# Patient Record
Sex: FEMALE | Race: WHITE | NOT HISPANIC OR LATINO | Employment: OTHER | URBAN - METROPOLITAN AREA
[De-identification: names, ages, dates, MRNs, and addresses within clinical notes are randomized per-mention and may not be internally consistent; named-entity substitution may affect disease eponyms.]

---

## 2017-01-23 ENCOUNTER — ALLSCRIPTS OFFICE VISIT (OUTPATIENT)
Dept: OTHER | Facility: OTHER | Age: 57
End: 2017-01-23

## 2017-01-23 DIAGNOSIS — N63.0 BREAST LUMP: ICD-10-CM

## 2017-01-26 ENCOUNTER — HOSPITAL ENCOUNTER (OUTPATIENT)
Dept: ULTRASOUND IMAGING | Facility: CLINIC | Age: 57
Discharge: HOME | End: 2017-01-26
Payer: COMMERCIAL

## 2017-01-26 ENCOUNTER — HOSPITAL ENCOUNTER (OUTPATIENT)
Dept: MAMMOGRAPHY | Facility: CLINIC | Age: 57
Discharge: HOME | End: 2017-01-26
Payer: COMMERCIAL

## 2017-01-26 DIAGNOSIS — N63.0 BREAST LUMP: ICD-10-CM

## 2017-01-26 PROCEDURE — G0206 DX MAMMO INCL CAD UNI: HCPCS

## 2017-01-26 PROCEDURE — 76642 ULTRASOUND BREAST LIMITED: CPT

## 2017-02-08 ENCOUNTER — ALLSCRIPTS OFFICE VISIT (OUTPATIENT)
Dept: OTHER | Facility: OTHER | Age: 57
End: 2017-02-08

## 2017-03-09 ENCOUNTER — TRANSCRIBE ORDERS (OUTPATIENT)
Dept: ADMINISTRATIVE | Facility: HOSPITAL | Age: 57
End: 2017-03-09

## 2017-03-09 DIAGNOSIS — Z12.31 OTHER SCREENING MAMMOGRAM: Primary | ICD-10-CM

## 2017-04-17 ENCOUNTER — GENERIC CONVERSION - ENCOUNTER (OUTPATIENT)
Dept: OTHER | Facility: OTHER | Age: 57
End: 2017-04-17

## 2017-05-11 ENCOUNTER — ALLSCRIPTS OFFICE VISIT (OUTPATIENT)
Dept: OTHER | Facility: OTHER | Age: 57
End: 2017-05-11

## 2017-05-11 DIAGNOSIS — Z79.899 OTHER LONG TERM (CURRENT) DRUG THERAPY: ICD-10-CM

## 2017-05-15 DIAGNOSIS — Z12.31 ENCOUNTER FOR SCREENING MAMMOGRAM FOR MALIGNANT NEOPLASM OF BREAST: ICD-10-CM

## 2017-05-30 ENCOUNTER — ALLSCRIPTS OFFICE VISIT (OUTPATIENT)
Dept: OTHER | Facility: OTHER | Age: 57
End: 2017-05-30

## 2017-06-09 ENCOUNTER — APPOINTMENT (OUTPATIENT)
Dept: LAB | Facility: CLINIC | Age: 57
End: 2017-06-09
Payer: COMMERCIAL

## 2017-06-09 DIAGNOSIS — Z79.899 OTHER LONG TERM (CURRENT) DRUG THERAPY: ICD-10-CM

## 2017-06-09 LAB
ALBUMIN SERPL BCP-MCNC: 3.7 G/DL (ref 3.5–5)
ALP SERPL-CCNC: 55 U/L (ref 46–116)
ALT SERPL W P-5'-P-CCNC: 31 U/L (ref 12–78)
ANION GAP SERPL CALCULATED.3IONS-SCNC: 6 MMOL/L (ref 4–13)
AST SERPL W P-5'-P-CCNC: 27 U/L (ref 5–45)
BASOPHILS # BLD AUTO: 0.06 THOUSANDS/ΜL (ref 0–0.1)
BASOPHILS NFR BLD AUTO: 1 % (ref 0–1)
BILIRUB SERPL-MCNC: 0.5 MG/DL (ref 0.2–1)
BILIRUB UR QL STRIP: NEGATIVE
BUN SERPL-MCNC: 18 MG/DL (ref 5–25)
CALCIUM SERPL-MCNC: 8.9 MG/DL (ref 8.3–10.1)
CHLORIDE SERPL-SCNC: 104 MMOL/L (ref 100–108)
CLARITY UR: CLEAR
CO2 SERPL-SCNC: 30 MMOL/L (ref 21–32)
COLOR UR: YELLOW
CREAT SERPL-MCNC: 0.92 MG/DL (ref 0.6–1.3)
EOSINOPHIL # BLD AUTO: 0.28 THOUSAND/ΜL (ref 0–0.61)
EOSINOPHIL NFR BLD AUTO: 4 % (ref 0–6)
ERYTHROCYTE [DISTWIDTH] IN BLOOD BY AUTOMATED COUNT: 14.3 % (ref 11.6–15.1)
GFR SERPL CREATININE-BSD FRML MDRD: >60 ML/MIN/1.73SQ M
GLUCOSE P FAST SERPL-MCNC: 110 MG/DL (ref 65–99)
GLUCOSE UR STRIP-MCNC: NEGATIVE MG/DL
HCT VFR BLD AUTO: 40.4 % (ref 34.8–46.1)
HGB BLD-MCNC: 13.7 G/DL (ref 11.5–15.4)
HGB UR QL STRIP.AUTO: NEGATIVE
KETONES UR STRIP-MCNC: NEGATIVE MG/DL
LEUKOCYTE ESTERASE UR QL STRIP: NEGATIVE
LYMPHOCYTES # BLD AUTO: 3.12 THOUSANDS/ΜL (ref 0.6–4.47)
LYMPHOCYTES NFR BLD AUTO: 43 % (ref 14–44)
MCH RBC QN AUTO: 30.8 PG (ref 26.8–34.3)
MCHC RBC AUTO-ENTMCNC: 33.9 G/DL (ref 31.4–37.4)
MCV RBC AUTO: 91 FL (ref 82–98)
MONOCYTES # BLD AUTO: 0.63 THOUSAND/ΜL (ref 0.17–1.22)
MONOCYTES NFR BLD AUTO: 9 % (ref 4–12)
NEUTROPHILS # BLD AUTO: 3.19 THOUSANDS/ΜL (ref 1.85–7.62)
NEUTS SEG NFR BLD AUTO: 43 % (ref 43–75)
NITRITE UR QL STRIP: NEGATIVE
NRBC BLD AUTO-RTO: 0 /100 WBCS
PH UR STRIP.AUTO: 6.5 [PH] (ref 4.5–8)
PLATELET # BLD AUTO: 299 THOUSANDS/UL (ref 149–390)
PMV BLD AUTO: 10.9 FL (ref 8.9–12.7)
POTASSIUM SERPL-SCNC: 3.9 MMOL/L (ref 3.5–5.3)
PROT SERPL-MCNC: 7.3 G/DL (ref 6.4–8.2)
PROT UR STRIP-MCNC: NEGATIVE MG/DL
RBC # BLD AUTO: 4.45 MILLION/UL (ref 3.81–5.12)
SODIUM SERPL-SCNC: 140 MMOL/L (ref 136–145)
SP GR UR STRIP.AUTO: 1.02 (ref 1–1.03)
TSH SERPL DL<=0.05 MIU/L-ACNC: 1.31 UIU/ML (ref 0.36–3.74)
UROBILINOGEN UR QL STRIP.AUTO: 0.2 E.U./DL
WBC # BLD AUTO: 7.29 THOUSAND/UL (ref 4.31–10.16)

## 2017-06-09 PROCEDURE — 84443 ASSAY THYROID STIM HORMONE: CPT

## 2017-06-09 PROCEDURE — 80053 COMPREHEN METABOLIC PANEL: CPT

## 2017-06-09 PROCEDURE — 81003 URINALYSIS AUTO W/O SCOPE: CPT

## 2017-06-09 PROCEDURE — 85025 COMPLETE CBC W/AUTO DIFF WBC: CPT

## 2017-06-09 PROCEDURE — 36415 COLL VENOUS BLD VENIPUNCTURE: CPT

## 2017-06-12 ENCOUNTER — GENERIC CONVERSION - ENCOUNTER (OUTPATIENT)
Dept: OTHER | Facility: OTHER | Age: 57
End: 2017-06-12

## 2017-07-06 ENCOUNTER — HOSPITAL ENCOUNTER (OUTPATIENT)
Dept: BONE DENSITY | Facility: IMAGING CENTER | Age: 57
Discharge: HOME | End: 2017-07-06
Payer: COMMERCIAL

## 2017-07-06 DIAGNOSIS — Z12.31 ENCOUNTER FOR SCREENING MAMMOGRAM FOR MALIGNANT NEOPLASM OF BREAST: ICD-10-CM

## 2017-07-06 PROCEDURE — G0202 SCR MAMMO BI INCL CAD: HCPCS

## 2017-09-08 ENCOUNTER — LAB CONVERSION - ENCOUNTER (OUTPATIENT)
Dept: OTHER | Facility: OTHER | Age: 57
End: 2017-09-08

## 2017-09-08 LAB
CHOLEST SERPL-MCNC: 260 MG/DL
CHOLEST/HDLC SERPL: 3 (CALC)
HBA1C MFR BLD HPLC: 5 % OF TOTAL HGB
HDLC SERPL-MCNC: 88 MG/DL
LDL CHOLESTEROL (HISTORICAL): 154 MG/DL (CALC)
NON-HDL-CHOL (CHOL-HDL) (HISTORICAL): 172 MG/DL (CALC)
TRIGL SERPL-MCNC: 78 MG/DL

## 2017-09-14 ENCOUNTER — ALLSCRIPTS OFFICE VISIT (OUTPATIENT)
Dept: OTHER | Facility: OTHER | Age: 57
End: 2017-09-14

## 2017-10-26 NOTE — PROGRESS NOTES
Assessment  1  Abnormal fasting glucose (790 29) (R73 01)   2  Hyperlipidemia (272 4) (E78 5)    Plan  PMH: History of influenza vaccination    · Fluzone Quadrivalent Intramuscular Suspension    #1 patient's cholesterol numbers are about the same or a little higher  I'm not to worry about them since her [place] risk factor for heart disease is so low  She will eat well and primarily will start exercising at least 20-30 minutes a day  Our goal is to lose about 9 pounds before she comes back in 6 months    #2 has elevated fasting sugar but her A1c does not show any signs of diabetes at all    Recheck for weight check in 6 months or sooner if needed    Next colonoscopy is July 2020  Next mammogram May 13, 2017 or later; she has an appointment for Maggie  Next GYN test is due December 2018 and follows with GYN      Chief Complaint  Pt presents here to review blood work;  due 07/2020done 01/2013due 07/06 2018due 12/2018      History of Present Illness  Patient is here to go over her cholesterol and A1c for an elevated fasting blood sugar  Patient started out doing well watching her diet until her father-in-law went to hospice and she is now sharing this duty of watching him and not leaving him alone with her brother and sister-in-law  she had been feeling well never really had a problem  is walking more especially since she started school again      Review of Systems  Constitutional  No fever chills no fatigue no weight loss no weight gain    Mental status  No anxiety or depression and no sleep disturbances no changes in personality or emotional problems no suicidal or homicidal ideations    Eyes  No eye pain no red eyes no visual disturbances no discharge no eye itch    ENT  No earache no hearing loss nasal discharge no sore throat no hoarseness no postnasal drip     Cardio  No chest pain no palpitations no leg edema no claudication no dyspnea on exertion no nocturnal dyspnea    Respiratory  No shortness of breath or wheeze no cough no orthopnea no dyspnea on exertion no hemoptysis no sputum production    GI  No abdominal pain no nausea no vomiting no diarrhea or constipation no bloody stools no change in bowel habits no change in weight      no dysuria hematuria no pyuria no incontinence no pelvic pain    Musculoskeletal  No myalgias arthralgias no joint swelling or stiffness no limb pain or swelling    Skin  No rashes or lesions no itchiness and no wounds    Neuro  No headache dizziness lightheadedness syncope numbness paresthesias or confusion    Heme  No swollen glands no easy bruising        Active Problems  1  Abnormal fasting glucose (790 29) (R73 01)   2  Allergic rhinitis (477 9) (J30 9)   3  Hyperlipidemia (272 4) (E78 5)   4  Long term use of drug (V58 69) (Z79 899)   5  Postmenopause atrophic vaginitis (627 3) (N95 2)    Past Medical History  1  History of Anemia (285 9) (D64 9)   2  History of Chest tightness or pressure (786 59) (R07 89)   3  History of DVT (deep vein thrombosis) in pregnancy (671 30) (O22 30,I82 409)   4  History of  2 (V22 2) (Z33 1)   5  History of actinic keratosis (V13 3) (Z87 2)   6  History of breast lump (V13 89) (Z87 898)   7  History of vaginitis (V13 29) (Z87 42)   8  History of Shoulder pain, left (719 41) (M25 512)    Surgical History  1  History of Biopsy Breast Percutaneous Needle Core   2  History of  Section   3  History of Complete Colonoscopy   4  History of Tonsillectomy    Family History  Mother    1  Family history of Anxiety   2  Family history of Breast cancer   3  Family history of    4  Family history of pancreatic cancer (V16 0) (Z80 0)  Father    5  Family history of    6  Family history of Hypertension   7  Family history of Prostate cancer  Daughter    6  Family history of Depression  Maternal Grandmother    9  Family history of Breast cancer  Paternal Grandmother    8  Family history of Breast cancer   11   Family history of Stroke  Maternal Grandfather    12  Family history of CAD (coronary artery disease)  Maternal Aunt    13  Family history of malignant neoplasm of breast (V16 3) (Z80 3)  Maternal Cousin    15  Family history of malignant neoplasm of breast (V16 3) (Z80 3)    Social History   · Activities: Treadmill   · Always uses seat belt   · Caffeine use (V49 89) (F15 90)   · Currently sexually active   · Drinks wine (V49 89) (Z78 9)   · Exercise: Running   · Exercise: Walking   · Exercises moderately less than 3 times a week   · Has smoke detectors   · Never a smoker   · Denied: History of No drug use   · Weight training    Current Meds   1  Aspirin 81 MG Oral Tablet Chewable; Take 1 tablet daily; Therapy: (Recorded:[id number]) to Recorded   2  Pedro/Mag Citrate TABS; Therapy: (Recorded:16Jun2015) to Recorded   3  Glucosamine Chondroitin TABS; take 2 tablet daily; Therapy: (Recorded:16Jun2015) to Recorded   4  Iron TABS; TAKE 1 TABLET DAILY AS DIRECTED; Therapy: (Recorded:02Dec2014) to Recorded   5  Fleetwood-3 Fish Oil CAPS; take 1 capsule daily; Therapy: (Recorded:02Dec2014) to Recorded   6  Womens One Daily TABS; TAKE 1 TABLET DAILY; Therapy: (Recorded:23Jan2017) to Recorded    Allergies  1  No Known Drug Allergies  2  Dust   3  Mold   4  Trees   5  No Known Food Allergies    Vitals  Vital Signs    Recorded: 14Sep2017 03:16PM   Heart Rate 60   Respiration 14   Systolic 958, RUE, Sitting   Diastolic 60, RUE, Sitting   Height 5 ft 7 in   Weight 178 lb 12 8 oz   BMI Calculated 28   BSA Calculated 1 93     Physical Exam  Mental status-  Awake alert oriented conversant cooperative relevant     Gen  No acute distress appears age        Results/Data  PHQ-2 Adult Depression Screening 14Sep2017 03:19PM User, [employer]     Test Name Result Flag Reference   PHQ-2 Adult Depression Score 0     Over the last two weeks, how often have you been bothered by any of the following problems?   Little interest or pleasure in doing things: Not at all - 0  Feeling down, depressed, or hopeless: Not at all - 0   PHQ-2 Adult Depression Screening Negative       [place] Risk of Heart Attack [id number] 03:39PM Thomas Stevens     Test Name Result Flag Reference   [place] - Comparative 12 %     Sex: Female  Age: 61  Total Cholesterol: 260 mg/dL  HDL Cholesterol: 88 mg/dL  Systolic Blood Pressure: 823 mmHg  Diastolic Blood Pressure: 60 mmHg  Diabetes: No  Smoking Status: No   [place] - Ten Year 3 %     Sex: Female  Age: 61  Total Cholesterol: 260 mg/dL  HDL Cholesterol: 88 mg/dL  Systolic Blood Pressure: 342 mmHg  Diastolic Blood Pressure: 60 mmHg  Diabetes: No  Smoking Status: No     (1) LIPID PANEL, FASTING [id number] 07:01AM Thomas Stevens     Test Name Result Flag Reference   CHOLESTEROL, TOTAL 260 mg/dL H <200   HDL CHOLESTEROL 88 mg/dL  >32   TRIGLICERIDES 78 mg/dL  <256   LDL-CHOLESTEROL 154 mg/dL (calc) H    Reference range: <100     Desirable range <100 mg/dL for patients with CHD or  diabetes and <70 mg/dL for diabetic patients with  known heart disease  The Festus-Gonzalez calculation is a validated novel   method that provides better accuracy than the   Friedewald equation in the estimation of LDL-C,   particularly when TG levels are 150-400 mg/dL and   LDL-C levels are lower than 70 mg/dL  Reference:  Natividad Alanis et al  Comparison of a Novel   Method vs the Friedewald Equation for Estimating   Low-Density Lipoprotein Cholesterol Levels From the   Standard Lipid Profile  ALEX  3298;823(32): 5220-4163  For additional information, please refer to  http://In[employer] API/faq/TQW488  (This link is being provided for informational/  educational purposes only )   CHOL/HDLC RATIO 3 0 (calc)  <5 0   NON HDL CHOLESTEROL 172 mg/dL (calc) H <130   For patients with diabetes plus 1 major ASCVD risk   factor, treating to a non-HDL-C goal of <100 mg/dL   (LDL-C of <70 mg/dL) is considered a therapeutic   option       (Q) HEMOGLOBIN A1c [id number] 07:01AM Phoebe Salazar   REPORT COMMENT:  FASTING:YES     Test Name Result Flag Reference   HEMOGLOBIN A1c 5 0 % of total Hgb  <5 7   For the purpose of screening for the presence of  diabetes:     <5 7%       Consistent with the absence of diabetes  5 7-6 4%    Consistent with increased risk for diabetes              (prediabetes)  > or =6 5%  Consistent with diabetes     This assay result is consistent with a decreased risk  of diabetes  Currently, no consensus exists regarding use of  hemoglobin A1c for diagnosis of diabetes in children  According to American Diabetes Association (ADA)  guidelines, hemoglobin A1c <7 0% represents optimal  control in non-pregnant diabetic patients  Different  metrics may apply to specific patient populations  Standards of Medical Care in Diabetes(ADA)  (1) LIPID PANEL, FASTING [id number] 08:39AM Phoebe Salazar     Test Name Result Flag Reference   CHOLESTEROL, TOTAL 234 mg/dL H 125-200   HDL CHOLESTEROL 74 mg/dL  > OR = 46   TRIGLICERIDES 73 mg/dL  <294   LDL-CHOLESTEROL 145 mg/dL (calc) H <130   Desirable range <100 mg/dL for patients with CHD or  diabetes and <70 mg/dL for diabetic patients with  known heart disease  CHOL/HDLC RATIO 3 2 (calc)  < OR = 5 0   NON HDL CHOLESTEROL 160 mg/dL (calc) H    Target for non-HDL cholesterol is 30 mg/dL higher than   LDL cholesterol target       Future Appointments    Date/Time Provider Specialty Site   03/19/2018 02:15 PM Phoebe Salazar DO Family Medicine [facility]   02/15/2018 04:00 PM Ila Sandoval, [facility] Obstetrics/Gynecology [facility]     Signatures   Electronically signed by : Steve Cason DO; Sep 14 2017  4:26PM EST                       (Author)

## 2018-01-13 VITALS
WEIGHT: 182 LBS | BODY MASS INDEX: 28.56 KG/M2 | DIASTOLIC BLOOD PRESSURE: 60 MMHG | HEIGHT: 67 IN | SYSTOLIC BLOOD PRESSURE: 102 MMHG

## 2018-01-13 VITALS
BODY MASS INDEX: 29 KG/M2 | SYSTOLIC BLOOD PRESSURE: 120 MMHG | RESPIRATION RATE: 14 BRPM | WEIGHT: 184.8 LBS | HEART RATE: 60 BPM | HEIGHT: 67 IN | DIASTOLIC BLOOD PRESSURE: 70 MMHG

## 2018-01-14 VITALS
HEIGHT: 67 IN | HEART RATE: 60 BPM | SYSTOLIC BLOOD PRESSURE: 114 MMHG | DIASTOLIC BLOOD PRESSURE: 60 MMHG | BODY MASS INDEX: 28.16 KG/M2 | WEIGHT: 179.4 LBS | RESPIRATION RATE: 16 BRPM

## 2018-01-14 VITALS
SYSTOLIC BLOOD PRESSURE: 110 MMHG | HEART RATE: 60 BPM | WEIGHT: 178.8 LBS | BODY MASS INDEX: 28.06 KG/M2 | HEIGHT: 67 IN | DIASTOLIC BLOOD PRESSURE: 60 MMHG | RESPIRATION RATE: 14 BRPM

## 2018-01-15 NOTE — MISCELLANEOUS
Message   Recorded as Task   Date: 04/14/2017 01:31 PM, Created By: Camille Leija   Task Name: Call Back   Assigned To: Heide Burgess   Regarding Patient: Avelino Luke, Status: Active   CommentErchloe Peres - 14 Apr 2017 1:31 PM     TASK CREATED  Caller: Self; [phone] (Home)  pt scheduled her yearly recheck for may  does she need labs and could she also have an order to be tested for a predisposition for pancreatic cancer since she does have a strong family history of pancreatic cancer deaths  pt # [phone]   [facility],DQAVW - 16 Apr 2017 8:20 PM     TASK REPLIED TO: Previously Assigned To Kiana Miguel  #1 there is no lab testing for pancreatic cancer other than possibly seeing a   Digna Rivas to what they may have to offer    #2 she is not on any medications so I really have no blood work I need to draw since everything she had done last year was totally normal     She has symptoms that I need to investigate I have no justification for blood work at this time  Camille Leija - 17 Apr 2017 10:38 AM     TASK EDITED  Bennett Taylor - 17 Apr 2017 10:44 AM     TASK EDITED  pt informed   Camille Leija - 17 Apr 2017 10:44 AM     TASK REASSIGNED: Previously Assigned To [employer]   Electronically signed by : Wes Brock DO;  Apr 17 2017  5:25PM EST                       (Author)

## 2018-01-16 NOTE — MISCELLANEOUS
Message   Recorded as Task   Date: 06/12/2017 08:28 AM, Created By: Savage Krause   Task Name: Call Back   Assigned To: Savage Krause   Regarding Patient: Jeffery Mcpherson, Status: Active   CommentTarri Ian - 12 Jun 2017 8:28 AM     TASK CREATED  Call patient please    Received her blood work and her sugar was elevated as well as her cholesterol  Regarding sugar please cut down on sweets bread and pasta  Regarding her cholesterol cut down on cheese ice cream and fatty cuts of meat  Please make an appointment for 3 months and have fasting blood work done with an A1c and lipids  Orders are on the [address]r Drive - 12 Jun 2017 11:09 AM     TASK REPLIED TO: Previously Assigned To Katlyn Figueroa  SPOKE TO PATIENT, SHE BOOKED FOR SEPT        Signatures   Electronically signed by : Dany Nissen, DO; Jun 12 2017  1:10PM EST                       (Author)

## 2018-02-15 ENCOUNTER — OFFICE VISIT (OUTPATIENT)
Dept: OBGYN CLINIC | Facility: CLINIC | Age: 58
End: 2018-02-15
Payer: COMMERCIAL

## 2018-02-15 VITALS
BODY MASS INDEX: 27.47 KG/M2 | SYSTOLIC BLOOD PRESSURE: 108 MMHG | HEIGHT: 67 IN | WEIGHT: 175 LBS | DIASTOLIC BLOOD PRESSURE: 64 MMHG

## 2018-02-15 DIAGNOSIS — Z11.51 SCREENING FOR HPV (HUMAN PAPILLOMAVIRUS): ICD-10-CM

## 2018-02-15 DIAGNOSIS — Z01.419 ENCOUNTER FOR GYNECOLOGICAL EXAMINATION (GENERAL) (ROUTINE) WITHOUT ABNORMAL FINDINGS: Primary | ICD-10-CM

## 2018-02-15 DIAGNOSIS — Z12.31 VISIT FOR SCREENING MAMMOGRAM: ICD-10-CM

## 2018-02-15 PROBLEM — E78.5 HYPERLIPIDEMIA: Status: ACTIVE | Noted: 2017-06-12

## 2018-02-15 PROCEDURE — 87624 HPV HI-RISK TYP POOLED RSLT: CPT | Performed by: PHYSICIAN ASSISTANT

## 2018-02-15 PROCEDURE — S0612 ANNUAL GYNECOLOGICAL EXAMINA: HCPCS | Performed by: PHYSICIAN ASSISTANT

## 2018-02-15 PROCEDURE — G0145 SCR C/V CYTO,THINLAYER,RESCR: HCPCS | Performed by: PHYSICIAN ASSISTANT

## 2018-02-15 RX ORDER — MULTIVIT WITH CALCIUM,IRON,MIN 27MG-0.4MG
1 TABLET ORAL DAILY
COMMUNITY

## 2018-02-15 RX ORDER — ASPIRIN 81 MG/1
1 TABLET, CHEWABLE ORAL DAILY
COMMUNITY

## 2018-02-15 RX ORDER — IRON,CARBONYL/ASCORBIC ACID 100-250 MG
1 TABLET ORAL DAILY
COMMUNITY

## 2018-02-15 RX ORDER — CHLORAL HYDRATE 500 MG
1 CAPSULE ORAL DAILY
COMMUNITY

## 2018-02-15 NOTE — ASSESSMENT & PLAN NOTE
Annual exam and pap performed, will call if abnormal or send letter if normal   Mammo rx given to patient  RTO 1 year

## 2018-02-15 NOTE — PROGRESS NOTES
Assessment/Plan  Problem List Items Addressed This Visit     Encounter for gynecological examination (general) (routine) without abnormal findings - Primary     Annual exam and pap performed, will call if abnormal or send letter if normal   Mammo rx given to patient  RTO 1 year  Relevant Orders    Liquid-based pap, screening      Other Visit Diagnoses     Screening for HPV (human papillomavirus)        Relevant Orders    Liquid-based pap, screening    Visit for screening mammogram        Relevant Orders    Mammo screening bilateral w cad        Subjective   Mike Fields is a 61 y o  female who presents for annual GYN exam  She reports changes in medical hx- dx with hyperlipidemia attempting diet modification before medication  She denies any changes in surgical or family histories  Patient had BRCA testing - negative  No menses, and she denies postmenopausal bleeding, spotting, or discharge  She reports that she is sexually active with 1 partner  She reports some pain or dryness with intercourse using coconut oil with good relief    Last Pap smear: 2014  Regular self breast exam: yes  Last mammogram: 2017  Last DEXA scan: 2013  Last Colonoscopy:  Family history of uterine or ovarian cancer: no  Family history of breast cancer: yes - mother, maternal aunt, maternal distant cousin  Family history of colon cancer: no    Menstrual History:  OB History      Para Term  AB Living    2              SAB TAB Ectopic Multiple Live Births                     Obstetric Comments    DVT in  Pregnancy          No LMP recorded (lmp unknown)   Patient is postmenopausal   Dysmenorrhea: None    The following portions of the patient's history were reviewed and updated as appropriate: allergies, current medications, past family history, past medical history, past social history, past surgical history and problem list     Review of Systems  Review of Systems   Constitutional: Negative for chills and fever  Respiratory: Negative for shortness of breath  Cardiovascular: Negative for chest pain  Gastrointestinal: Negative for abdominal pain  Genitourinary: Negative for dysuria, pelvic pain, vaginal discharge and vaginal pain  Negative for breast pain or lumps  (+) for mild stress urinary incontinence  Neurological: Negative for headaches  Objective   /64 (BP Location: Right arm, Patient Position: Sitting, Cuff Size: Standard)   Ht 5' 6 5" (1 689 m)   Wt 79 4 kg (175 lb)   LMP  (LMP Unknown)   Breastfeeding? No   BMI 27 82 kg/m²     Physical Exam   Constitutional: She is oriented to person, place, and time  She appears well-developed and well-nourished  Neck: No thyromegaly present  Cardiovascular: Normal rate and regular rhythm  Pulmonary/Chest: Effort normal and breath sounds normal  Right breast exhibits no mass, no nipple discharge, no skin change and no tenderness  Left breast exhibits no mass, no nipple discharge, no skin change and no tenderness  Abdominal: Soft  There is no tenderness  There is no rebound and no guarding  Genitourinary: There is no rash or lesion on the right labia  There is no rash or lesion on the left labia  Uterus is not enlarged and not tender  Cervix exhibits no motion tenderness and no discharge  Right adnexum displays no mass and no tenderness  Left adnexum displays no mass and no tenderness  No bleeding in the vagina  No vaginal discharge found  Genitourinary Comments: Pap performed   Neurological: She is alert and oriented to person, place, and time  Psychiatric: She has a normal mood and affect  Nursing note and vitals reviewed

## 2018-02-19 LAB
HPV RRNA GENITAL QL NAA+PROBE: NORMAL
LAB AP GYN PRIMARY INTERPRETATION: NORMAL
Lab: NORMAL

## 2018-03-12 ENCOUNTER — TELEPHONE (OUTPATIENT)
Dept: FAMILY MEDICINE CLINIC | Facility: CLINIC | Age: 58
End: 2018-03-12

## 2018-03-12 DIAGNOSIS — E78.2 MIXED HYPERLIPIDEMIA: Primary | ICD-10-CM

## 2018-03-12 NOTE — TELEPHONE ENCOUNTER
Alisson Baker stopped by to  a script for her 6 month recheck  She thought you'd want her cholesterol levels checked  She uses [facility] as her lab      Best number for Alisson Baker:  [phone]

## 2018-03-12 NOTE — TELEPHONE ENCOUNTER
Sorry, I misunderstood  I thought she had her yearly blood work ordered and I forgot to order the cholesterol  I read her old note and see that she is not due for any blood work she is only due for a weight check  Because her risk factor  For heart disease is so low, I was not going to recheck the cholesterol, however if she would like to do it she certainly can and she can use the order on the printer        This is only need at this point nothing else

## 2018-03-18 NOTE — PROGRESS NOTES
ASSESSMENT/PLAN:  Over weight  Patient now is perfect  She is going to continue to watch her weight and not gain it back  She is going to continue her exercising as she has done awesome job  Her goal was 9 lb and she lost 8! No one ever seems to get to their goal but she did  History of elevated fasting blood sugar  No history of diabetes A1c is 5 0    In 6 months we will check patient back again  She will have all of her fasting blood work done prior by about 1 week  If she needs anything prior to that she will see us as needed     Next colonoscopy is 2020  Next mammogram 2018 we will give her an order  Next Pap test 2020 follows with gyn     Problem List as of 3/19/2018 Reviewed: 2/15/2018  4:48 PM by Alton Montesinos PA-C    Encounter for gynecological examination (general) (routine) without abnormal findings    Last Assessment & Plan 2/15/2018 Office Visit Written 2/15/2018  4:48 PM by Alton Montesinos PA-C     Annual exam and pap performed, will call if abnormal or send letter if normal   Mammo rx given to patient  RTO 1 year  Hyperlipidemia    Postmenopause atrophic vaginitis            Subjective:   Chief Complaint   Patient presents with    Follow-up     Patient is here for recheck on her blood pressure and also of her weight  She has done a marble its job and has lost 8 lb over the last 6 months! She feels good with no concerns and no problems  patient ID: Lorenza Alonso is a 61 y o  female      Past Medical History:   Diagnosis Date    Actinic keratosis     last assessed - [id number]    Anemia     Breast lump     last assessed - [id number]    Chest tightness or pressure     Resolved - [id number]    DVT (deep vein thrombosis) in pregnancy [facility])     Shoulder pain, left     last assessed - [id number]     Past Surgical History:   Procedure Laterality Date    BREAST BIOPSY       SECTION      x 2    COLONOSCOPY      Complete Colonoscopy    TONSILLECTOMY Family History   Problem Relation Age of Onset    Anxiety disorder Mother     Breast cancer Mother     Pancreatic cancer Mother    [facility] Parkinsonism Father     Hypertension Father     Prostate cancer Father     Breast cancer Maternal Grandmother     Coronary artery disease Maternal Grandfather     Breast cancer Paternal Grandmother     Stroke Paternal Grandmother    [facility] Depression Daughter     Breast cancer Maternal Aunt     Breast cancer Cousin      Social History   Substance Use Topics    Smoking status: Never Smoker    Smokeless tobacco: Never Used    Alcohol use Yes      Comment: Drinks wine     History   Smoking Status    Never Smoker   Smokeless Tobacco    Never Used        MED LIST WAS REVIEWED AND UPDATED       ROS    Constitutional  No fever chills no fatigue no weight loss no weight gain    Mental status  No anxiety or depression and no sleep disturbances no changes in personality or emotional problems no suicidal or homicidal ideations    Eyes  No eye pain no red eyes no visual disturbances no discharge no eye itch    ENT  No earache no hearing loss nasal discharge no sore throat no hoarseness no postnasal drip     Cardio  No chest pain no palpitations no leg edema no claudication no dyspnea on exertion no nocturnal dyspnea    Respiratory  No shortness of breath or wheeze no cough no orthopnea no dyspnea on exertion no hemoptysis no sputum production    GI  No abdominal pain no nausea no vomiting no diarrhea or constipation no bloody stools no change in bowel habits no change in weight      no dysuria hematuria no pyuria no incontinence no pelvic pain    Musculoskeletal  No myalgias arthralgias no joint swelling or stiffness no limb pain or swelling    Skin  No rashes or lesions no itchiness and no wounds    Neuro  No headache dizziness lightheadedness syncope numbness paresthesias or confusion    Heme  No swollen glands no easy bruising          Objective:    Radiology (if applicable):  No orders to display        VITALS:  Wt Readings from Last 3 Encounters:   03/19/18 77 1 kg (170 lb)   02/15/18 79 4 kg (175 lb)   09/14/17 81 1 kg (178 lb 12 8 oz)     BP Readings from Last 3 Encounters:   03/19/18 110/68   02/15/18 108/64   09/14/17 110/60     Pulse Readings from Last 3 Encounters:   03/19/18 56   09/14/17 60   05/11/17 60     Body mass index is 26 63 kg/m²      Laboratory Results:   Lab Results   Component Value Date    WBC 7 29 06/09/2017    HGB 13 7 06/09/2017    HCT 40 4 06/09/2017    MCV 91 06/09/2017     06/09/2017     Lab Results   Component Value Date     06/09/2017    K 3 9 06/09/2017     06/09/2017    CO2 30 06/09/2017    BUN 18 06/09/2017     Lab Results   Component Value Date    ALT 31 06/09/2017    AST 27 06/09/2017    ALKPHOS 55 06/09/2017    EGFR >60 0 06/09/2017    CALCIUM 8 9 06/09/2017     No results found for: TSH    Lipid Profile:   Lab Results   Component Value Date    CHOL 260 (H) 09/07/2017     Lab Results   Component Value Date    HDL 88 09/07/2017     No results found for: [facility]  Lab Results   Component Value Date    TRIG 78 09/07/2017       Diabetic labs (if applicable)  Lab Results   Component Value Date    HGBA1C 5 0 09/07/2017     Lab Results   Component Value Date    GLUF 110 (H) 06/09/2017    CREATININE 0 92 06/09/2017        Physical Exam    Constitutional  Appears healthy, Looks well, Appearance consistent with age    Mental Status  Alert, Oriented, Cooperative, Memory function normal , clean, and reasonable    Neck  No neck mass, No thyromegaly, Good carotid upstrokes bilaterally, trachea midline positive click    Respiratory  Breath sounds normal, No rales, No rhonchi, No wheezing, normal palpation    Cardiac   Regular rhythm without ectopy or murmur no S3-S4, no heave lift or thrill to palpation    Vascular  No leg edema, No pedal edema    Muscular skeletal  No clubbing cyanosis , muscle tone normal    Skin  No appreciable rashes or abnormal appearing lesions

## 2018-03-19 ENCOUNTER — OFFICE VISIT (OUTPATIENT)
Dept: FAMILY MEDICINE CLINIC | Facility: CLINIC | Age: 58
End: 2018-03-19
Payer: COMMERCIAL

## 2018-03-19 VITALS
WEIGHT: 170 LBS | SYSTOLIC BLOOD PRESSURE: 110 MMHG | BODY MASS INDEX: 26.68 KG/M2 | HEART RATE: 56 BPM | DIASTOLIC BLOOD PRESSURE: 68 MMHG | RESPIRATION RATE: 14 BRPM | HEIGHT: 67 IN

## 2018-03-19 DIAGNOSIS — Z79.899 LONG-TERM USE OF HIGH-RISK MEDICATION: ICD-10-CM

## 2018-03-19 DIAGNOSIS — E78.2 MIXED HYPERLIPIDEMIA: Primary | ICD-10-CM

## 2018-03-19 DIAGNOSIS — E55.9 VITAMIN D DEFICIENCY: ICD-10-CM

## 2018-03-19 PROCEDURE — 99213 OFFICE O/P EST LOW 20 MIN: CPT | Performed by: FAMILY MEDICINE

## 2018-03-19 NOTE — PATIENT INSTRUCTIONS
Over weight  Patient now is perfect  She is going to continue to watch her weight and not gain it back  She is going to continue her exercising as she has done awesome job  Her goal was 9 lb and she lost 8! No one ever seems to get to their goal but she did      History of elevated fasting blood sugar  No history of diabetes A1c is 5 0    In 6 months we will check patient back again  She will have all of her fasting blood work done prior by about 1 week  If she needs anything prior to that she will see us as needed     Next colonoscopy is July 2020  Next mammogram 7/6/2018 we will give her an order  Next Pap test February 2020 follows with gyn

## 2018-08-14 ENCOUNTER — HOSPITAL ENCOUNTER (OUTPATIENT)
Dept: BONE DENSITY | Facility: IMAGING CENTER | Age: 58
Discharge: HOME | End: 2018-08-14
Payer: COMMERCIAL

## 2018-08-14 DIAGNOSIS — Z12.31 VISIT FOR SCREENING MAMMOGRAM: ICD-10-CM

## 2018-08-14 PROCEDURE — 77067 SCR MAMMO BI INCL CAD: CPT

## 2018-09-20 LAB
25(OH)D3 SERPL-MCNC: 62 NG/ML (ref 30–100)
ALBUMIN SERPL-MCNC: 4.2 G/DL (ref 3.6–5.1)
ALBUMIN/GLOB SERPL: 1.4 (CALC) (ref 1–2.5)
ALP SERPL-CCNC: 48 U/L (ref 33–130)
ALT SERPL-CCNC: 20 U/L (ref 6–29)
AST SERPL-CCNC: 25 U/L (ref 10–35)
BASOPHILS # BLD AUTO: 70 CELLS/UL (ref 0–200)
BASOPHILS NFR BLD AUTO: 1.2 %
BILIRUB SERPL-MCNC: 0.6 MG/DL (ref 0.2–1.2)
BUN SERPL-MCNC: 13 MG/DL (ref 7–25)
BUN/CREAT SERPL: NORMAL (CALC) (ref 6–22)
CALCIUM SERPL-MCNC: 9.3 MG/DL (ref 8.6–10.4)
CHLORIDE SERPL-SCNC: 99 MMOL/L (ref 98–110)
CHOLEST SERPL-MCNC: 228 MG/DL
CHOLEST/HDLC SERPL: 2.8 (CALC)
CO2 SERPL-SCNC: 27 MMOL/L (ref 20–32)
CREAT SERPL-MCNC: 0.95 MG/DL (ref 0.5–1.05)
EOSINOPHIL # BLD AUTO: 162 CELLS/UL (ref 15–500)
EOSINOPHIL NFR BLD AUTO: 2.8 %
ERYTHROCYTE [DISTWIDTH] IN BLOOD BY AUTOMATED COUNT: 13.4 % (ref 11–15)
GLOBULIN SER CALC-MCNC: 3 G/DL (CALC) (ref 1.9–3.7)
GLUCOSE SERPL-MCNC: 87 MG/DL (ref 65–99)
HCT VFR BLD AUTO: 42.1 % (ref 35–45)
HDLC SERPL-MCNC: 81 MG/DL
HGB BLD-MCNC: 14.1 G/DL (ref 11.7–15.5)
LDLC SERPL CALC-MCNC: 130 MG/DL (CALC)
LYMPHOCYTES # BLD AUTO: 2981 CELLS/UL (ref 850–3900)
LYMPHOCYTES NFR BLD AUTO: 51.4 %
MCH RBC QN AUTO: 30.3 PG (ref 27–33)
MCHC RBC AUTO-ENTMCNC: 33.5 G/DL (ref 32–36)
MCV RBC AUTO: 90.5 FL (ref 80–100)
MONOCYTES # BLD AUTO: 481 CELLS/UL (ref 200–950)
MONOCYTES NFR BLD AUTO: 8.3 %
NEUTROPHILS # BLD AUTO: 2105 CELLS/UL (ref 1500–7800)
NEUTROPHILS NFR BLD AUTO: 36.3 %
NONHDLC SERPL-MCNC: 147 MG/DL (CALC)
PLATELET # BLD AUTO: 290 THOUSAND/UL (ref 140–400)
PMV BLD REES-ECKER: 10.5 FL (ref 7.5–12.5)
POTASSIUM SERPL-SCNC: 4.3 MMOL/L (ref 3.5–5.3)
PROT SERPL-MCNC: 7.2 G/DL (ref 6.1–8.1)
RBC # BLD AUTO: 4.65 MILLION/UL (ref 3.8–5.1)
SL AMB EGFR AFRICAN AMERICAN: 76 ML/MIN/1.73M2
SL AMB EGFR NON AFRICAN AMERICAN: 66 ML/MIN/1.73M2
SODIUM SERPL-SCNC: 137 MMOL/L (ref 135–146)
TRIGL SERPL-MCNC: 76 MG/DL
TSH SERPL-ACNC: 2.18 MIU/L (ref 0.4–4.5)
WBC # BLD AUTO: 5.8 THOUSAND/UL (ref 3.8–10.8)

## 2018-09-25 ENCOUNTER — OFFICE VISIT (OUTPATIENT)
Dept: FAMILY MEDICINE CLINIC | Facility: CLINIC | Age: 58
End: 2018-09-25
Payer: COMMERCIAL

## 2018-09-25 VITALS
SYSTOLIC BLOOD PRESSURE: 106 MMHG | DIASTOLIC BLOOD PRESSURE: 68 MMHG | BODY MASS INDEX: 26.53 KG/M2 | HEIGHT: 67 IN | HEART RATE: 64 BPM | WEIGHT: 169 LBS | RESPIRATION RATE: 12 BRPM

## 2018-09-25 DIAGNOSIS — E78.2 MIXED HYPERLIPIDEMIA: Primary | ICD-10-CM

## 2018-09-25 DIAGNOSIS — L57.0 ACTINIC KERATOSIS: ICD-10-CM

## 2018-09-25 DIAGNOSIS — Z00.00 WELL ADULT EXAM: ICD-10-CM

## 2018-09-25 PROBLEM — Z01.419 ENCOUNTER FOR GYNECOLOGICAL EXAMINATION (GENERAL) (ROUTINE) WITHOUT ABNORMAL FINDINGS: Status: RESOLVED | Noted: 2018-02-15 | Resolved: 2018-09-25

## 2018-09-25 PROCEDURE — 17000 DESTRUCT PREMALG LESION: CPT | Performed by: FAMILY MEDICINE

## 2018-09-25 PROCEDURE — 99396 PREV VISIT EST AGE 40-64: CPT | Performed by: FAMILY MEDICINE

## 2018-09-25 NOTE — PATIENT INSTRUCTIONS
Well adult exam  Patient's tetanus is up-to-date  Next mammogram will be August 2019  Her next Pap test will be 2020  Next colonoscopy is 2020    She will continue to eat well and exercise  She [facility] this [facility]    PROCEDURE:  Patient had her nose lesion cryo'd until there was a 1 mm free zone only times once  She knows that if a blister form she will leave it alone  It should flaky away and hopefully be totally smooth    If she can feel any rough spots rather than see them she can come back so I could touch them up and be done with it all at once    Lipids  Continue diet and exercise as above    Recheck 1 year sooner if needed  At that time we will order fasting blood work  Continue follow with gyn for Pap smear most

## 2018-09-25 NOTE — PROGRESS NOTES
ASSESSMENT/PLAN:    Well adult exam  Patient's tetanus is up-to-date  Next mammogram will be August 2019  Her next Pap test will be 2020  Next colonoscopy is 2020    She will continue to eat well and exercise  She [facility] her flu [facility] this month    PROCEDURE:  Patient had her nose lesion cryo'd until there was a 1 mm free zone only times once  She knows that if a blister form she will leave it alone  It should flaky away and hopefully be totally smooth  If she can feel any rough spots rather than see them she can come back so I could touch them up and be done with it all at once    Lipids  Continue diet and exercise as above    Recheck 1 year sooner if needed  At that time we will order fasting blood work  Continue follow with gyn for Pap smear most             Health Maintenance   Topic Date Due    Pneumococcal PPSV23 Highest Risk Adult (1 of 3 - PCV13) 05/05/1978    INFLUENZA VACCINE  09/01/2018    Depression Screening PHQ  09/14/2018    MAMMOGRAM  08/14/2019    PAP SMEAR  02/15/2020    CRC Screening: Colonoscopy  07/19/2020    DTaP,Tdap,and Td Vaccines (4 - Td) 10/01/2022         Problem List as of 9/25/2018 Reviewed: 9/25/2018  3:59 PM by Minoo Mejia, DO    Encounter for gynecological examination (general) (routine) without abnormal findings    Last Assessment & Plan 2/15/2018 Office Visit Written 2/15/2018  4:48 PM by Daniel Kerr PA-C     Annual exam and pap performed, will call if abnormal or send letter if normal   Mammo rx given to patient  RTO 1 year  Hyperlipidemia    Postmenopause atrophic vaginitis            Subjective:   Chief Complaint   Patient presents with    Hyperlipidemia     Patient is here for the recheck of her blood work  She is also here for physical  She got all her blood work done as well  She has been doing excellently maintaining her weight and exercising and eating well    She feels well without any problems    As a result she would like to see how she is doing on her cholesterol since that is primarily all diet and exercise  Patient also had actinic keratosis removed from her nose last time would like me to look at it again  It appears a little bit has come back        patient ID: Barby Rehman is a 60 y o  female      Past Medical History:   Diagnosis Date    Actinic keratosis     last assessed - [id number]    Anemia     Breast lump     last assessed - [id number]    Chest tightness or pressure     Resolved - [id number]    DVT (deep vein thrombosis) in pregnancy [facility])     Shoulder pain, left     last assessed - [id number]     Past Surgical History:   Procedure Laterality Date    BREAST BIOPSY       SECTION      x 2    COLONOSCOPY      Complete Colonoscopy    TONSILLECTOMY       Family History   Problem Relation Age of Onset    Anxiety disorder Mother     Breast cancer Mother     Pancreatic cancer Mother    [employer] Parkinsonism Father     Hypertension Father     Prostate cancer Father     Breast cancer Maternal Grandmother     Coronary artery disease Maternal Grandfather     Breast cancer Paternal Grandmother     Stroke Paternal Grandmother     Depression Daughter     Breast cancer Maternal Aunt     Breast cancer Cousin      Social History   Substance Use Topics    Smoking status: Never Smoker    Smokeless tobacco: Never Used    Alcohol use Yes      Comment: Drinks wine     History   Smoking Status    Never Smoker   Smokeless Tobacco    Never Used        MED LIST WAS REVIEWED AND UPDATED       ROS    Constitutional  No fever chills no fatigue no weight loss no weight gain    Mental status  No anxiety or depression and no sleep disturbances no changes in personality or emotional problems no suicidal or homicidal ideations    Eyes  No eye pain no red eyes no visual disturbances no discharge no eye itch    ENT  No earache no hearing loss nasal discharge no sore throat no hoarseness no postnasal drip     Cardio  No chest pain no palpitations no leg edema no claudication no dyspnea on exertion no nocturnal dyspnea    Respiratory  No shortness of breath or wheeze no cough no orthopnea no dyspnea on exertion no hemoptysis no sputum production    GI  No abdominal pain no nausea no vomiting no diarrhea or constipation no bloody stools no change in bowel habits no change in weight      no dysuria hematuria no pyuria no incontinence no pelvic pain    Musculoskeletal  No myalgias arthralgias no joint swelling or stiffness no limb pain or swelling    Skin  Nose lesion as above    Neuro  No headache dizziness lightheadedness syncope numbness paresthesias or confusion    Heme  No swollen glands no easy bruising          Objective:  Blood work reviewed with patient from 2018  Everything spot on normal  Cholesterol came down by 60 points and her HDL is excellent at 81 her   Nothing to do to change these numbers at this time but for patient to continue the same    VITALS:  Wt Readings from Last 3 Encounters:   09/25/18 76 7 kg (169 lb)   03/19/18 77 1 kg (170 lb)   02/15/18 79 4 kg (175 lb)     BP Readings from Last 3 Encounters:   09/25/18 106/68   03/19/18 110/68   02/15/18 108/64     Pulse Readings from Last 3 Encounters:   09/25/18 64   03/19/18 56   09/14/17 60     Body mass index is 26 47 kg/m²      Laboratory Results:   Lab Results   Component Value Date    WBC 5 8 09/19/2018    WBC 7 29 06/09/2017    HGB 14 1 09/19/2018    HGB 13 7 06/09/2017    HCT 42 1 09/19/2018    HCT 40 4 06/09/2017    MCV 90 5 09/19/2018    MCV 91 06/09/2017     09/19/2018     06/09/2017     Lab Results   Component Value Date     06/09/2017    K 3 9 06/09/2017    CL 99 09/19/2018     06/09/2017    CO2 27 09/19/2018    CO2 30 06/09/2017    BUN 13 09/19/2018    BUN 18 06/09/2017     Lab Results   Component Value Date    ALT 31 06/09/2017    AST 27 06/09/2017    ALKPHOS 48 09/19/2018    EGFR >60 0 06/09/2017    CALCIUM 9 3 09/19/2018     No results found for: TSH    Lipid Profile:   Lab Results   Component Value Date    CHOL 260 (H) 09/07/2017     Lab Results   Component Value Date    HDL 81 09/19/2018    HDL 88 09/07/2017     No results found for: [facility]  Lab Results   Component Value Date    TRIG 76 09/19/2018    TRIG 78 09/07/2017       Diabetic labs (if applicable)  Lab Results   Component Value Date    HGBA1C 5 0 09/07/2017     Lab Results   Component Value Date    GLUF 110 (H) 06/09/2017    CREATININE 0 95 09/19/2018          Physical Exam    Gen  No acute distress well-appearing well-nourished appears stated age    Mental status  Good judgment and insight oriented to time person and place, recent and remote memory intact mood and affect normal cooperative and patient is reasonable    HEENT  PERRLA 3 mm, EOMI without nystagmus, TMs clear, turbinates open pink no exudate, pharynx benign, tongue midline    Neck   supple no masses trachea midline positive click normal carotid upstrokes with no bruits    Cor  Regular rhythm without ectopy or murmur, no S3-S4, normal palpation that is no heave lift or thrill    Vascular  No edema, good pedal pulses    Lungs  CTA bilaterally in no respiratory distress no wheezes rhonchi or rales, normal to palpation no tactile fremitus    Abdomen  Soft, no palpable masses, no hepatosplenomegaly, normal bowel sounds, nontender    Lymphatics  No palpable nodes in the neck, supraclavicular area, axilla, or groin     Musculoskeletal  No clubbing cyanosis or edema muscle tone normal    Skin  Tip of her nose is a linear hyperkeratotic lesion about 3 or 4 mm long 1 or 2 mm wide almost in the shape of a finger[place]    Neuro  Normal ambulation, cranial nerves 2-12 grossly intact, higher functioning with reasoning intact

## 2019-02-28 ENCOUNTER — ANNUAL EXAM (OUTPATIENT)
Dept: OBGYN CLINIC | Facility: CLINIC | Age: 59
End: 2019-02-28
Payer: COMMERCIAL

## 2019-02-28 VITALS — DIASTOLIC BLOOD PRESSURE: 78 MMHG | SYSTOLIC BLOOD PRESSURE: 118 MMHG | WEIGHT: 173.8 LBS | BODY MASS INDEX: 27.22 KG/M2

## 2019-02-28 DIAGNOSIS — Z01.419 ENCOUNTER FOR GYNECOLOGICAL EXAMINATION (GENERAL) (ROUTINE) WITHOUT ABNORMAL FINDINGS: Primary | ICD-10-CM

## 2019-02-28 DIAGNOSIS — Z12.39 SCREENING FOR MALIGNANT NEOPLASM OF BREAST: ICD-10-CM

## 2019-02-28 PROCEDURE — S0612 ANNUAL GYNECOLOGICAL EXAMINA: HCPCS | Performed by: PHYSICIAN ASSISTANT

## 2019-02-28 NOTE — PROGRESS NOTES
Assessment/Plan  Problem List Items Addressed This Visit        Other    Encounter for gynecological examination (general) (routine) without abnormal findings - Primary     Annual exam performed  mammo rx given  RTO 1 year           Other Visit Diagnoses     Screening for malignant neoplasm of breast        Relevant Orders    Mammo screening bilateral w cad          Subjective   Lorenza Alonso is a 60 y o  female who presents for annual GYN exam  She denies any changes in Medical, Surgical or Family histories  No menses, and she denies postmenopausal bleeding, spotting, or discharge  She reports that she is sexually active with 1 partner  She reports pain using coconut oil with some relief  Last Pap smear: 2018  Regular self breast exam: yes  Last mammogram: 2018  Last Colonoscopy:   Family history of uterine or ovarian cancer: no  Family history of breast cancer: yes - mother, maternal aunt and maternal cousin  Family history of colon cancer: no    Menstrual History:  OB History        2    Para   2    Term                AB        Living           SAB        TAB        Ectopic        Multiple        Live Births               Obstetric Comments   DVT in  Pregnancy             No LMP recorded (lmp unknown)  Patient is postmenopausal        The following portions of the patient's history were reviewed and updated as appropriate: allergies, current medications, past family history, past medical history, past social history, past surgical history and problem list     Review of Systems  Review of Systems   Constitutional: Negative for chills and fever  Respiratory: Negative for shortness of breath  Cardiovascular: Negative for chest pain  Gastrointestinal: Negative for abdominal pain  Genitourinary: Negative for dysuria, pelvic pain, vaginal bleeding, vaginal discharge and vaginal pain  Negative for breast pain or lumps  (+) mild stress urinary incontinence  Neurological: Negative for headaches  Objective   /78 (BP Location: Left arm)   Wt 78 8 kg (173 lb 12 8 oz)   LMP  (LMP Unknown)   BMI 27 22 kg/m²     Physical Exam   Constitutional: She is oriented to person, place, and time  She appears well-developed and well-nourished  Neck: No thyromegaly present  Cardiovascular: Normal rate and regular rhythm  Pulmonary/Chest: Effort normal and breath sounds normal  Right breast exhibits no mass, no nipple discharge, no skin change and no tenderness  Left breast exhibits no mass, no nipple discharge, no skin change and no tenderness  Abdominal: Soft  There is no tenderness  There is no rebound and no guarding  Genitourinary: There is no rash or lesion on the right labia  There is no rash or lesion on the left labia  Uterus is not enlarged and not tender  Cervix exhibits no motion tenderness and no discharge  Right adnexum displays no mass and no tenderness  Left adnexum displays no mass and no tenderness  No bleeding in the vagina  No vaginal discharge found  Neurological: She is alert and oriented to person, place, and time  Psychiatric: She has a normal mood and affect  Nursing note and vitals reviewed

## 2019-08-27 ENCOUNTER — HOSPITAL ENCOUNTER (OUTPATIENT)
Dept: BONE DENSITY | Facility: IMAGING CENTER | Age: 59
Discharge: HOME | End: 2019-08-27
Payer: COMMERCIAL

## 2019-08-27 VITALS — WEIGHT: 173 LBS | BODY MASS INDEX: 27.15 KG/M2 | HEIGHT: 67 IN

## 2019-08-27 DIAGNOSIS — Z12.39 SCREENING FOR MALIGNANT NEOPLASM OF BREAST: ICD-10-CM

## 2019-08-27 PROCEDURE — 77067 SCR MAMMO BI INCL CAD: CPT

## 2019-08-27 PROCEDURE — 77063 BREAST TOMOSYNTHESIS BI: CPT

## 2019-09-24 ENCOUNTER — TELEPHONE (OUTPATIENT)
Dept: FAMILY MEDICINE CLINIC | Facility: CLINIC | Age: 59
End: 2019-09-24

## 2019-09-24 DIAGNOSIS — E78.2 MIXED HYPERLIPIDEMIA: Primary | ICD-10-CM

## 2019-09-24 DIAGNOSIS — Z79.899 ENCOUNTER FOR LONG-TERM (CURRENT) USE OF MEDICATIONS: ICD-10-CM

## 2019-09-24 DIAGNOSIS — E55.9 VITAMIN D DEFICIENCY: ICD-10-CM

## 2019-11-01 LAB
25(OH)D3 SERPL-MCNC: 65 NG/ML (ref 30–100)
ALBUMIN SERPL-MCNC: 4.2 G/DL (ref 3.6–5.1)
ALBUMIN/GLOB SERPL: 1.4 (CALC) (ref 1–2.5)
ALP SERPL-CCNC: 45 U/L (ref 33–130)
ALT SERPL-CCNC: 18 U/L (ref 6–29)
APPEARANCE UR: CLEAR
AST SERPL-CCNC: 22 U/L (ref 10–35)
BACTERIA UR QL AUTO: ABNORMAL /HPF
BASOPHILS # BLD AUTO: 70 CELLS/UL (ref 0–200)
BASOPHILS NFR BLD AUTO: 1.2 %
BILIRUB SERPL-MCNC: 0.8 MG/DL (ref 0.2–1.2)
BILIRUB UR QL STRIP: NEGATIVE
BUN SERPL-MCNC: 17 MG/DL (ref 7–25)
BUN/CREAT SERPL: NORMAL (CALC) (ref 6–22)
CALCIUM SERPL-MCNC: 9.4 MG/DL (ref 8.6–10.4)
CHLORIDE SERPL-SCNC: 99 MMOL/L (ref 98–110)
CHOLEST SERPL-MCNC: 245 MG/DL
CHOLEST/HDLC SERPL: 3 (CALC)
CO2 SERPL-SCNC: 30 MMOL/L (ref 20–32)
COLOR UR: YELLOW
CREAT SERPL-MCNC: 0.92 MG/DL (ref 0.5–0.99)
EOSINOPHIL # BLD AUTO: 209 CELLS/UL (ref 15–500)
EOSINOPHIL NFR BLD AUTO: 3.6 %
ERYTHROCYTE [DISTWIDTH] IN BLOOD BY AUTOMATED COUNT: 13.8 % (ref 11–15)
GLOBULIN SER CALC-MCNC: 3.1 G/DL (CALC) (ref 1.9–3.7)
GLUCOSE SERPL-MCNC: 78 MG/DL (ref 65–99)
GLUCOSE UR QL STRIP: NEGATIVE
HCT VFR BLD AUTO: 42.1 % (ref 35–45)
HDLC SERPL-MCNC: 83 MG/DL
HGB BLD-MCNC: 14.2 G/DL (ref 11.7–15.5)
HGB UR QL STRIP: NEGATIVE
HYALINE CASTS #/AREA URNS LPF: ABNORMAL /LPF
KETONES UR QL STRIP: NEGATIVE
LDLC SERPL CALC-MCNC: 145 MG/DL (CALC)
LEUKOCYTE ESTERASE UR QL STRIP: ABNORMAL
LYMPHOCYTES # BLD AUTO: 2807 CELLS/UL (ref 850–3900)
LYMPHOCYTES NFR BLD AUTO: 48.4 %
MCH RBC QN AUTO: 30.7 PG (ref 27–33)
MCHC RBC AUTO-ENTMCNC: 33.7 G/DL (ref 32–36)
MCV RBC AUTO: 91.1 FL (ref 80–100)
MONOCYTES # BLD AUTO: 487 CELLS/UL (ref 200–950)
MONOCYTES NFR BLD AUTO: 8.4 %
NEUTROPHILS # BLD AUTO: 2227 CELLS/UL (ref 1500–7800)
NEUTROPHILS NFR BLD AUTO: 38.4 %
NITRITE UR QL STRIP: POSITIVE
NONHDLC SERPL-MCNC: 162 MG/DL (CALC)
PH UR STRIP: 7.5 [PH] (ref 5–8)
PLATELET # BLD AUTO: 309 THOUSAND/UL (ref 140–400)
PMV BLD REES-ECKER: 10.6 FL (ref 7.5–12.5)
POTASSIUM SERPL-SCNC: 4.6 MMOL/L (ref 3.5–5.3)
PROT SERPL-MCNC: 7.3 G/DL (ref 6.1–8.1)
PROT UR QL STRIP: NEGATIVE
RBC # BLD AUTO: 4.62 MILLION/UL (ref 3.8–5.1)
RBC #/AREA URNS HPF: ABNORMAL /HPF
SL AMB EGFR AFRICAN AMERICAN: 78 ML/MIN/1.73M2
SL AMB EGFR NON AFRICAN AMERICAN: 68 ML/MIN/1.73M2
SODIUM SERPL-SCNC: 136 MMOL/L (ref 135–146)
SP GR UR STRIP: 1.01 (ref 1–1.03)
SQUAMOUS #/AREA URNS HPF: ABNORMAL /HPF
TRIGL SERPL-MCNC: 70 MG/DL
TSH SERPL-ACNC: 2.24 MIU/L (ref 0.4–4.5)
WBC # BLD AUTO: 5.8 THOUSAND/UL (ref 3.8–10.8)
WBC #/AREA URNS HPF: ABNORMAL /HPF

## 2019-11-10 NOTE — PROGRESS NOTES
ASSESSMENT/PLAN:    Weight gain 5 lb  Patient will curb wine intake as this might be at least responsible for half of her weight gain  She will continue the aerobic exercise  She will add weight training in order to make her muscles lean her to help her lose weight and to help make her bones strong    Myalgias  Patient will start calcium and magnesium but take it twice a day totaling 3 pills daily to stop her leg cramps    Elevated cholesterol  Both total and LDL increased by 15 points  Patient will pay attention to what she is eating in terms of cheese and ice cream and fatty cuts of meat    Otherwise recheck in 1 year with screening blood work prior  Colonoscopy in July 2020  Usual follow up with gyn          BMI Counseling: Body mass index is 27 25 kg/m²  The BMI is above normal  Nutrition recommendations include decreasing portion sizes and encouraging healthy choices of fruits and vegetables  Exercise recommendations include exercising 3-5 times per week   Patient will cut down on her wine consumption as well             Health Maintenance   Topic Date Due    Hepatitis C Screening  1960    Pneumococcal Vaccine: Pediatrics (0 to 5 Years) and At-Risk Patients (6 to 59 Years) (1 of 3 - PCV13) 05/05/1965    BMI: Followup Plan  05/05/1977    INFLUENZA VACCINE  07/01/2019    Depression Screening PHQ  09/25/2019    Cervical Cancer Screening  02/15/2020    CRC Screening: Colonoscopy  07/19/2020    BMI: Adult  08/27/2020    MAMMOGRAM  08/27/2020    DTaP,Tdap,and Td Vaccines (4 - Td) 10/01/2022    Pneumococcal Vaccine: 65+ Years (1 of 2 - PCV13) 05/05/2024    HEPATITIS B VACCINES  Aged Out         Problem List as of 11/11/2019 Reviewed: 2/28/2019  5:09 PM by Natividad Kang PA-C    Encounter for gynecological examination (general) (routine) without abnormal findings    Last Assessment & Plan 2/28/2019 Annual Exam Written 2/28/2019  5:09 PM by Natividad Kang PA-C     Annual exam performed  mammo rx given  RTO 1 year         Mixed hyperlipidemia    Postmenopause atrophic vaginitis            Subjective:   No chief complaint on file  Patient is here for her yearly physical and recheck  The only thing that is bothering her is gaining about 5 lb since last year  We inquired about her wine consumption did some mass in order to help her make a decision regarding drinking wine  About 1 glass during the week day and more on the weekend  Follows with gyn for her internal exam in this is up-to-date  Mammogram is also up-to-date  Colonoscopy due 2020      patient ID: Ezequiel Martin is a 60 y o  female      Past Medical History:   Diagnosis Date    Actinic keratosis     last assessed - [id number]    Anemia     BRCA1 negative     neg results    Breast lump     last assessed - [id number]    Chest tightness or pressure     Resolved - [id number]    DVT (deep vein thrombosis) in pregnancy     Shoulder pain, left     last assessed - [id number]     Past Surgical History:   Procedure Laterality Date    BREAST BIOPSY Right     neg results     SECTION      x 2    COLONOSCOPY      Complete Colonoscopy    TONSILLECTOMY       Family History   Problem Relation Age of Onset    Anxiety disorder Mother     Breast cancer Mother     Pancreatic cancer Mother     Parkinsonism Father     Hypertension Father     Prostate cancer Father     Breast cancer Maternal Grandmother     Coronary artery disease Maternal Grandfather     Breast cancer Paternal Grandmother     Stroke Paternal Grandmother     Depression Daughter     Breast cancer Maternal Aunt     Breast cancer Cousin      Social History     Tobacco Use    Smoking status: Never Smoker    Smokeless tobacco: Never Used   Substance Use Topics    Alcohol use: Yes     Comment: Drinks wine    Drug use: No     Social History     Tobacco Use   Smoking Status Never Smoker   Smokeless Tobacco Never Used        MED LIST WAS REVIEWED AND UPDATED ROS    Constitutional  No fever chills no fatigue no weight loss no weight gain    Mental status  No anxiety or depression and no sleep disturbances no changes in personality or emotional problems no suicidal or homicidal ideations    Eyes  No eye pain no red eyes no visual disturbances no discharge no eye itch    ENT  No earache no hearing loss nasal discharge no sore throat no hoarseness no postnasal drip     Cardio  No chest pain no palpitations no leg edema no claudication no dyspnea on exertion no nocturnal dyspnea    Respiratory  No shortness of breath or wheeze no cough no orthopnea no dyspnea on exertion no hemoptysis no sputum production    GI  No abdominal pain no nausea no vomiting no diarrhea or constipation no bloody stools no change in bowel habits no change in weight      no dysuria hematuria no pyuria no incontinence no pelvic pain    Musculoskeletal  No myalgias arthralgias no joint swelling or stiffness no limb pain or swelling    Skin  No rashes or lesions no itchiness and no wounds    Neuro  No headache dizziness lightheadedness syncope numbness paresthesias or confusion    Heme  No swollen glands no easy bruising            Objective:      VITALS:  Wt Readings from Last 3 Encounters:   08/27/19 78 5 kg (173 lb)   02/28/19 78 8 kg (173 lb 12 8 oz)   09/25/18 76 7 kg (169 lb)     BP Readings from Last 3 Encounters:   02/28/19 118/78   09/25/18 106/68   03/19/18 110/68     Pulse Readings from Last 3 Encounters:   09/25/18 64   03/19/18 56   09/14/17 60     There is no height or weight on file to calculate BMI  Laboratory Results:    All pertinent labs and studies were reviewed with patient  during this office visit  including the following:    Lab Results   Component Value Date    WBC 5 8 10/31/2019    WBC 5 8 09/19/2018    WBC 7 29 06/09/2017    HGB 14 2 10/31/2019    HGB 14 1 09/19/2018    HGB 13 7 06/09/2017    HCT 42 1 10/31/2019    HCT 42 1 09/19/2018    HCT 40 4 06/09/2017    MCV 91 1 10/31/2019    MCV 90 5 09/19/2018    MCV 91 06/09/2017     10/31/2019     09/19/2018     06/09/2017     Lab Results   Component Value Date    K 4 6 10/31/2019    K 4 3 09/19/2018    K 3 9 06/09/2017    CL 99 10/31/2019    CL 99 09/19/2018     06/09/2017    CO2 30 10/31/2019    CO2 27 09/19/2018    CO2 30 06/09/2017    BUN 17 10/31/2019    BUN 13 09/19/2018    BUN 18 06/09/2017     Lab Results   Component Value Date    ALT 18 10/31/2019    AST 22 10/31/2019    ALKPHOS 45 10/31/2019    EGFR >60 0 06/09/2017    CALCIUM 9 4 10/31/2019     Lab Results   Component Value Date    TSHRFT4 2 24 10/31/2019    TSHRFT4 2 18 09/19/2018           Lipid Profile:   Lab Results   Component Value Date    CHOL 260 (H) 09/07/2017     Lab Results   Component Value Date    HDL 83 10/31/2019    HDL 81 09/19/2018    HDL 88 09/07/2017     No results found for: [facility]  Lab Results   Component Value Date    TRIG 70 10/31/2019    TRIG 76 09/19/2018    TRIG 78 09/07/2017       Diabetic labs (if applicable)  Lab Results   Component Value Date    HGBA1C 5 0 09/07/2017     Lab Results   Component Value Date    GLUF 110 (H) 06/09/2017    CREATININE 0 92 10/31/2019          Physical Exam      Gen    No acute distress well-appearing well-nourished appears stated age    Mental status  Good judgment and insight oriented to time person and place, recent and remote memory intact mood and affect normal cooperative and patient is reasonable    HEENT  PERRLA 3 mm, EOMI without nystagmus, TMs clear, turbinates open pink no exudate, pharynx benign, tongue midline    Neck   supple no masses trachea midline positive click normal carotid upstrokes with no bruits    Cor  Regular rhythm without ectopy or murmur, no S3-S4, normal palpation that is no heave lift or thrill    Vascular  No edema, good pedal pulses    Lungs  CTA bilaterally in no respiratory distress no wheezes rhonchi or rales, normal to palpation no tactile fremitus    Abdomen  Soft, no palpable masses, no hepatosplenomegaly, normal bowel sounds, nontender    Lymphatics  No palpable nodes in the neck, supraclavicular area, axilla, or groin     Musculoskeletal  No clubbing cyanosis or edema muscle tone normal    Skin  no rashes or abnormal appearing lesions    Neuro  Normal ambulation, cranial nerves 2-12 grossly intact, higher functioning with reasoning intact

## 2019-11-11 ENCOUNTER — OFFICE VISIT (OUTPATIENT)
Dept: FAMILY MEDICINE CLINIC | Facility: CLINIC | Age: 59
End: 2019-11-11
Payer: COMMERCIAL

## 2019-11-11 VITALS
HEIGHT: 67 IN | SYSTOLIC BLOOD PRESSURE: 110 MMHG | RESPIRATION RATE: 16 BRPM | BODY MASS INDEX: 27.51 KG/M2 | HEART RATE: 60 BPM | DIASTOLIC BLOOD PRESSURE: 60 MMHG | WEIGHT: 175.3 LBS

## 2019-11-11 DIAGNOSIS — M79.10 MYALGIA: ICD-10-CM

## 2019-11-11 DIAGNOSIS — Z00.00 ROUTINE GENERAL MEDICAL EXAMINATION AT A HEALTH CARE FACILITY: Primary | ICD-10-CM

## 2019-11-11 DIAGNOSIS — E78.2 MIXED HYPERLIPIDEMIA: ICD-10-CM

## 2019-11-11 PROCEDURE — 99396 PREV VISIT EST AGE 40-64: CPT | Performed by: FAMILY MEDICINE

## 2019-11-11 PROCEDURE — 99213 OFFICE O/P EST LOW 20 MIN: CPT | Performed by: FAMILY MEDICINE

## 2019-11-11 NOTE — PATIENT INSTRUCTIONS
Otherwise recheck in 1 year with screening blood work prior  Colonoscopy in July 2020  Usual follow up with gyn            Weight gain 5 lb  Patient will curb wine intake as this might be at least responsible for half of her weight gain  She will continue the aerobic exercise  She will add weight training in order to make her muscles lean her to help her lose weight and to help make her bones strong    Myalgias  Patient will start calcium and magnesium but take it twice a day totaling 3 pills daily to stop her leg cramps    Elevated cholesterol  Both total and LDL increased by 15 points  Patient will pay attention to what she is eating in terms of cheese and ice cream and fatty cuts of meat

## 2019-11-11 NOTE — PROGRESS NOTES
SUBJECTIVE:-------------------------------------------------------------------------------------------    Shemar Segal is a 60 y o   female and is here for routine health maintenance  Health Maintenance   Topic Date Due    Hepatitis C Screening  1960    Pneumococcal Vaccine: Pediatrics (0 to 5 Years) and At-Risk Patients (6 to 59 Years) (1 of 3 - PCV13) 05/05/1965    HIV Screening  05/05/1974    BMI: Followup Plan  05/05/1977    Cervical Cancer Screening  02/15/2020    CRC Screening: Colonoscopy  07/19/2020    MAMMOGRAM  08/27/2020    Depression Screening PHQ  11/11/2020    BMI: Adult  11/11/2020    DTaP,Tdap,and Td Vaccines (4 - Td) 10/01/2022    Pneumococcal Vaccine: 65+ Years (1 of 2 - PCV13) 05/05/2024    INFLUENZA VACCINE  Completed    HEPATITIS B VACCINES  Aged [employer] History   Administered Date(s) Administered    DTP 1960, 01/01/1964, 01/01/1965    Hep B, adult 01/01/1996    INFLUENZA 11/06/2006, 11/14/2007, 11/07/2008, 10/23/2009, 10/19/2010, 10/21/2011, 09/26/2012, 09/28/2012, 09/27/2013, 10/01/2013, 10/12/2015, 10/13/2016, 09/11/2018    IPV 01/01/1965    Influenza Quadrivalent 3 years and older 09/01/2018    Influenza Quadrivalent, 6-35 Months IM 10/12/2015, 10/13/2016, 09/14/2017    Influenza TIV (IM) 09/26/2012, 10/01/2013    Influenza, injectable, quadrivalent, preservative free 0 5 mL 10/22/2019    MMR 01/01/1965    Td (adult), adsorbed 10/01/2012    Tdap 01/01/1965, 10/01/2012    Tuberculin Skin Test-PPD Intradermal 04/29/2014, 04/29/2014, 05/01/2014    Zoster 05/11/2017         Diet and Physical Activity  Diet: well balanced diet  Body mass index is 27 25 kg/m²    Exercise: frequently      General Health  Hearing:  Is normal  Vision: sees ophthalmologist/optometrist yearly  Dental:  Sees dentist every 6 months      Smoker no ASSESSMENT/PLAN:-------------------------------------------------------------------------------------------    Patient's physical is up-to-date   Immunizations are up-to-date  Cancer screenings are up-to-date, colonoscopy July 2020      Patient instructed on exercise  Patient instructed on healthy choices for diet       NEXT PHYSICAL 1 YEAR          The following portions of the patient's history were reviewed and updated as appropriate: allergies, current medications, past family history, past medical history, past social history, past surgical history and problem list       OBJECTIVE:---------------------------------------------------------------------------------------------------    /60 (BP Location: Left arm, Patient Position: Sitting, Cuff Size: Standard)   Pulse 60   Resp 16   Ht 5' 7 25" (1 708 m)   Wt 79 5 kg (175 lb 4 8 oz)   LMP  (LMP Unknown)   Breastfeeding? No   BMI 27 25 kg/m²   Wt Readings from Last 3 Encounters:   11/11/19 79 5 kg (175 lb 4 8 oz)   08/27/19 78 5 kg (173 lb)   02/28/19 78 8 kg (173 lb 12 8 oz)     BP Readings from Last 3 Encounters:   11/11/19 110/60   02/28/19 118/78   09/25/18 106/68     Pulse Readings from Last 3 Encounters:   11/11/19 60   09/25/18 64   03/19/18 56     Body mass index is 27 25 kg/m²    Health Maintenance   Topic Date Due    Hepatitis C Screening  1960    Pneumococcal Vaccine: Pediatrics (0 to 5 Years) and At-Risk Patients (6 to 59 Years) (1 of 3 - PCV13) 05/05/1965    HIV Screening  05/05/1974    BMI: Followup Plan  05/05/1977    Cervical Cancer Screening  02/15/2020    CRC Screening: Colonoscopy  07/19/2020    MAMMOGRAM  08/27/2020    Depression Screening PHQ  11/11/2020    BMI: Adult  11/11/2020    DTaP,Tdap,and Td Vaccines (4 - Td) 10/01/2022    Pneumococcal Vaccine: 65+ Years (1 of 2 - PCV13) 05/05/2024    INFLUENZA VACCINE  Completed    HEPATITIS B VACCINES  Aged Out       Laboratory Results:   Lab Results   Component Value Date    WBC 5 8 10/31/2019    WBC 5 8 09/19/2018    WBC 7 29 06/09/2017    HGB 14 2 10/31/2019    HGB 14 1 09/19/2018    HGB 13 7 06/09/2017    HCT 42 1 10/31/2019    HCT 42 1 09/19/2018    HCT 40 4 06/09/2017    MCV 91 1 10/31/2019    MCV 90 5 09/19/2018    MCV 91 06/09/2017     10/31/2019     09/19/2018     06/09/2017     Lab Results   Component Value Date    BUN 17 10/31/2019    BUN 13 09/19/2018    BUN 18 06/09/2017     Lab Results   Component Value Date    ALT 18 10/31/2019    ALT 20 09/19/2018    ALT 31 06/09/2017    AST 22 10/31/2019    AST 25 09/19/2018    AST 27 06/09/2017     No results found for: TSH  Lab Results   Component Value Date    HGBA1C 5 0 09/07/2017       Lipid Profile:   Lab Results   Component Value Date    CHOL 260 (H) 09/07/2017     Lab Results   Component Value Date    HDL 83 10/31/2019    HDL 81 09/19/2018    HDL 88 09/07/2017     No results found for: [facility]  Lab Results   Component Value Date    TRIG 70 10/31/2019    TRIG 76 09/19/2018    TRIG 78 09/07/2017       ROS:   12 point review of systems negative            PHYSICAL EXAM:    Gen    No acute distress well-appearing well-nourished appears stated age    Mental status  Good judgment and insight oriented to time person and place, recent and remote memory intact mood and affect normal cooperative and patient is reasonable    HEENT  PERRLA 3 mm, EOMI without nystagmus, TMs clear, turbinates open pink no exudate, pharynx benign, tongue midline    Neck   supple no masses trachea midline positive click normal carotid upstrokes with no bruits    Cor  Regular rhythm without ectopy or murmur, no S3-S4, normal palpation that is no heave lift or thrill    Vascular  No edema, good pedal pulses    Lungs  CTA bilaterally in no respiratory distress no wheezes rhonchi or rales, normal to palpation no tactile fremitus    Abdomen  Soft, no palpable masses, no hepatosplenomegaly, normal bowel sounds, nontender    Lymphatics  No palpable nodes in the neck, supraclavicular area, axilla, or groin     Musculoskeletal  No clubbing cyanosis or edema muscle tone normal 12 point review of systems is negative    Skin  no rashes or abnormal appearing lesions    Neuro  Normal ambulation, cranial nerves 2-12 grossly intact, higher functioning with reasoning intact

## 2019-11-21 ENCOUNTER — OFFICE VISIT (OUTPATIENT)
Dept: FAMILY MEDICINE CLINIC | Facility: CLINIC | Age: 59
End: 2019-11-21
Payer: COMMERCIAL

## 2019-11-21 VITALS
SYSTOLIC BLOOD PRESSURE: 128 MMHG | BODY MASS INDEX: 27.36 KG/M2 | DIASTOLIC BLOOD PRESSURE: 80 MMHG | HEIGHT: 67 IN | HEART RATE: 72 BPM | WEIGHT: 174.3 LBS | RESPIRATION RATE: 16 BRPM

## 2019-11-21 DIAGNOSIS — I49.02 HEART FLUTTER, VENTRICULAR (HCC): ICD-10-CM

## 2019-11-21 PROCEDURE — 93000 ELECTROCARDIOGRAM COMPLETE: CPT | Performed by: FAMILY MEDICINE

## 2019-11-21 PROCEDURE — 1036F TOBACCO NON-USER: CPT | Performed by: FAMILY MEDICINE

## 2019-11-21 PROCEDURE — 99214 OFFICE O/P EST MOD 30 MIN: CPT | Performed by: FAMILY MEDICINE

## 2019-11-21 NOTE — PROGRESS NOTES
Assessment/Plan:    Fluttering of the heart  We will do a 48 hour Holter and see if we could capture any of these rhythms  If we do we will see if the correlate with anything on her EKG  If they do not correlate with anything they may be anxiety year nothing for us to worry about    If we do not catch any on the 48 hour Holter we may need to send to Dr Gee Michael for evaluation  Subjective:   Joan Hardwick is a 60 y  o female  Chief Complaint   Patient presents with    Heart flutters     one time last night     The patient was just here for full physical less than a month ago  Everything was fine but she did mention feeling heart flutters as we near the end of our exam  Yesterday she got home from school around 3:00 p m  And felt very tired so she lay down  Her fallen off she got up to check it and did not answer because was a   As she lay down she had 1 fleeting flutter in her chest   The rest of the evening was fine  She went out with her  for dinner and was fine    Other episodes over the last couple years have been the same, they have been fleeting and non provoked    Patient drinks about 220 oz months of coffee daily  She throws it least half of it away as a gets colds  Yesterday she did have a cup a T  She does not really drink any call us nor hot chocolate or anything calf needed            Past Medical History:   Diagnosis Date    Actinic keratosis     last assessed - [id number]    Anemia     BRCA1 negative     neg results    Breast lump     last assessed - [id number]    Chest tightness or pressure     Resolved - [id number]    DVT (deep vein thrombosis) in pregnancy     Shoulder pain, left     last assessed - [id number]     Social History     Tobacco Use    Smoking status: Never Smoker    Smokeless tobacco: Never Used   Substance Use Topics    Alcohol use: Yes     Comment: Drinks wine    Drug use: No     Family History   Problem Relation Age of Onset    Anxiety disorder Mother     Breast cancer Mother     Pancreatic cancer Mother    Yana Vieira Parkinsonism Father     Hypertension Father     Prostate cancer Father     Breast cancer Maternal Grandmother     Coronary artery disease Maternal Grandfather     Breast cancer Paternal Grandmother     Stroke Paternal Grandmother     Depression Daughter     Breast cancer Maternal Aunt     Breast cancer Cousin     Alcohol abuse Neg Hx     Substance Abuse Neg Hx        MEDICATIONS REVIEWED AND UPDATED    10 point review of systems performed, the remainder of the ROS is negative except for what is noted in the history of chief complaint    Objective:  EKG normal sinus rhythm bradycardic    Vitals:    11/21/19 1452   BP: 128/80   Pulse: 72   Resp: 16     Body mass index is 27 1 kg/m²      Physical Exam  Constitutional  Appears healthy, Looks well, Appearance consistent with age    Mental Status  Alert, Oriented, Cooperative, Memory function normal , clean, and reasonable    Neck  No neck mass, No thyromegaly, Good carotid upstrokes bilaterally, trachea midline positive click    Respiratory  Breath sounds normal, No rales, No rhonchi, No wheezing, normal palpation    Cardiac   Regular rhythm without ectopy or murmur no S3-S4, no heave lift or thrill to palpation    Vascular  No leg edema, No pedal edema    Muscular skeletal  No clubbing cyanosis , muscle tone normal    Skin  No appreciable rashes or abnormal appearing lesions

## 2019-11-21 NOTE — PATIENT INSTRUCTIONS
Fluttering of the heart  We will do a 48 hour Holter and see if we could capture any of these rhythms  If we do we will see if the correlate with anything on her EKG  If they do not correlate with anything they may be anxiety year nothing for us to worry about    If we do not catch any on the 48 hour Holter we may need to send to Dr Pedrito Lawrence for evaluation      Patient does not get a call from this office after she turns the Holter in within 5 business days she is to call us

## 2019-12-03 ENCOUNTER — HOSPITAL ENCOUNTER (OUTPATIENT)
Dept: NON INVASIVE DIAGNOSTICS | Facility: HOSPITAL | Age: 59
Discharge: HOME | End: 2019-12-03
Payer: COMMERCIAL

## 2019-12-03 DIAGNOSIS — I49.02 HEART FLUTTER, VENTRICULAR (HCC): ICD-10-CM

## 2019-12-03 PROCEDURE — 93226 XTRNL ECG REC<48 HR SCAN A/R: CPT

## 2019-12-03 PROCEDURE — 93225 XTRNL ECG REC<48 HRS REC: CPT

## 2019-12-06 PROCEDURE — 93227 XTRNL ECG REC<48 HR R&I: CPT | Performed by: INTERNAL MEDICINE

## 2019-12-06 NOTE — RESULT ENCOUNTER NOTE
Spoke to patient may self  Had only couple episodes of palpitations during the testing  Agrees to the wait to see specialists  Only if things get worse will she do that  She will let me know

## 2020-03-04 ENCOUNTER — ANNUAL EXAM (OUTPATIENT)
Dept: OBGYN CLINIC | Facility: CLINIC | Age: 60
End: 2020-03-04
Payer: COMMERCIAL

## 2020-03-04 VITALS
HEIGHT: 67 IN | BODY MASS INDEX: 27 KG/M2 | WEIGHT: 172 LBS | DIASTOLIC BLOOD PRESSURE: 62 MMHG | SYSTOLIC BLOOD PRESSURE: 110 MMHG

## 2020-03-04 DIAGNOSIS — Z01.419 ENCNTR FOR GYN EXAM (GENERAL) (ROUTINE) W/O ABN FINDINGS: Primary | ICD-10-CM

## 2020-03-04 DIAGNOSIS — Z80.3 FAMILY HISTORY OF BREAST CANCER: ICD-10-CM

## 2020-03-04 DIAGNOSIS — Z12.31 ENCOUNTER FOR SCREENING MAMMOGRAM FOR MALIGNANT NEOPLASM OF BREAST: ICD-10-CM

## 2020-03-04 PROCEDURE — S0612 ANNUAL GYNECOLOGICAL EXAMINA: HCPCS | Performed by: PHYSICIAN ASSISTANT

## 2020-03-04 PROCEDURE — 3008F BODY MASS INDEX DOCD: CPT | Performed by: FAMILY MEDICINE

## 2020-03-04 NOTE — PROGRESS NOTES
Ivy Eduardo   1960    CC:  Yearly exam    S:  60 y o  female here for yearly exam  She is postmenopausal and has had no vaginal bleeding  She denies vaginal discharge, itching, odor or dryness  Sexual activity: She is sexually active with some dryness; she uses silicone lubricants with good results  Last Pap: 2/15/2018 neg/neg  Last Mammo: 8/27/19 neg  Last Colonoscopy:  2010 neg - due 2020    We reviewed [facility] guidelines for Pap testing  Family hx of breast cancer: maternal aunt, maternal cousin, maternal grandmother, mother  Family hx of ovarian cancer:  no  Family hx of colon cancer:  no    Current Outpatient Medications:     aspirin 81 mg chewable tablet, Chew 1 tablet daily, Disp: , Rfl:     Calcium-Magnesium (RODRICK/MAG CITRATE) 250-125 MG TABS, 3 daily, Disp: , Rfl:     Glucosamine-Chondroitin 250-200 MG TABS, Take 2 tablets by mouth daily, Disp: , Rfl:     Iron-Vitamin C (IRON 100/C) 100-250 MG TABS, Take 1 tablet by mouth daily, Disp: , Rfl:     Multiple Vitamins-Minerals (WOMENS ONE DAILY) TABS, Take 1 tablet by mouth daily, Disp: , Rfl:     Omega-3 Fatty Acids (OMEGA-3 FISH OIL) 1000 MG CAPS, Take 1 capsule by mouth daily, Disp: , Rfl:     Probiotic Product (PROBIOTIC-10 PO), Take by mouth 2 (two) times a day , Disp: , Rfl:   Social History     Socioeconomic History    Marital status: /Civil Union     Spouse name: Not on file    Number of children: Not on file    Years of education: Not on file    Highest education level: Not on file   Occupational History    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   Social Needs    Financial resource strain: Not on file    Food insecurity:     Worry: Not on file     Inability: Not on file    Transportation needs:     Medical: Not on file     Non-medical: Not on file   Tobacco Use    Smoking status: Never Smoker    Smokeless tobacco: Never Used   Substance and Sexual Activity    Alcohol use: Yes     Comment: Drinks wine    Drug use: No    Sexual activity: Yes     Partners: Male     Birth control/protection: None   Lifestyle    Physical activity:     Days per week: Not on file     Minutes per session: Not on file    Stress: Not on file   Relationships    Social connections:     Talks on phone: Not on file     Gets together: Not on file     Attends Evangelical service: Not on file     Active member of club or organization: Not on file     Attends meetings of clubs or organizations: Not on file     Relationship status: Not on file    Intimate partner violence:     Fear of current or ex partner: Not on file     Emotionally abused: Not on file     Physically abused: Not on file     Forced sexual activity: Not on file   Other Topics Concern    Not on file   Social History Narrative    Activities:  Treadmill    Always uses seat belt    Caffeine use - 2 cups coffee per day    Exercise: Running    Exercise: Walking    Exercises moderately less than 3 times a week    Has smoke detectors    Weight training     Family History   Problem Relation Age of Onset    Anxiety disorder Mother     Breast cancer Mother     Pancreatic cancer Mother     Parkinsonism Father     Hypertension Father     Prostate cancer Father     Breast cancer Maternal Grandmother     Coronary artery disease Maternal Grandfather     Breast cancer Paternal Grandmother     Stroke Paternal Grandmother     Depression Daughter     Breast cancer Maternal Aunt     Breast cancer Cousin     Alcohol abuse Neg Hx     Substance Abuse Neg Hx      Past Medical History:   Diagnosis Date    Actinic keratosis     last assessed - [id number]    Anemia     BRCA1 negative     neg results    Breast lump     last assessed - [id number]    Chest tightness or pressure     Resolved - [id number]    DVT (deep vein thrombosis) in pregnancy     Shoulder pain, left     last assessed - [id number]        Review of Systems   Respiratory: Negative  Cardiovascular: Negative      Gastrointestinal: Negative for constipation and diarrhea  Genitourinary: Negative for difficulty urinating, pelvic pain, vaginal bleeding, vaginal discharge, itching or odor  O:  Blood pressure 110/62, height 5' 6 93" (1 7 m), weight 78 kg (172 lb), not currently breastfeeding  Patient appears well and is not in distress  Neck is supple without masses  Breasts are symmetrical without mass, tenderness, nipple discharge, skin changes or adenopathy  Abdomen is soft and nontender without masses  External genitals are normal without lesions or rashes  Urethral meatus and urethra are normal  Bladder is normal to palpation  Vagina is normal without discharge or bleeding  Cervix is normal without discharge or lesion  Uterus is normal, mobile, nontender without palpable mass  Adnexa are normal, nontender, without palpable mass  A:  Yearly exam      P:   Pap 2023   Mammo slip given   Colonoscopy due 2020      RTO one year for yearly exam or sooner as needed

## 2020-05-28 ENCOUNTER — OFFICE VISIT (OUTPATIENT)
Dept: FAMILY MEDICINE CLINIC | Facility: CLINIC | Age: 60
End: 2020-05-28
Payer: COMMERCIAL

## 2020-05-28 VITALS
RESPIRATION RATE: 15 BRPM | BODY MASS INDEX: 27.22 KG/M2 | HEART RATE: 74 BPM | WEIGHT: 173.4 LBS | SYSTOLIC BLOOD PRESSURE: 122 MMHG | DIASTOLIC BLOOD PRESSURE: 76 MMHG | TEMPERATURE: 98 F

## 2020-05-28 DIAGNOSIS — Z78.0 MENOPAUSE: Primary | ICD-10-CM

## 2020-05-28 DIAGNOSIS — H00.014 HORDEOLUM EXTERNUM OF LEFT UPPER EYELID: ICD-10-CM

## 2020-05-28 PROCEDURE — 1036F TOBACCO NON-USER: CPT | Performed by: FAMILY MEDICINE

## 2020-05-28 PROCEDURE — 99213 OFFICE O/P EST LOW 20 MIN: CPT | Performed by: FAMILY MEDICINE

## 2020-11-04 ENCOUNTER — HOSPITAL ENCOUNTER (OUTPATIENT)
Dept: BONE DENSITY | Facility: IMAGING CENTER | Age: 60
Discharge: HOME | End: 2020-11-04
Payer: COMMERCIAL

## 2020-11-04 VITALS — BODY MASS INDEX: 27.31 KG/M2 | HEIGHT: 67 IN | WEIGHT: 174 LBS

## 2020-11-04 DIAGNOSIS — Z78.0 MENOPAUSE: ICD-10-CM

## 2020-11-04 DIAGNOSIS — Z12.31 ENCOUNTER FOR SCREENING MAMMOGRAM FOR MALIGNANT NEOPLASM OF BREAST: ICD-10-CM

## 2020-11-04 PROCEDURE — 77063 BREAST TOMOSYNTHESIS BI: CPT

## 2020-11-04 PROCEDURE — 77080 DXA BONE DENSITY AXIAL: CPT

## 2020-11-04 PROCEDURE — 77067 SCR MAMMO BI INCL CAD: CPT

## 2020-11-05 ENCOUNTER — TELEPHONE (OUTPATIENT)
Dept: FAMILY MEDICINE CLINIC | Facility: CLINIC | Age: 60
End: 2020-11-05

## 2020-11-05 DIAGNOSIS — E55.9 VITAMIN D DEFICIENCY: ICD-10-CM

## 2020-11-05 DIAGNOSIS — E78.2 MIXED HYPERLIPIDEMIA: Primary | ICD-10-CM

## 2020-11-05 DIAGNOSIS — Z79.899 ENCOUNTER FOR LONG-TERM (CURRENT) USE OF MEDICATIONS: ICD-10-CM

## 2020-11-25 LAB
25(OH)D3 SERPL-MCNC: 59 NG/ML (ref 30–100)
ALBUMIN SERPL-MCNC: 4.1 G/DL (ref 3.6–5.1)
ALBUMIN/GLOB SERPL: 1.4 (CALC) (ref 1–2.5)
ALP SERPL-CCNC: 47 U/L (ref 37–153)
ALT SERPL-CCNC: 19 U/L (ref 6–29)
APPEARANCE UR: CLEAR
AST SERPL-CCNC: 21 U/L (ref 10–35)
BACTERIA UR QL AUTO: ABNORMAL /HPF
BASOPHILS # BLD AUTO: 90 CELLS/UL (ref 0–200)
BASOPHILS NFR BLD AUTO: 1.6 %
BILIRUB SERPL-MCNC: 0.6 MG/DL (ref 0.2–1.2)
BILIRUB UR QL STRIP: NEGATIVE
BUN SERPL-MCNC: 15 MG/DL (ref 7–25)
BUN/CREAT SERPL: NORMAL (CALC) (ref 6–22)
CALCIUM SERPL-MCNC: 9.4 MG/DL (ref 8.6–10.4)
CHLORIDE SERPL-SCNC: 100 MMOL/L (ref 98–110)
CHOLEST SERPL-MCNC: 235 MG/DL
CHOLEST/HDLC SERPL: 3 (CALC)
CO2 SERPL-SCNC: 30 MMOL/L (ref 20–32)
COLOR UR: YELLOW
CREAT SERPL-MCNC: 0.91 MG/DL (ref 0.5–0.99)
EOSINOPHIL # BLD AUTO: 179 CELLS/UL (ref 15–500)
EOSINOPHIL NFR BLD AUTO: 3.2 %
ERYTHROCYTE [DISTWIDTH] IN BLOOD BY AUTOMATED COUNT: 13.2 % (ref 11–15)
GLOBULIN SER CALC-MCNC: 2.9 G/DL (CALC) (ref 1.9–3.7)
GLUCOSE SERPL-MCNC: 91 MG/DL (ref 65–99)
GLUCOSE UR QL STRIP: NEGATIVE
HCT VFR BLD AUTO: 41.6 % (ref 35–45)
HDLC SERPL-MCNC: 78 MG/DL
HGB BLD-MCNC: 13.9 G/DL (ref 11.7–15.5)
HGB UR QL STRIP: NEGATIVE
HYALINE CASTS #/AREA URNS LPF: ABNORMAL /LPF
KETONES UR QL STRIP: NEGATIVE
LDLC SERPL CALC-MCNC: 141 MG/DL (CALC)
LEUKOCYTE ESTERASE UR QL STRIP: ABNORMAL
LYMPHOCYTES # BLD AUTO: 2806 CELLS/UL (ref 850–3900)
LYMPHOCYTES NFR BLD AUTO: 50.1 %
MCH RBC QN AUTO: 30.4 PG (ref 27–33)
MCHC RBC AUTO-ENTMCNC: 33.4 G/DL (ref 32–36)
MCV RBC AUTO: 91 FL (ref 80–100)
MONOCYTES # BLD AUTO: 482 CELLS/UL (ref 200–950)
MONOCYTES NFR BLD AUTO: 8.6 %
NEUTROPHILS # BLD AUTO: 2044 CELLS/UL (ref 1500–7800)
NEUTROPHILS NFR BLD AUTO: 36.5 %
NITRITE UR QL STRIP: NEGATIVE
NONHDLC SERPL-MCNC: 157 MG/DL (CALC)
PH UR STRIP: 8 [PH] (ref 5–8)
PLATELET # BLD AUTO: 301 THOUSAND/UL (ref 140–400)
PMV BLD REES-ECKER: 10.4 FL (ref 7.5–12.5)
POTASSIUM SERPL-SCNC: 4.5 MMOL/L (ref 3.5–5.3)
PROT SERPL-MCNC: 7 G/DL (ref 6.1–8.1)
PROT UR QL STRIP: NEGATIVE
RBC # BLD AUTO: 4.57 MILLION/UL (ref 3.8–5.1)
RBC #/AREA URNS HPF: ABNORMAL /HPF
SL AMB EGFR AFRICAN AMERICAN: 79 ML/MIN/1.73M2
SL AMB EGFR NON AFRICAN AMERICAN: 68 ML/MIN/1.73M2
SODIUM SERPL-SCNC: 136 MMOL/L (ref 135–146)
SP GR UR STRIP: 1.01 (ref 1–1.03)
SQUAMOUS #/AREA URNS HPF: ABNORMAL /HPF
TRIGL SERPL-MCNC: 72 MG/DL
TSH SERPL-ACNC: 2.68 MIU/L (ref 0.4–4.5)
WBC # BLD AUTO: 5.6 THOUSAND/UL (ref 3.8–10.8)
WBC #/AREA URNS HPF: ABNORMAL /HPF

## 2020-12-05 PROBLEM — E78.00 HYPERCHOLESTEROLEMIA: Status: ACTIVE | Noted: 2020-12-05

## 2020-12-05 PROBLEM — I49.8 PERIODIC HEART FLUTTER: Status: ACTIVE | Noted: 2020-12-05

## 2020-12-07 ENCOUNTER — OFFICE VISIT (OUTPATIENT)
Dept: FAMILY MEDICINE CLINIC | Facility: CLINIC | Age: 60
End: 2020-12-07
Payer: COMMERCIAL

## 2020-12-07 VITALS
HEART RATE: 68 BPM | TEMPERATURE: 98.2 F | WEIGHT: 172.2 LBS | BODY MASS INDEX: 27.03 KG/M2 | SYSTOLIC BLOOD PRESSURE: 110 MMHG | DIASTOLIC BLOOD PRESSURE: 72 MMHG | RESPIRATION RATE: 18 BRPM | HEIGHT: 67 IN

## 2020-12-07 DIAGNOSIS — Z00.00 ROUTINE GENERAL MEDICAL EXAMINATION AT A HEALTH CARE FACILITY: Primary | ICD-10-CM

## 2020-12-07 DIAGNOSIS — E78.2 MIXED HYPERLIPIDEMIA: ICD-10-CM

## 2020-12-07 DIAGNOSIS — E78.00 HYPERCHOLESTEROLEMIA: ICD-10-CM

## 2020-12-07 DIAGNOSIS — N95.2 POSTMENOPAUSE ATROPHIC VAGINITIS: ICD-10-CM

## 2020-12-07 DIAGNOSIS — M81.0 AGE-RELATED OSTEOPOROSIS WITHOUT CURRENT PATHOLOGICAL FRACTURE: ICD-10-CM

## 2020-12-07 DIAGNOSIS — M79.10 MYALGIA: ICD-10-CM

## 2020-12-07 DIAGNOSIS — I49.8 PERIODIC HEART FLUTTER: ICD-10-CM

## 2020-12-07 PROCEDURE — 99214 OFFICE O/P EST MOD 30 MIN: CPT | Performed by: FAMILY MEDICINE

## 2020-12-07 PROCEDURE — 3008F BODY MASS INDEX DOCD: CPT | Performed by: FAMILY MEDICINE

## 2020-12-07 PROCEDURE — 1036F TOBACCO NON-USER: CPT | Performed by: FAMILY MEDICINE

## 2020-12-07 PROCEDURE — 99396 PREV VISIT EST AGE 40-64: CPT | Performed by: FAMILY MEDICINE

## 2020-12-07 RX ORDER — RISEDRONATE SODIUM 35 MG/1
35 TABLET, FILM COATED ORAL
Qty: 12 TABLET | Refills: 3 | Status: SHIPPED | OUTPATIENT
Start: 2020-12-07 | End: 2021-10-18

## 2020-12-07 RX ORDER — ZINC GLUCONATE 50 MG
50 TABLET ORAL DAILY
COMMUNITY

## 2020-12-08 ENCOUNTER — TELEPHONE (OUTPATIENT)
Dept: ADMINISTRATIVE | Facility: OTHER | Age: 60
End: 2020-12-08

## 2021-03-05 ENCOUNTER — OFFICE VISIT (OUTPATIENT)
Dept: FAMILY MEDICINE CLINIC | Facility: CLINIC | Age: 61
End: 2021-03-05
Payer: COMMERCIAL

## 2021-03-05 VITALS
WEIGHT: 173.2 LBS | DIASTOLIC BLOOD PRESSURE: 70 MMHG | RESPIRATION RATE: 16 BRPM | TEMPERATURE: 98.3 F | BODY MASS INDEX: 26.25 KG/M2 | HEART RATE: 54 BPM | SYSTOLIC BLOOD PRESSURE: 122 MMHG | HEIGHT: 68 IN

## 2021-03-05 DIAGNOSIS — Z12.31 ENCOUNTER FOR SCREENING MAMMOGRAM FOR MALIGNANT NEOPLASM OF BREAST: ICD-10-CM

## 2021-03-05 DIAGNOSIS — I49.9 IRREGULAR HEART BEAT: Primary | ICD-10-CM

## 2021-03-05 DIAGNOSIS — Z12.11 SCREENING FOR MALIGNANT NEOPLASM OF COLON: ICD-10-CM

## 2021-03-05 PROCEDURE — 1036F TOBACCO NON-USER: CPT | Performed by: FAMILY MEDICINE

## 2021-03-05 PROCEDURE — 3725F SCREEN DEPRESSION PERFORMED: CPT | Performed by: FAMILY MEDICINE

## 2021-03-05 PROCEDURE — 93000 ELECTROCARDIOGRAM COMPLETE: CPT | Performed by: FAMILY MEDICINE

## 2021-03-05 PROCEDURE — 99214 OFFICE O/P EST MOD 30 MIN: CPT | Performed by: FAMILY MEDICINE

## 2021-03-05 RX ORDER — POLYETHYLENE GLYCOL 3350 17 G/17G
17 POWDER, FOR SOLUTION ORAL DAILY PRN
COMMUNITY

## 2021-03-05 NOTE — PROGRESS NOTES
Assessment/Plan:    Irregular heartbeat  In December 2020 patient's 48 hour Holter was negative  Her symptomatic times showed normal sinus rhythm  We will try treating patient this time with sertraline to see if this feeling goes away  25 mg for 8 days and 50 mg  We will check her back in 6 weeks, and this can be virtual    Any problems patient will call prior          BMI Counseling: Body mass index is 26 73 kg/m²  The BMI is above normal  Nutrition recommendations include decreasing portion sizes and encouraging healthy choices of fruits and vegetables  Exercise recommendations include exercising 3-5 times per week  Subjective:   Jazlyn Moya is a 63 y  o female  Chief Complaint   Patient presents with    Irregular Heart Beat     Times 2 5 months     Dizziness     Patient is here with irregular heartbeats for the last couple weeks again  1st she thought it was related to the osteoporosis med Actonel but then realized it was  Does not really feel like a flutter just feels different like she is getting extra beat  Sometimes it radiates up into her neck  In the last few weeks she has been getting it every day but cannot related to anyone incident  Thought food caused it but it does not  She stops her wine thinking that caused it but it made no difference    A lot is going on in her life Her  had a ruptured appendicitis with complications but is fine  Patient does have a family history that she could remember of a maternal aunt with anxiety only    In in December 2020 patient had a 48 hour Holter that was negative    She did have episodes during that time that were recorded and compared to the rhythm strips were found to be normal      Past Medical History:   Diagnosis Date    Actinic keratosis     last assessed - [id number]    Anemia     BRCA1 negative     neg results    Breast lump     last assessed - [id number]    Chest tightness or pressure     Resolved - [id number]    DVT (deep vein thrombosis) in pregnancy     Shoulder pain, left     last assessed - [id number]     Social History     Tobacco Use    Smoking status: Never Smoker    Smokeless tobacco: Never Used   Substance Use Topics    Alcohol use: Yes     Comment: Drinks wine    Drug use: No     Family History   Problem Relation Age of Onset    Anxiety disorder Mother     Breast cancer Mother         in 2000    Pancreatic cancer Mother 80    Parkinsonism Father     Hypertension Father     Prostate cancer Father         age unknown    Coronary artery disease Maternal Grandfather     Breast cancer Paternal Grandmother         age unknown   Maryam Powell Stroke Paternal Grandmother    Maryam Powell Depression Daughter    Maryam Powell Breast cancer Maternal Aunt         age unknown    Pancreatic cancer Maternal Aunt 80    Alcohol abuse Neg Hx     Substance Abuse Neg Hx        MEDICATIONS REVIEWED AND UPDATED    10 point review of systems performed, the remainder of the ROS is negative except for what is noted in the history of chief complaint    Objective:    Vitals:    03/05/21 1142   BP: 122/70   Pulse: (!) 54   Resp: 16   Temp: 98 3 °F (36 8 °C)     Body mass index is 26 73 kg/m²      Physical Exam    Constitutional  Appears healthy, Looks well, Appearance consistent with age    Mental Status  Alert, Oriented, Cooperative, Memory function normal , clean, and reasonable    Neck  No neck mass, No thyromegaly, Good carotid upstrokes bilaterally, trachea midline positive click    Respiratory  Breath sounds normal, No rales, No rhonchi, No wheezing, normal palpation    Cardiac   Regular rhythm without ectopy or murmur no S3-S4, no heave lift or thrill to palpation    Vascular  No leg edema, No pedal edema    Muscular skeletal  No clubbing cyanosis , muscle tone normal    Skin  No appreciable rashes or abnormal appearing lesions    EKG normal bradycardic

## 2021-03-05 NOTE — PATIENT INSTRUCTIONS
Please take half of the sertraline, that is 25 mg every day for 8 days  Then take a whole tablet    This can be taken in the morning or take it in the night, it can be taken with food or without food, just take it consistently  If you think it makes you tired and are taking it in the morning, take it in the evening  If you think it keeps you will wake, and you're taking it in the evening, take in the morning    If there are any problems prior call    We will see you back in 6 weeks or sooner if needed

## 2021-03-10 ENCOUNTER — ANNUAL EXAM (OUTPATIENT)
Dept: OBGYN CLINIC | Facility: CLINIC | Age: 61
End: 2021-03-10
Payer: COMMERCIAL

## 2021-03-10 VITALS
SYSTOLIC BLOOD PRESSURE: 102 MMHG | DIASTOLIC BLOOD PRESSURE: 64 MMHG | BODY MASS INDEX: 27.84 KG/M2 | HEIGHT: 67 IN | WEIGHT: 177.4 LBS

## 2021-03-10 DIAGNOSIS — Z12.31 ENCOUNTER FOR SCREENING MAMMOGRAM FOR MALIGNANT NEOPLASM OF BREAST: ICD-10-CM

## 2021-03-10 DIAGNOSIS — Z01.419 ENCNTR FOR GYN EXAM (GENERAL) (ROUTINE) W/O ABN FINDINGS: Primary | ICD-10-CM

## 2021-03-10 PROCEDURE — S0612 ANNUAL GYNECOLOGICAL EXAMINA: HCPCS | Performed by: PHYSICIAN ASSISTANT

## 2021-03-10 NOTE — PROGRESS NOTES
Soheila Robby   1960    CC:  Yearly exam    S:  63 y o  female here for yearly exam  She is postmenopausal and has had no vaginal bleeding  She denies vaginal discharge, itching, odor or dryness  Sexual activity: She is sexually active without pain, bleeding or dryness when she uses a silicone lubricant  She retired this past year  Last Pap: 2/15/18 neg/neg  Last Mammo:  11/4/20 neg  Last Colonoscopy:  2010 (due now)  Last DEXA:  11/4/20 osteoporosis - FRAX 3/19 - on Actonel weekly via her PCP  Taking calcium, vitamin D    We reviewed ASCCP guidelines for Pap testing  Family hx of breast cancer: mother, PGM, maternal aunt, maternal cousin  Family hx of ovarian cancer: no  Family hx of colon cancer: no      Current Outpatient Medications:     aspirin 81 mg chewable tablet, Chew 1 tablet daily, Disp: , Rfl:     Calcium-Magnesium (RODRICK/MAG CITRATE) 250-125 MG TABS, 3 daily, Disp: , Rfl:     Glucosamine-Chondroitin 250-200 MG TABS, Take 2 tablets by mouth daily, Disp: , Rfl:     Iron-Vitamin C (IRON 100/C) 100-250 MG TABS, Take 1 tablet by mouth daily, Disp: , Rfl:     Multiple Vitamins-Minerals (WOMENS ONE DAILY) TABS, Take 1 tablet by mouth daily, Disp: , Rfl:     Omega-3 Fatty Acids (OMEGA-3 FISH OIL) 1000 MG CAPS, Take 1 capsule by mouth daily, Disp: , Rfl:     polyethylene glycol (MiraLax) 17 GM/SCOOP powder, Take 17 g by mouth daily as needed, Disp: , Rfl:     Probiotic Product (PROBIOTIC-10 PO), Take by mouth 2 (two) times a day , Disp: , Rfl:     risedronate (ACTONEL) 35 mg tablet, Take 1 tablet (35 mg total) by mouth every 7 days with water on empty stomach, nothing by mouth or lie down for next 30 minutes  , Disp: 12 tablet, Rfl: 3    zinc gluconate 50 mg tablet, Take 50 mg by mouth daily, Disp: , Rfl:   Social History     Socioeconomic History    Marital status: /Civil Union     Spouse name: Not on file    Number of children: Not on file    Years of education: Not on file    Highest education level: Not on file   Occupational History    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   Social Needs    Financial resource strain: Not on file    Food insecurity     Worry: Not on file     Inability: Not on file    Transportation needs     Medical: Not on file     Non-medical: Not on file   Tobacco Use    Smoking status: Never Smoker    Smokeless tobacco: Never Used   Substance and Sexual Activity    Alcohol use: Yes     Comment: Drinks wine    Drug use: No    Sexual activity: Yes     Partners: Male     Birth control/protection: None   Lifestyle    Physical activity     Days per week: Not on file     Minutes per session: Not on file    Stress: Not on file   Relationships    Social connections     Talks on phone: Not on file     Gets together: Not on file     Attends [employer] service: Not on file     Active member of club or organization: Not on file     Attends meetings of clubs or organizations: Not on file     Relationship status: Not on file    Intimate partner violence     Fear of current or ex partner: Not on file     Emotionally abused: Not on file     Physically abused: Not on file     Forced sexual activity: Not on file   Other Topics Concern    Not on file   Social History Narrative    Activities:  Treadmill    Always uses seat belt    Caffeine use - 2 cups coffee per day    Exercise: Running    Exercise: Walking    Exercises moderately less than 3 times a week    Has smoke detectors    Weight training     Family History   Problem Relation Age of Onset    Anxiety disorder Mother     Breast cancer Mother         in 2000    Pancreatic cancer Mother 80    Parkinsonism Father     Hypertension Father     Prostate cancer Father         age unknown    Coronary artery disease Maternal Grandfather     Breast cancer Paternal Grandmother         age unknown    Stroke Paternal Grandmother     Depression Daughter     Breast cancer Maternal Aunt         age unknown   Thomas Calvillo Pancreatic cancer Maternal Aunt 80    Alcohol abuse Neg Hx     Substance Abuse Neg Hx      Past Medical History:   Diagnosis Date    Actinic keratosis     last assessed - [id number]    Anemia     BRCA1 negative     neg results    Breast lump     last assessed - [id number]    Chest tightness or pressure     Resolved - [id number]    DVT (deep vein thrombosis) in pregnancy     Shoulder pain, left     last assessed - [id number]        Review of Systems   Respiratory: Negative  Cardiovascular: Negative  Gastrointestinal: Negative for constipation and diarrhea  Genitourinary: Negative for difficulty urinating, pelvic pain, vaginal bleeding, vaginal discharge, itching or odor  O:  Blood pressure 102/64, height 5' 6 54" (1 69 m), weight 80 5 kg (177 lb 6 4 oz), not currently breastfeeding  Patient appears well and is not in distress  Neck is supple without masses  Breasts are symmetrical without mass, tenderness, nipple discharge, skin changes or adenopathy  Abdomen is soft and nontender without masses  External genitals are normal without lesions or rashes  Urethral meatus and urethra are normal  Bladder is normal to palpation  Vagina is normal without discharge or bleeding  Cervix is normal without discharge or lesion  Uterus is normal, mobile, nontender without palpable mass  Adnexa are normal, nontender, without palpable mass  A:   Yearly exam     Osteoporosis     P:   Pap 2023   Mammo slip given   Colonoscopy due, referral placed   DEXA per PCP  Continue Actonel, calcium vitamin D      RTO one year for yearly exam or sooner as needed

## 2021-03-30 ENCOUNTER — TELEPHONE (OUTPATIENT)
Dept: GASTROENTEROLOGY | Facility: AMBULARY SURGERY CENTER | Age: 61
End: 2021-03-30

## 2021-03-30 NOTE — TELEPHONE ENCOUNTER
03/30/21  Screened by: Jana Brewer    Referring Provider     Pre- Screening: There is no height or weight on file to calculate BMI  Has patient been referred for a routine screening Colonoscopy? yes  Is the patient between 39-70 years old? yes      Previous Colonoscopy yes   If yes:    Date: [address]:     Reason: SCREENING      SCHEDULING STAFF: If the patient is between 45yrs-49yrs, please advise patient to confirm benefits/coverage with their insurance company for a routine screening colonoscopy, some insurance carriers will only cover at [facility] or older  If the patient is over 66years old, please schedule an office visit  Does the patient want to see a Gastroenterologist prior to their procedure OR are they having any GI symptoms? no    Has the patient been hospitalized or had abdominal surgery in the past 6 months? no    Does the patient use supplemental oxygen? no    Does the patient take Coumadin, Lovenox, Plavix, Elliquis, Xarelto, or other blood thinning medication? no    Has the patient had a stroke, cardiac event, or stent placed in the past year? no      SCHEDULING STAFF: If patient answers NO to above questions, then schedule procedure  If patient answers YES to above questions, then schedule office appointment  PASSED OA    If patient is between 45yrs - 49yrs, please advise patient that we will have to confirm benefits & coverage with their insurance company for a routine screening colonoscopy

## 2021-04-09 ENCOUNTER — PREP FOR PROCEDURE (OUTPATIENT)
Dept: GASTROENTEROLOGY | Facility: CLINIC | Age: 61
End: 2021-04-09

## 2021-04-09 DIAGNOSIS — Z12.11 COLON CANCER SCREENING: Primary | ICD-10-CM

## 2021-04-09 NOTE — PROGRESS NOTES
ASSESSMENT/PLAN:       Irregular heartbeat  In 2020 patient's 48 hour Holter was negative  Her symptomatic times showed normal sinus rhythm  We will try treating patient this time with sertraline to see if this feeling goes away  25 mg for 8 days and 50 mg  We will check her back in 6 weeks, and this can be virtual     Any problems patient will call prior          Health Maintenance   Topic Date Due    Hepatitis C Screening  Never done    HIV Screening  Never done    Colorectal Cancer Screening  2020    MAMMOGRAM  2021    Annual Physical  2021    Cervical Cancer Screening  2022    Depression Screening PHQ  2022    BMI: Followup Plan  2022    BMI: Adult  03/10/2022    DTaP,Tdap,and Td Vaccines (4 - Td) 10/01/2022    DXA SCAN  2023    Influenza Vaccine  Completed    Pneumococcal Vaccine: Pediatrics (0 to 5 Years) and At-Risk Patients (6 to 59 Years)  Aged Out    HIB Vaccine  Aged Out    Hepatitis B Vaccine  Aged Out    IPV Vaccine  Aged Out    Hepatitis A Vaccine  Aged Out    Meningococcal ACWY Vaccine  Aged Out    HPV Vaccine  Aged Out         Problem List as of 2021 Reviewed: 3/5/2021 12:06 PM by Luci Alva,     BMI 27 0-27 9,adult    Hypercholesterolemia    Postmenopause atrophic vaginitis            Subjective:   No chief complaint on file  HPI    patient ID: Mike Fields is a 63 y o  female      Past Medical History:   Diagnosis Date    Actinic keratosis     last assessed - [id number]    Anemia     BRCA1 negative     neg results    Breast lump     last assessed - [id number]    Chest tightness or pressure     Resolved - [id number]    DVT (deep vein thrombosis) in pregnancy     Shoulder pain, left     last assessed - [id number]     Past Surgical History:   Procedure Laterality Date    BREAST BIOPSY Right     neg results     SECTION      x 2    COLONOSCOPY      Complete Colonoscopy    TONSILLECTOMY       Family History   Problem Relation Age of Onset    Anxiety disorder Mother     Breast cancer Mother         in 5    Pancreatic cancer Mother 80    Parkinsonism Father     Hypertension Father     Prostate cancer Father         age unknown    Coronary artery disease Maternal Grandfather     Breast cancer Paternal Grandmother         age unknown   Marjan Fierro Stroke Paternal Grandmother    Marjan Feirro Depression Daughter    Marjan Fierro Breast cancer Maternal Aunt         age unknown    Pancreatic cancer Maternal Aunt 80    Alcohol abuse Neg Hx     Substance Abuse Neg Hx      Social History     Tobacco Use    Smoking status: Never Smoker    Smokeless tobacco: Never Used   Substance Use Topics    Alcohol use: Yes     Comment: Drinks wine    Drug use: No     Social History     Tobacco Use   Smoking Status Never Smoker   Smokeless Tobacco Never Used        MED LIST WAS REVIEWED AND UPDATED       ROS  As per HPI  Rest of 12 point review of systems negative     Objective:      VITALS:  Wt Readings from Last 3 Encounters:   03/10/21 80 5 kg (177 lb 6 4 oz)   03/05/21 78 6 kg (173 lb 3 2 oz)   12/07/20 78 1 kg (172 lb 3 2 oz)     BP Readings from Last 3 Encounters:   03/10/21 102/64   03/05/21 122/70   12/07/20 110/72     Pulse Readings from Last 3 Encounters:   03/05/21 (!) 54   12/07/20 68   05/28/20 74     There is no height or weight on file to calculate BMI  Laboratory Results:    All pertinent labs and studies were reviewed with patient during this office visit  with highlights of the results contained in this notes ASSESSMENT AND PLAN section       Physical Exam

## 2021-04-09 NOTE — TELEPHONE ENCOUNTER
Colonoscopy on 5/19/21 with Dr Cata Gaxiola at [facility]  Miralax/dulcolax prep instructions gone over verbally and mailed to the patient

## 2021-04-12 DIAGNOSIS — Z23 ENCOUNTER FOR IMMUNIZATION: ICD-10-CM

## 2021-04-16 ENCOUNTER — TELEMEDICINE (OUTPATIENT)
Dept: FAMILY MEDICINE CLINIC | Facility: CLINIC | Age: 61
End: 2021-04-16
Payer: COMMERCIAL

## 2021-04-16 VITALS
DIASTOLIC BLOOD PRESSURE: 78 MMHG | HEIGHT: 67 IN | SYSTOLIC BLOOD PRESSURE: 128 MMHG | HEART RATE: 64 BPM | BODY MASS INDEX: 27.15 KG/M2 | WEIGHT: 173 LBS

## 2021-04-16 DIAGNOSIS — Z79.899 ENCOUNTER FOR LONG-TERM (CURRENT) USE OF MEDICATIONS: ICD-10-CM

## 2021-04-16 DIAGNOSIS — E78.00 HYPERCHOLESTEROLEMIA: ICD-10-CM

## 2021-04-16 DIAGNOSIS — F41.9 ANXIETY: Primary | ICD-10-CM

## 2021-04-16 PROCEDURE — 99214 OFFICE O/P EST MOD 30 MIN: CPT | Performed by: FAMILY MEDICINE

## 2021-04-16 PROCEDURE — 1036F TOBACCO NON-USER: CPT | Performed by: FAMILY MEDICINE

## 2021-04-16 PROCEDURE — 3008F BODY MASS INDEX DOCD: CPT | Performed by: FAMILY MEDICINE

## 2021-04-16 NOTE — PROGRESS NOTES
Virtual Regular Visit      Assessment/Plan:    Anxiety  Patient started using CBD oil and feels much better   She never was anxious person before so she is surprised but grateful that she is back to normal     She is going to have her colonoscopy 5/19/2021  1st COVID shot is less than a week from    She will come back in 2 months for for Shingrix shot and then 4 months for 2nd Shingrix shot    She will come back after 12/7/2021 for her physical   She will get labs done a week prior    We will send her labs and call to set up her 3 appointments, 2 for the Shingrix and 1 for her physical/recheck    Problem List Items Addressed This Visit     Anxiety - Primary    Hypercholesterolemia    Relevant Orders    Lipid panel      Other Visit Diagnoses     Encounter for long-term (current) use of medications        Relevant Orders    Comprehensive metabolic panel    CBC and differential    TSH, 3rd generation with Free T4 reflex    Vitamin D 25 hydroxy    UA (URINE) with reflex to Scope               Reason for visit is   Chief Complaint   Patient presents with    Anxiety        Encounter provider Violeta Duran DO    Provider located at [address] [place] [address]  [phone]      Recent Visits  No visits were found meeting these conditions  Showing recent visits within past 7 days and meeting all other requirements     Today's Visits  Date Type Provider Dept   04/16/21 Telemedicine Violeta Duran DO [address]   Showing today's visits and meeting all other requirements     Future Appointments  No visits were found meeting these conditions  Showing future appointments within next 150 days and meeting all other requirements        The patient was identified by name and date of birth   Dulce Terryu was informed that this is a telemedicine visit and that the visit is being conducted through FaceTime and patient was informed that this is not a secure, HIPAA-compliant platform  She agrees to proceed     My office door was closed  No one else was in the room  She acknowledged consent and understanding of privacy and security of the video platform  The patient has agreed to participate and understands they can discontinue the visit at any time  Patient is aware this is a billable service  Christie Fiugeroa is a 63 y o  female    This is a recheck to [place] patient was doing with her sertraline  I 1st asked if she was taking and she told me now   Her  uses CBD oil for his anxiety so he had her try that at 1st   She was to prize to find that it really helped her a lot   She no longer felt her heartbeat or palpitations and actually feels good and is much calmer   Her daughter and her daughter's fiance both came down with COVID in this usually would make her very nervous and upset but it did not    She wants to continue in this fashion for now since she is doing well    I agree       Past Medical History:   Diagnosis Date    Actinic keratosis     last assessed - [id number]    Anemia     BRCA1 negative     neg results    Breast lump     last assessed - [id number]    Chest tightness or pressure     Resolved - [id number]    DVT (deep vein thrombosis) in pregnancy     Shoulder pain, left     last assessed - [id number]       Past Surgical History:   Procedure Laterality Date    BREAST BIOPSY Right     neg results     SECTION      x 2    COLONOSCOPY      Complete Colonoscopy    TONSILLECTOMY         Current Outpatient Medications   Medication Sig Dispense Refill    aspirin 81 mg chewable tablet Chew 1 tablet daily      Calcium-Magnesium (RODRICK/MAG CITRATE) 250-125 MG TABS 3 daily      Glucosamine-Chondroitin 250-200 MG TABS Take 2 tablets by mouth daily      Iron-Vitamin C (IRON 100/C) 100-250 MG TABS Take 1 tablet by mouth daily      Multiple Vitamins-Minerals (WOMENS ONE DAILY) TABS Take 1 tablet by mouth daily      Omega-3 Fatty Acids (OMEGA-3 FISH OIL) 1000 MG CAPS Take 1 capsule by mouth daily      polyethylene glycol (MiraLax) 17 GM/SCOOP powder Take 17 g by mouth daily as needed      Probiotic Product (PROBIOTIC-10 PO) Take by mouth 2 (two) times a day       risedronate (ACTONEL) 35 mg tablet Take 1 tablet (35 mg total) by mouth every 7 days with water on empty stomach, nothing by mouth or lie down for next 30 minutes  12 tablet 3    zinc gluconate 50 mg tablet Take 50 mg by mouth daily       No current facility-administered medications for this visit  Allergies   Allergen Reactions    Other Allergic Rhinitis     Seasonal        Review of Systems    Rest of patient's 10 point review of systems negative other than HPI    Video Exam    Vitals:    04/16/21 0826   BP: 128/78   BP Location: Left arm   Patient Position: Sitting   Cuff Size: Standard   Pulse: 64   Weight: 78 5 kg (173 lb)   Height: 5' 6 5" (1 689 m)       Physical Exam    Physical Exam   General:  Patient is oriented to person, place, and time  The patient does not appear ill  No distress  Mental status:  Awake, reasonable, focused, relevant, smiling, no longer has for eyebrows let   Looks much lighter than usual and happy  HEENT-normocephalic atraumatic without facial weakness, no facial pallor or flushing or cyanosis  Pulmonary/Chest: Effort normal   No coughing  No signs of respiratory distress, tachypnea, conversational dyspnea or audible wheezing noted  Psychiatric:  The patient is oriented to person, place, and time  mood and affect  Behavior is normal  Thought content normal      I spent 20 minutes directly with the patient during this visit      [address] acknowledges that she has consented to an online visit or consultation   She understands that the online visit is based solely on information provided by her, and that, in the absence of a face-to-face physical evaluation by the physician, the diagnosis she receives is both limited and provisional in terms of accuracy and completeness  This is not intended to replace a full medical face-to-face evaluation by the physician  Yemi Johnson understands and accepts these terms

## 2021-04-16 NOTE — PATIENT INSTRUCTIONS
Anxiety  Patient started using CBD oil and feels much better   She never was anxious person before so she is surprised but grateful that she is back to normal     She is going to have her colonoscopy 5/19/2021  1st COVID shot is less than a week from    She will come back in 2 months for for Shingrix shot and then 4 months for 2nd Shingrix shot    She will come back after 12/7/2021 for her physical   She will get labs done a week prior    We will send her labs and call to set up her 3 appointments, 2 for the Shingrix and 1 for her physical/recheck

## 2021-04-22 ENCOUNTER — IMMUNIZATIONS (OUTPATIENT)
Dept: FAMILY MEDICINE CLINIC | Facility: HOSPITAL | Age: 61
End: 2021-04-22

## 2021-04-22 DIAGNOSIS — Z23 ENCOUNTER FOR IMMUNIZATION: Primary | ICD-10-CM

## 2021-04-22 PROCEDURE — 91300 SARS-COV-2 / COVID-19 MRNA VACCINE (PFIZER-BIONTECH) 30 MCG: CPT

## 2021-04-22 PROCEDURE — 0001A SARS-COV-2 / COVID-19 MRNA VACCINE (PFIZER-BIONTECH) 30 MCG: CPT

## 2021-05-06 NOTE — TELEPHONE ENCOUNTER
Left message on voice mail that I confirmed with her pharmacy they do not have golytely/ national shortage  Follow miralax/ dulcolax prep instructions provided on 4/9/21  Call with any questions

## 2021-05-06 NOTE — TELEPHONE ENCOUNTER
Pt calling she would rather have a bowel prep called in    Her insurance doesn't cover suprep; she would like go-lytely called in to [facility]

## 2021-05-15 ENCOUNTER — IMMUNIZATIONS (OUTPATIENT)
Dept: FAMILY MEDICINE CLINIC | Facility: HOSPITAL | Age: 61
End: 2021-05-15

## 2021-05-15 DIAGNOSIS — Z23 ENCOUNTER FOR IMMUNIZATION: Primary | ICD-10-CM

## 2021-05-15 PROCEDURE — 91300 SARS-COV-2 / COVID-19 MRNA VACCINE (PFIZER-BIONTECH) 30 MCG: CPT

## 2021-05-15 PROCEDURE — 0002A SARS-COV-2 / COVID-19 MRNA VACCINE (PFIZER-BIONTECH) 30 MCG: CPT

## 2021-05-18 ENCOUNTER — ANESTHESIA EVENT (OUTPATIENT)
Dept: GASTROENTEROLOGY | Facility: HOSPITAL | Age: 61
End: 2021-05-18

## 2021-05-19 ENCOUNTER — ANESTHESIA (OUTPATIENT)
Dept: GASTROENTEROLOGY | Facility: HOSPITAL | Age: 61
End: 2021-05-19

## 2021-05-19 ENCOUNTER — HOSPITAL ENCOUNTER (OUTPATIENT)
Dept: GASTROENTEROLOGY | Facility: HOSPITAL | Age: 61
Setting detail: OUTPATIENT SURGERY
Discharge: HOME | End: 2021-05-19
Attending: INTERNAL MEDICINE | Admitting: INTERNAL MEDICINE
Payer: COMMERCIAL

## 2021-05-19 VITALS
SYSTOLIC BLOOD PRESSURE: 106 MMHG | WEIGHT: 170 LBS | DIASTOLIC BLOOD PRESSURE: 67 MMHG | OXYGEN SATURATION: 99 % | HEIGHT: 67 IN | RESPIRATION RATE: 18 BRPM | TEMPERATURE: 97.7 F | BODY MASS INDEX: 26.68 KG/M2 | HEART RATE: 55 BPM

## 2021-05-19 DIAGNOSIS — Z12.11 COLON CANCER SCREENING: ICD-10-CM

## 2021-05-19 PROCEDURE — 88305 TISSUE EXAM BY PATHOLOGIST: CPT | Performed by: PATHOLOGY

## 2021-05-19 PROCEDURE — 45385 COLONOSCOPY W/LESION REMOVAL: CPT | Performed by: INTERNAL MEDICINE

## 2021-05-19 RX ORDER — PROPOFOL 10 MG/ML
INJECTION, EMULSION INTRAVENOUS CONTINUOUS PRN
Status: DISCONTINUED | OUTPATIENT
Start: 2021-05-19 | End: 2021-05-19

## 2021-05-19 RX ORDER — PROPOFOL 10 MG/ML
INJECTION, EMULSION INTRAVENOUS AS NEEDED
Status: DISCONTINUED | OUTPATIENT
Start: 2021-05-19 | End: 2021-05-19

## 2021-05-19 RX ORDER — SODIUM CHLORIDE 9 MG/ML
INJECTION, SOLUTION INTRAVENOUS CONTINUOUS PRN
Status: DISCONTINUED | OUTPATIENT
Start: 2021-05-19 | End: 2021-05-19

## 2021-05-19 RX ADMIN — SODIUM CHLORIDE: 0.9 INJECTION, SOLUTION INTRAVENOUS at 10:01

## 2021-05-19 RX ADMIN — PROPOFOL 140 MG: 10 INJECTION, EMULSION INTRAVENOUS at 10:25

## 2021-05-19 RX ADMIN — PROPOFOL 140 MCG/KG/MIN: 10 INJECTION, EMULSION INTRAVENOUS at 10:25

## 2021-05-19 NOTE — ANESTHESIA PREPROCEDURE EVALUATION
Procedure:  COLONOSCOPY    Relevant Problems   CARDIO   (+) Hypercholesterolemia      NEURO/PSYCH   (+) Anxiety        Physical Exam    Airway    Mallampati score: II  TM Distance: >3 FB  Neck ROM: full     Dental       Cardiovascular  Cardiovascular exam normal    Pulmonary  Pulmonary exam normal     Other Findings        Anesthesia Plan  ASA Score- 2     Anesthesia Type- IV sedation with anesthesia with ASA Monitors  Additional Monitors:   Airway Plan:           Plan Factors-    Chart reviewed  Induction- intravenous  Postoperative Plan-     Informed Consent- Anesthetic plan and risks discussed with patient  I personally reviewed this patient with the CRNA  Discussed and agreed on the Anesthesia Plan with the CRNA  Shani Camp

## 2021-05-19 NOTE — H&P
History and Physical -  [facility]  Sophia Naranjo 57 y o  female MRN: [id number]    HPI: Sophia Naranjo is a 57y o  year old female who presents for screening colonoscopy  Last colonoscopy was 11 years ago        Review of Systems    Historical Information   Past Medical History:   Diagnosis Date    Actinic keratosis     last assessed - [id number]    Anemia     BRCA1 negative     neg results    Breast lump     last assessed - [id number]    Chest tightness or pressure     Resolved - [id number]    DVT (deep vein thrombosis) in pregnancy     Shoulder pain, left     last assessed - [id number]     Past Surgical History:   Procedure Laterality Date    BREAST BIOPSY Right     neg results     SECTION      x 2    COLONOSCOPY      Complete Colonoscopy    TONSILLECTOMY       Social History   Social History     Substance and Sexual Activity   Alcohol Use Yes    Comment: Drinks wine     Social History     Substance and Sexual Activity   Drug Use No     Social History     Tobacco Use   Smoking Status Never Smoker   Smokeless Tobacco Never Used     Family History   Problem Relation Age of Onset    Anxiety disorder Mother     Breast cancer Mother         in     Pancreatic cancer Mother 80    Parkinsonism Father     Hypertension Father     Prostate cancer Father         age unknown    Coronary artery disease Maternal Grandfather     Breast cancer Paternal Grandmother         age unknown   Delcie Ravindra Stroke Paternal Grandmother    Delcie Ravindra Depression Daughter    Delcie Ravindra Breast cancer Maternal Aunt         age unknown    Pancreatic cancer Maternal Aunt 80    Alcohol abuse Neg Hx     Substance Abuse Neg Hx        Meds/Allergies     (Not in a hospital admission)      Allergies   Allergen Reactions    Other Allergic Rhinitis     Seasonal        Objective     /64   Pulse 66   Temp 98 1 °F (36 7 °C) (Tympanic)   Resp 18   Ht 5' 6 5" (1 689 m)   Wt 77 1 kg (170 lb)   SpO2 100%   BMI 27 03 kg/m²       PHYSICAL EXAM    Gen: NAD  CV: RRR  CHEST: Clear  ABD: soft, NT/ND  EXT: no edema  Neuro: AAO      ASSESSMENT/PLAN:  This is a 57y o  year old female here for screening colonoscopy  Last colonoscopy 11 years ago      PLAN:   Procedure:  Colonoscopy with biopsy and possible polypectomy

## 2021-05-19 NOTE — ANESTHESIA POSTPROCEDURE EVALUATION
Post-Op Assessment Note    CV Status:  Stable    Pain management: adequate     Mental Status:  Awake and lethargic   Hydration Status:  Stable   PONV Controlled:  None   Airway Patency:  Patent      Post Op Vitals Reviewed: Yes      Staff: CRNA         No complications documented      BP      Temp      Pulse     Resp      SpO2

## 2021-05-24 NOTE — RESULT ENCOUNTER NOTE
Pathology showed 1 precancerous polyp that was completely removed the other polyps were benign    Follow-up colonoscopy recommended in 3 years

## 2021-06-01 ENCOUNTER — VBI (OUTPATIENT)
Dept: ADMINISTRATIVE | Facility: OTHER | Age: 61
End: 2021-06-01

## 2021-07-14 ENCOUNTER — OFFICE VISIT (OUTPATIENT)
Dept: PODIATRY | Facility: CLINIC | Age: 61
End: 2021-07-14
Payer: COMMERCIAL

## 2021-07-14 ENCOUNTER — APPOINTMENT (OUTPATIENT)
Dept: RADIOLOGY | Facility: CLINIC | Age: 61
End: 2021-07-14
Payer: COMMERCIAL

## 2021-07-14 VITALS
DIASTOLIC BLOOD PRESSURE: 76 MMHG | HEART RATE: 50 BPM | HEIGHT: 67 IN | BODY MASS INDEX: 27.25 KG/M2 | WEIGHT: 173.6 LBS | SYSTOLIC BLOOD PRESSURE: 126 MMHG

## 2021-07-14 DIAGNOSIS — M77.32 CALCANEAL SPUR, LEFT: ICD-10-CM

## 2021-07-14 DIAGNOSIS — M67.02 SHORT ACHILLES TENDON, LEFT: ICD-10-CM

## 2021-07-14 DIAGNOSIS — M79.672 LEFT FOOT PAIN: ICD-10-CM

## 2021-07-14 DIAGNOSIS — M72.2 PLANTAR FASCIITIS: Primary | ICD-10-CM

## 2021-07-14 PROCEDURE — 99243 OFF/OP CNSLTJ NEW/EST LOW 30: CPT | Performed by: PODIATRIST

## 2021-07-14 PROCEDURE — 20550 NJX 1 TENDON SHEATH/LIGAMENT: CPT | Performed by: PODIATRIST

## 2021-07-14 PROCEDURE — 3008F BODY MASS INDEX DOCD: CPT | Performed by: PODIATRIST

## 2021-07-14 PROCEDURE — 73650 X-RAY EXAM OF HEEL: CPT

## 2021-07-14 PROCEDURE — 1036F TOBACCO NON-USER: CPT | Performed by: PODIATRIST

## 2021-07-14 NOTE — PROGRESS NOTES
PATIENT:  Paco Martinez  1960       ASSESSMENT:     1  Plantar fasciitis  Foot injection   2  Left foot pain  XR heel / calcaneus 2+ vw left   3  Calcaneal spur, left     4  Short Achilles tendon, left               PLAN:  1  Patient was counseled and educated on the condition and the diagnosis  2  X-ray was obtained and personally reviewed  The radiological findings were discussed with the patient  3  The exam and symptoms are consistent with plantar fasciitis  The diagnosis, treatment options and prognosis were discussed with the patient  4  Treatment options were discussed and the patient wished to proceed with an injection  Injection was given as in the procedure  5  Instructed supportive care, home exercise, icing, and proper footwear/ arch support  6  Patient will return in 4 weeks for re-evaluation  Foot injection     Date/Time 7/14/2021 10:06 AM     Performed by  Fish Flannery DPM     Authorized by Fish Flannery DPM      Universal Protocol   Consent: Verbal consent obtained  Risks and benefits: risks, benefits and alternatives were discussed  Consent given by: patient  Time out: Immediately prior to procedure a "time out" was called to verify the correct patient, procedure, equipment, support staff and site/side marked as required  Timeout called at: 7/14/2021 10:06 AM   Patient understanding: patient states understanding of the procedure being performed  Site marked: the operative site was marked  Patient identity confirmed: verbally with patient       Procedure Details   Procedure Notes:   Treatment options were discussed and the patient wished to proceed with an injection  A trigger point injection was given to left plantar heel using 0 5cc Kenolog 40 and 2cc 1% Lidocaine pl  Instructed supportive care, icing, and resting     Patient tolerance: Patient tolerated the procedure well with no immediate complications             Subjective:       HPI  The patient presents with chief complaint of pain on left foot  She had it for about 6 months  The symptoms presents around plantar left heel with sharp and throbbing sensation  Pain level is about 9 out of 10  The symptoms have been worse since the onset  Pain increases with walking and standing  She also has post-static dyskinesia  The patient does not recall any injury  The patient tried OTC meds, stretching, and different shoes without relieving the pain  Denied any swelling  No associated numbness or paresthesia  No significant weakness  The following portions of the patient's history were reviewed and updated as appropriate: allergies, current medications, past family history, past medical history, past social history, past surgical history and problem list   All pertinent labs and images were reviewed  Past Medical History  Past Medical History:   Diagnosis Date    Actinic keratosis     last assessed - [id number]    Anemia     BRCA1 negative     neg results    Breast lump     last assessed - [id number]    Chest tightness or pressure     Resolved - [id number]    DVT (deep vein thrombosis) in pregnancy     Shoulder pain, left     last assessed - [id number]       Past Surgical History  Past Surgical History:   Procedure Laterality Date    BREAST BIOPSY Right     neg results     SECTION      x 2    COLONOSCOPY      Complete Colonoscopy    TONSILLECTOMY          Allergies:   Other    Medications:  Current Outpatient Medications   Medication Sig Dispense Refill    aspirin 81 mg chewable tablet Chew 1 tablet daily      Calcium-Magnesium (RODRICK/MAG CITRATE) 250-125 MG TABS 3 daily      Glucosamine-Chondroitin 250-200 MG TABS Take 2 tablets by mouth daily      Iron-Vitamin C (IRON 100/C) 100-250 MG TABS Take 1 tablet by mouth daily      Multiple Vitamins-Minerals (WOMENS ONE DAILY) TABS Take 1 tablet by mouth daily      Omega-3 Fatty Acids (OMEGA-3 FISH OIL) 1000 MG CAPS Take 1 capsule by mouth daily      polyethylene glycol (MiraLax) 17 GM/SCOOP powder Take 17 g by mouth daily as needed      Probiotic Product (PROBIOTIC-10 PO) Take by mouth 2 (two) times a day       risedronate (ACTONEL) 35 mg tablet Take 1 tablet (35 mg total) by mouth every 7 days with water on empty stomach, nothing by mouth or lie down for next 30 minutes  12 tablet 3    zinc gluconate 50 mg tablet Take 50 mg by mouth daily       No current facility-administered medications for this visit  Social History:  Social History     Socioeconomic History    Marital status: /Civil Union     Spouse name: None    Number of children: None    Years of education: None    Highest education level: None   Occupational History    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   Tobacco Use    Smoking status: Never Smoker    Smokeless tobacco: Never Used   Vaping Use    Vaping Use: Never used   Substance and Sexual Activity    Alcohol use: Yes     Comment: Drinks wine    Drug use: No    Sexual activity: Yes     Partners: Male     Birth control/protection: None   Other Topics Concern    None   Social History Narrative    Activities:  Treadmill    Always uses seat belt    Caffeine use - 2 cups coffee per day    Exercise: Running    Exercise: Walking    Exercises moderately less than 3 times a week    Has smoke detectors    Weight training     Social Determinants of Health     Financial Resource Strain:     Difficulty of Paying Living Expenses:    Food Insecurity:     Worried About Running Out of Food in the Last Year:     [address] in the Last Year:    Transportation Needs:     Lack of Transportation (Medical):      Lack of Transportation (Non-Medical):    Physical Activity:     Days of Exercise per Week:     Minutes of Exercise per Session:    Stress:     Feeling of Stress :    Social Connections:     Frequency of Communication with Friends and Family:     Frequency of Social Gatherings with Friends and Family:     Attends Restorationist Services:     Active Member of Clubs or Organizations:     Attends Club or Organization Meetings:     Marital Status:    Intimate Partner Violence:     Fear of Current or Ex-Partner:     Emotionally Abused:     Physically Abused:     Sexually Abused:           Review of Systems   Constitutional: Negative for appetite change, chills and fever  HENT: Negative for sore throat  Respiratory: Negative for cough and shortness of breath  Cardiovascular: Negative for chest pain and leg swelling  Gastrointestinal: Negative for diarrhea, nausea and vomiting  Musculoskeletal: Negative for joint swelling  Skin: Negative for rash and wound  Allergic/Immunologic: Negative for immunocompromised state  Neurological: Negative for weakness and numbness  Hematological: Negative  Psychiatric/Behavioral: Negative for behavioral problems and confusion  Objective:      /76 (BP Location: Left arm, Patient Position: Sitting, Cuff Size: Standard)   Pulse (!) 50   Ht 5' 7" (1 702 m)   Wt 78 7 kg (173 lb 9 6 oz)   BMI 27 19 kg/m²          Physical Exam  Vitals reviewed  Constitutional:       General: She is not in acute distress  Appearance: Normal appearance  She is not ill-appearing  HENT:      Head: Normocephalic and atraumatic  Cardiovascular:      Rate and Rhythm: Normal rate and regular rhythm  Pulses: Normal pulses  Dorsalis pedis pulses are 2+ on the right side and 2+ on the left side  Posterior tibial pulses are 2+ on the right side and 2+ on the left side  Pulmonary:      Effort: Pulmonary effort is normal  No respiratory distress  Musculoskeletal:         General: Tenderness present  No swelling or signs of injury  Cervical back: Normal range of motion and neck supple  Right lower leg: No edema  Left lower leg: No edema  Right foot: No foot drop  Left foot: No foot drop        Comments: Focal pain left plantar and plantar medial heel  No pain on lateral compression of left heel  No edema  Tight achilles tendon with equinus left  Skin:     General: Skin is warm  Capillary Refill: Capillary refill takes less than 2 seconds  Coloration: Skin is not cyanotic or mottled  Findings: No abscess, ecchymosis, erythema, rash or wound  Nails: There is no clubbing  Neurological:      General: No focal deficit present  Mental Status: She is alert and oriented to person, place, and time  Cranial Nerves: No cranial nerve deficit  Sensory: No sensory deficit  Motor: No weakness  Coordination: Coordination normal    Psychiatric:         Mood and Affect: Mood normal          Behavior: Behavior normal          Thought Content:  Thought content normal          Judgment: Judgment normal

## 2021-07-14 NOTE — PATIENT INSTRUCTIONS
Plantar Fasciitis Exercises   AMBULATORY CARE:   What you need to know about plantar fasciitis exercises:  Plantar fasciitis exercises help stretch your plantar fascia, calf muscles, and Achilles tendon  They also help strengthen the muscles that support your heel and foot  Exercises and stretching can help prevent plantar fasciitis from getting worse or coming back  Contact your healthcare provider if:   · Your pain and swelling increase  · You develop new knee, hip, or back pain  · You have questions or concerns about your condition or care  How to do plantar fasciitis exercises:  Ask your healthcare provider when to start these exercises and how often to do them  · Slant board stretch:  Stand on a slanted board with your toes higher than your heel  Press your heel into the board  Keep your knee slightly bent  Hold this position for 1 minute  Repeat 5 times  · Heel stretch:  Stand up straight with your hands on a wall  Place your injured leg slightly behind your other leg  Keep your heels flat on the floor, lean forward, and bend both knees  Hold for 30 seconds  · Calf stretch:  Stand up straight with your hands on a wall  Step forward so that your uninjured foot is in front of your injured foot  Keep your front leg bent and your back leg straight  Gently lean forward until you feel your calf stretch  Hold for 30 seconds and then relax  · Seated plantar fascia stretch:  Sit on a firm surface, such as the floor or a mat  Extend your legs out in front of you  Raise your injured foot a few inches off the ground  Keep your leg straight  Grab the toes of your injured foot and pull them toward you  With your other hand, feel your plantar fascia  You should feel it press outward  Hold for 30 seconds  If you cannot reach your toes, loop a towel or tie around your foot  Gently pull on the towel or tie and flex your toes toward you  · Heel raises:  Stand on the injured leg   Raise your other leg off the ground  Hold onto a railing or wall for balance  Slowly rise up on the toes of your injured leg  Hold for 5 seconds  Slowly lower your heel to the ground  · Toe curls:  Place a towel on the floor  Put your foot flat on the towel  Grab the towel with your toes by curling them around the towel  Lift the towel up with your toes  · Toe taps:  Sit down and place your foot flat on the floor  Keep your heel on the floor  Point all your toes up toward the ceiling  While the 4 smaller toes are pointed up, bend your big toe down and tap it on the ground  Do 10 to 50 taps  Point all 5 toes up toward the ceiling again  This time keep your big toe pointed up and tap the 4 smaller toes on the ground  Do 10 to 50 taps each time  Follow up with your healthcare provider as directed:  Write down your questions so you remember to ask them during your visits  © Copyright [address] Information is for End User's use only and may not be sold, redistributed or otherwise used for commercial purposes  All illustrations and images included in CareNotes® are the copyrighted property of [email or web address]  or [facility] [employer]   The above information is an  only  It is not intended as medical advice for individual conditions or treatments  Talk to your doctor, nurse or pharmacist before following any medical regimen to see if it is safe and effective for you

## 2021-07-14 NOTE — LETTER
July 15, 2021     Marlon Dee [address] [id number]    Patient: Nehemias Ghotra   YOB: 1960   Date of Visit: 7/14/2021       Dear Dr Kane Mueller: Thank you for referring Teressa Topete to me for evaluation  Below are my notes for this consultation  If you have questions, please do not hesitate to call me  I look forward to following your patient along with you  Sincerely,        Archana Polk DPM        CC: No Recipients  Kathryn Billings  7/14/2021 10:09 AM  Signed                 PATIENT:  Nehemias Ghotra  1960       ASSESSMENT:     1  Plantar fasciitis  Foot injection   2  Left foot pain  XR heel / calcaneus 2+ vw left   3  Calcaneal spur, left     4  Short Achilles tendon, left               PLAN:  1  Patient was counseled and educated on the condition and the diagnosis  2  X-ray was obtained and personally reviewed  The radiological findings were discussed with the patient  3  The exam and symptoms are consistent with plantar fasciitis  The diagnosis, treatment options and prognosis were discussed with the patient  4  Treatment options were discussed and the patient wished to proceed with an injection  Injection was given as in the procedure  5  Instructed supportive care, home exercise, icing, and proper footwear/ arch support  6  Patient will return in 4 weeks for re-evaluation  Foot injection     Date/Time 7/14/2021 10:06 AM     Performed by  Archana Polk DPM     Authorized by Archana Polk DPM      Universal Protocol   Consent: Verbal consent obtained  Risks and benefits: risks, benefits and alternatives were discussed  Consent given by: patient  Time out: Immediately prior to procedure a "time out" was called to verify the correct patient, procedure, equipment, support staff and site/side marked as required    Timeout called at: 7/14/2021 10:06 AM   Patient understanding: patient states understanding of the procedure being performed  Site marked: the operative site was marked  Patient identity confirmed: verbally with patient       Procedure Details   Procedure Notes:   Treatment options were discussed and the patient wished to proceed with an injection  A trigger point injection was given to left plantar heel using 0 5cc Kenolog 40 and 2cc 1% Lidocaine pl  Instructed supportive care, icing, and resting  Patient tolerance: Patient tolerated the procedure well with no immediate complications             Subjective:       HPI  The patient presents with chief complaint of pain on left foot  She had it for about 6 months  The symptoms presents around plantar left heel with sharp and throbbing sensation  Pain level is about 9 out of 10  The symptoms have been worse since the onset  Pain increases with walking and standing  She also has post-static dyskinesia  The patient does not recall any injury  The patient tried OTC meds, stretching, and different shoes without relieving the pain  Denied any swelling  No associated numbness or paresthesia  No significant weakness  The following portions of the patient's history were reviewed and updated as appropriate: allergies, current medications, past family history, past medical history, past social history, past surgical history and problem list   All pertinent labs and images were reviewed        Past Medical History  Past Medical History:   Diagnosis Date    Actinic keratosis     last assessed - [id number]    Anemia     BRCA1 negative     neg results    Breast lump     last assessed - [id number]    Chest tightness or pressure     Resolved - [id number]    DVT (deep vein thrombosis) in pregnancy     Shoulder pain, left     last assessed - [id number]       Past Surgical History  Past Surgical History:   Procedure Laterality Date    BREAST BIOPSY Right     neg results     SECTION      x 2    COLONOSCOPY      Complete Colonoscopy    TONSILLECTOMY Allergies: Other    Medications:  Current Outpatient Medications   Medication Sig Dispense Refill    aspirin 81 mg chewable tablet Chew 1 tablet daily      Calcium-Magnesium (RODRICK/MAG CITRATE) 250-125 MG TABS 3 daily      Glucosamine-Chondroitin 250-200 MG TABS Take 2 tablets by mouth daily      Iron-Vitamin C (IRON 100/C) 100-250 MG TABS Take 1 tablet by mouth daily      Multiple Vitamins-Minerals (WOMENS ONE DAILY) TABS Take 1 tablet by mouth daily      Omega-3 Fatty Acids (OMEGA-3 FISH OIL) 1000 MG CAPS Take 1 capsule by mouth daily      polyethylene glycol (MiraLax) 17 GM/SCOOP powder Take 17 g by mouth daily as needed      Probiotic Product (PROBIOTIC-10 PO) Take by mouth 2 (two) times a day       risedronate (ACTONEL) 35 mg tablet Take 1 tablet (35 mg total) by mouth every 7 days with water on empty stomach, nothing by mouth or lie down for next 30 minutes  12 tablet 3    zinc gluconate 50 mg tablet Take 50 mg by mouth daily       No current facility-administered medications for this visit  Social History:  Social History     Socioeconomic History    Marital status: /Civil Union     Spouse name: None    Number of children: None    Years of education: None    Highest education level: None   Occupational History    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   Tobacco Use    Smoking status: Never Smoker    Smokeless tobacco: Never Used   Vaping Use    Vaping Use: Never used   Substance and Sexual Activity    Alcohol use: Yes     Comment: Drinks wine    Drug use: No    Sexual activity: Yes     Partners: Male     Birth control/protection: None   Other Topics Concern    None   Social History Narrative    Activities:  Treadmill    Always uses seat belt    Caffeine use - 2 cups coffee per day    Exercise: Running    Exercise: Walking    Exercises moderately less than 3 times a week    Has smoke detectors    [facility] training     Social Determinants of Health     Financial Resource Strain:    Anali Loomis Difficulty of Paying Living Expenses:    Food Insecurity:     Worried About Running Out of Food in the Last Year:     [address] in the Last Year:    Transportation Needs:     Lack of Transportation (Medical):  Lack of Transportation (Non-Medical):    Physical Activity:     Days of Exercise per Week:     Minutes of Exercise per Session:    Stress:     Feeling of Stress :    Social Connections:     Frequency of Communication with Friends and Family:     Frequency of Social Gatherings with Friends and Family:     Attends Cheondoism Services:     Active Member of Clubs or Organizations:     Attends Club or Organization Meetings:     Marital Status:    Intimate Partner Violence:     Fear of Current or Ex-Partner:     Emotionally Abused:     Physically Abused:     Sexually Abused:           Review of Systems   Constitutional: Negative for appetite change, chills and fever  HENT: Negative for sore throat  Respiratory: Negative for cough and shortness of breath  Cardiovascular: Negative for chest pain and leg swelling  Gastrointestinal: Negative for diarrhea, nausea and vomiting  Musculoskeletal: Negative for joint swelling  Skin: Negative for rash and wound  Allergic/Immunologic: Negative for immunocompromised state  Neurological: Negative for weakness and numbness  Hematological: Negative  Psychiatric/Behavioral: Negative for behavioral problems and confusion  Objective:      /76 (BP Location: Left arm, Patient Position: Sitting, Cuff Size: Standard)   Pulse (!) 50   Ht 5' 7" (1 702 m)   Wt 78 7 kg (173 lb 9 6 oz)   BMI 27 19 kg/m²          Physical Exam  Vitals reviewed  Constitutional:       General: She is not in acute distress  Appearance: Normal appearance  She is not ill-appearing  HENT:      Head: Normocephalic and atraumatic  Cardiovascular:      Rate and Rhythm: Normal rate and regular rhythm  Pulses: Normal pulses  Dorsalis pedis pulses are 2+ on the right side and 2+ on the left side  Posterior tibial pulses are 2+ on the right side and 2+ on the left side  Pulmonary:      Effort: Pulmonary effort is normal  No respiratory distress  Musculoskeletal:         General: Tenderness present  No swelling or signs of injury  Cervical back: Normal range of motion and neck supple  Right lower leg: No edema  Left lower leg: No edema  Right foot: No foot drop  Left foot: No foot drop  Comments: Focal pain left plantar and plantar medial heel  No pain on lateral compression of left heel  No edema  Tight achilles tendon with equinus left  Skin:     General: Skin is warm  Capillary Refill: Capillary refill takes less than 2 seconds  Coloration: Skin is not cyanotic or mottled  Findings: No abscess, ecchymosis, erythema, rash or wound  Nails: There is no clubbing  Neurological:      General: No focal deficit present  Mental Status: She is alert and oriented to person, place, and time  Cranial Nerves: No cranial nerve deficit  Sensory: No sensory deficit  Motor: No weakness  Coordination: Coordination normal    Psychiatric:         Mood and Affect: Mood normal          Behavior: Behavior normal          Thought Content:  Thought content normal          Judgment: Judgment normal

## 2021-08-11 ENCOUNTER — OFFICE VISIT (OUTPATIENT)
Dept: PODIATRY | Facility: CLINIC | Age: 61
End: 2021-08-11
Payer: COMMERCIAL

## 2021-08-11 VITALS
BODY MASS INDEX: 27.31 KG/M2 | HEIGHT: 67 IN | HEART RATE: 59 BPM | SYSTOLIC BLOOD PRESSURE: 115 MMHG | WEIGHT: 174 LBS | DIASTOLIC BLOOD PRESSURE: 73 MMHG

## 2021-08-11 DIAGNOSIS — M72.2 PLANTAR FASCIITIS: Primary | ICD-10-CM

## 2021-08-11 DIAGNOSIS — M77.32 CALCANEAL SPUR, LEFT: ICD-10-CM

## 2021-08-11 PROCEDURE — 1036F TOBACCO NON-USER: CPT | Performed by: PODIATRIST

## 2021-08-11 PROCEDURE — 3008F BODY MASS INDEX DOCD: CPT | Performed by: PODIATRIST

## 2021-08-11 PROCEDURE — 99213 OFFICE O/P EST LOW 20 MIN: CPT | Performed by: PODIATRIST

## 2021-08-11 NOTE — PROGRESS NOTES
PATIENT:  Bree Bunch  1960       ASSESSMENT:     1  Plantar fasciitis     2  Calcaneal spur, left             PLAN:  1  Patient was counseled and educated on the condition and the diagnosis  2  Reviewed the last office note  She responds to the treatment well  3  Hold further injection  Instructed to continue supportive care, home exercise, icing, and proper footwear/ arch support  4  Discussed the stage of soft tissue healing  Okay to increase activity in 3-4 weeks  Discussed proper amount of activity  Subjective:     HPI  The patient presents for follow-up on left heel pain  Her pain is minimal after the injection  No edema  No pain in the morning  No associated numbness or paresthesia  No significant weakness  The following portions of the patient's history were reviewed and updated as appropriate: allergies, current medications, past family history, past medical history, past social history, past surgical history and problem list   All pertinent labs and images were reviewed  Past Medical History  Past Medical History:   Diagnosis Date    Actinic keratosis     last assessed - [id number]    Anemia     BRCA1 negative     neg results    Breast lump     last assessed - [id number]    Chest tightness or pressure     Resolved - [id number]    DVT (deep vein thrombosis) in pregnancy     Shoulder pain, left     last assessed - [id number]       Past Surgical History  Past Surgical History:   Procedure Laterality Date    BREAST BIOPSY Right     neg results     SECTION      x 2    COLONOSCOPY      Complete Colonoscopy    TONSILLECTOMY          Allergies:   Other    Medications:  Current Outpatient Medications   Medication Sig Dispense Refill    aspirin 81 mg chewable tablet Chew 1 tablet daily      Calcium-Magnesium (RODRICK/MAG CITRATE) 250-125 MG TABS 3 daily      Glucosamine-Chondroitin 250-200 MG TABS Take 2 tablets by mouth daily      Iron-Vitamin C (IRON 100/C) 100-250 MG TABS Take 1 tablet by mouth daily      Multiple Vitamins-Minerals (WOMENS ONE DAILY) TABS Take 1 tablet by mouth daily      Omega-3 Fatty Acids (OMEGA-3 FISH OIL) 1000 MG CAPS Take 1 capsule by mouth daily      polyethylene glycol (MiraLax) 17 GM/SCOOP powder Take 17 g by mouth daily as needed      Probiotic Product (PROBIOTIC-10 PO) Take by mouth 2 (two) times a day       risedronate (ACTONEL) 35 mg tablet Take 1 tablet (35 mg total) by mouth every 7 days with water on empty stomach, nothing by mouth or lie down for next 30 minutes  12 tablet 3    zinc gluconate 50 mg tablet Take 50 mg by mouth daily       No current facility-administered medications for this visit  Social History:  Social History     Socioeconomic History    Marital status: /Civil Union     Spouse name: None    Number of children: None    Years of education: None    Highest education level: None   Occupational History    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   Tobacco Use    Smoking status: Never Smoker    Smokeless tobacco: Never Used   Vaping Use    Vaping Use: Never used   Substance and Sexual Activity    Alcohol use: Yes     Comment: Drinks wine    Drug use: No    Sexual activity: Yes     Partners: Male     Birth control/protection: None   Other Topics Concern    None   Social History Narrative    Activities:  Treadmill    Always uses seat belt    Caffeine use - 2 cups coffee per day    Exercise: Running    Exercise: Walking    Exercises moderately less than 3 times a week    Has smoke detectors    Weight training     Social Determinants of Health     Financial Resource Strain:     Difficulty of Paying Living Expenses:    Food Insecurity:     Worried About Running Out of Food in the Last Year:     [address] in the Last Year:    Transportation Needs:     Lack of Transportation (Medical):      Lack of Transportation (Non-Medical):    Physical Activity:     Days of Exercise per Week:     Minutes of Exercise per Session:    Stress:     Feeling of Stress :    Social Connections:     Frequency of Communication with Friends and Family:     Frequency of Social Gatherings with Friends and Family:     Attends Mosque Services:     Active Member of Clubs or Organizations:     Attends Club or Organization Meetings:     Marital Status:    Intimate Partner Violence:     Fear of Current or Ex-Partner:     Emotionally Abused:     Physically Abused:     Sexually Abused:           Review of Systems   Constitutional: Negative for appetite change, chills and fever  Respiratory: Negative for cough and shortness of breath  Cardiovascular: Negative for chest pain and leg swelling  Gastrointestinal: Negative for diarrhea, nausea and vomiting  Musculoskeletal: Negative for gait problem  Neurological: Negative for weakness and numbness  Objective:      /73   Pulse 59   Ht 5' 7" (1 702 m)   Wt 78 9 kg (174 lb)   BMI 27 25 kg/m²          Physical Exam  Vitals reviewed  Constitutional:       General: She is not in acute distress  Appearance: Normal appearance  She is not ill-appearing  Cardiovascular:      Rate and Rhythm: Normal rate and regular rhythm  Pulses: Normal pulses  Dorsalis pedis pulses are 2+ on the right side and 2+ on the left side  Posterior tibial pulses are 2+ on the right side and 2+ on the left side  Pulmonary:      Effort: Pulmonary effort is normal  No respiratory distress  Musculoskeletal:         General: No swelling, tenderness or signs of injury  Right lower leg: No edema  Left lower leg: No edema  Right foot: No foot drop  Left foot: No foot drop  Comments: No pain left plantar and plantar medial heel  No Tinel sign  No edema  Tight achilles tendon with equinus left  Skin:     General: Skin is warm  Capillary Refill: Capillary refill takes less than 2 seconds  Coloration: Skin is not cyanotic or mottled  Findings: No abscess, ecchymosis, erythema, rash or wound  Nails: There is no clubbing  Neurological:      General: No focal deficit present  Mental Status: She is alert and oriented to person, place, and time  Cranial Nerves: No cranial nerve deficit  Sensory: No sensory deficit  Motor: No weakness  Coordination: Coordination normal       Gait: Gait normal    Psychiatric:         Mood and Affect: Mood normal          Behavior: Behavior normal          Thought Content:  Thought content normal          Judgment: Judgment normal

## 2021-10-17 DIAGNOSIS — M81.0 AGE-RELATED OSTEOPOROSIS WITHOUT CURRENT PATHOLOGICAL FRACTURE: ICD-10-CM

## 2021-10-18 RX ORDER — RISEDRONATE SODIUM 35 MG/1
35 TABLET, FILM COATED ORAL
Qty: 12 TABLET | Refills: 3 | Status: SHIPPED | OUTPATIENT
Start: 2021-10-18

## 2021-11-15 ENCOUNTER — HOSPITAL ENCOUNTER (OUTPATIENT)
Dept: MAMMOGRAPHY | Facility: IMAGING CENTER | Age: 61
Discharge: HOME | End: 2021-11-15
Payer: COMMERCIAL

## 2021-11-15 VITALS — BODY MASS INDEX: 27.47 KG/M2 | WEIGHT: 175 LBS | HEIGHT: 67 IN

## 2021-11-15 DIAGNOSIS — Z12.31 ENCOUNTER FOR SCREENING MAMMOGRAM FOR MALIGNANT NEOPLASM OF BREAST: ICD-10-CM

## 2021-11-15 PROCEDURE — 77063 BREAST TOMOSYNTHESIS BI: CPT

## 2021-11-15 PROCEDURE — 77067 SCR MAMMO BI INCL CAD: CPT

## 2022-01-20 LAB
25(OH)D3 SERPL-MCNC: 71 NG/ML (ref 30–100)
ALBUMIN SERPL-MCNC: 4.4 G/DL (ref 3.6–5.1)
ALBUMIN/GLOB SERPL: 1.5 (CALC) (ref 1–2.5)
ALP SERPL-CCNC: 45 U/L (ref 37–153)
ALT SERPL-CCNC: 16 U/L (ref 6–29)
APPEARANCE UR: ABNORMAL
AST SERPL-CCNC: 20 U/L (ref 10–35)
BACTERIA UR QL AUTO: ABNORMAL /HPF
BASOPHILS # BLD AUTO: 72 CELLS/UL (ref 0–200)
BASOPHILS NFR BLD AUTO: 1.1 %
BILIRUB SERPL-MCNC: 0.6 MG/DL (ref 0.2–1.2)
BILIRUB UR QL STRIP: NEGATIVE
BUN SERPL-MCNC: 19 MG/DL (ref 7–25)
BUN/CREAT SERPL: NORMAL (CALC) (ref 6–22)
CALCIUM SERPL-MCNC: 9.4 MG/DL (ref 8.6–10.4)
CHLORIDE SERPL-SCNC: 100 MMOL/L (ref 98–110)
CHOLEST SERPL-MCNC: 275 MG/DL
CHOLEST/HDLC SERPL: 3.2 (CALC)
CO2 SERPL-SCNC: 30 MMOL/L (ref 20–32)
COLOR UR: YELLOW
CREAT SERPL-MCNC: 0.98 MG/DL (ref 0.5–0.99)
EOSINOPHIL # BLD AUTO: 228 CELLS/UL (ref 15–500)
EOSINOPHIL NFR BLD AUTO: 3.5 %
ERYTHROCYTE [DISTWIDTH] IN BLOOD BY AUTOMATED COUNT: 13.3 % (ref 11–15)
GLOBULIN SER CALC-MCNC: 2.9 G/DL (CALC) (ref 1.9–3.7)
GLUCOSE SERPL-MCNC: 84 MG/DL (ref 65–99)
GLUCOSE UR QL STRIP: NEGATIVE
HCT VFR BLD AUTO: 42.8 % (ref 35–45)
HDLC SERPL-MCNC: 85 MG/DL
HGB BLD-MCNC: 14.3 G/DL (ref 11.7–15.5)
HGB UR QL STRIP: NEGATIVE
HYALINE CASTS #/AREA URNS LPF: ABNORMAL /LPF
KETONES UR QL STRIP: NEGATIVE
LDLC SERPL CALC-MCNC: 171 MG/DL (CALC)
LEUKOCYTE ESTERASE UR QL STRIP: NEGATIVE
LYMPHOCYTES # BLD AUTO: 3517 CELLS/UL (ref 850–3900)
LYMPHOCYTES NFR BLD AUTO: 54.1 %
MCH RBC QN AUTO: 30.4 PG (ref 27–33)
MCHC RBC AUTO-ENTMCNC: 33.4 G/DL (ref 32–36)
MCV RBC AUTO: 90.9 FL (ref 80–100)
MONOCYTES # BLD AUTO: 566 CELLS/UL (ref 200–950)
MONOCYTES NFR BLD AUTO: 8.7 %
NEUTROPHILS # BLD AUTO: 2119 CELLS/UL (ref 1500–7800)
NEUTROPHILS NFR BLD AUTO: 32.6 %
NITRITE UR QL STRIP: POSITIVE
NONHDLC SERPL-MCNC: 190 MG/DL (CALC)
PH UR STRIP: ABNORMAL [PH] (ref 5–8)
PLATELET # BLD AUTO: 301 THOUSAND/UL (ref 140–400)
PMV BLD REES-ECKER: 10.8 FL (ref 7.5–12.5)
POTASSIUM SERPL-SCNC: 4.4 MMOL/L (ref 3.5–5.3)
PROT SERPL-MCNC: 7.3 G/DL (ref 6.1–8.1)
PROT UR QL STRIP: NEGATIVE
RBC # BLD AUTO: 4.71 MILLION/UL (ref 3.8–5.1)
RBC #/AREA URNS HPF: ABNORMAL /HPF
SL AMB EGFR AFRICAN AMERICAN: 72 ML/MIN/1.73M2
SL AMB EGFR NON AFRICAN AMERICAN: 62 ML/MIN/1.73M2
SODIUM SERPL-SCNC: 137 MMOL/L (ref 135–146)
SP GR UR STRIP: 1.01 (ref 1–1.03)
SQUAMOUS #/AREA URNS HPF: ABNORMAL /HPF
TRIGL SERPL-MCNC: 81 MG/DL
TSH SERPL-ACNC: 2.95 MIU/L (ref 0.4–4.5)
WBC # BLD AUTO: 6.5 THOUSAND/UL (ref 3.8–10.8)
WBC #/AREA URNS HPF: ABNORMAL /HPF

## 2022-01-21 ENCOUNTER — TELEPHONE (OUTPATIENT)
Dept: FAMILY MEDICINE CLINIC | Facility: CLINIC | Age: 62
End: 2022-01-21

## 2022-01-21 NOTE — TELEPHONE ENCOUNTER
----- Message from Luiz Kay DO sent at 1/21/2022  3:15 PM EST -----  Please call patient and have her schedule a 30 minute appointment for office visit and a physical   Thank youLet her know I received her labs

## 2022-01-21 NOTE — RESULT ENCOUNTER NOTE
Please call patient and have her schedule a 30 minute appointment for office visit and a physical   Thank youLet her know I received her labs

## 2022-01-25 ENCOUNTER — OFFICE VISIT (OUTPATIENT)
Dept: FAMILY MEDICINE CLINIC | Facility: CLINIC | Age: 62
End: 2022-01-25
Payer: COMMERCIAL

## 2022-01-25 VITALS
HEIGHT: 67 IN | SYSTOLIC BLOOD PRESSURE: 112 MMHG | WEIGHT: 176.5 LBS | HEART RATE: 61 BPM | DIASTOLIC BLOOD PRESSURE: 74 MMHG | BODY MASS INDEX: 27.7 KG/M2 | RESPIRATION RATE: 16 BRPM

## 2022-01-25 DIAGNOSIS — F41.9 ANXIETY: ICD-10-CM

## 2022-01-25 DIAGNOSIS — R82.90 ABNORMAL URINE: ICD-10-CM

## 2022-01-25 DIAGNOSIS — E78.00 HYPERCHOLESTEROLEMIA: ICD-10-CM

## 2022-01-25 DIAGNOSIS — Z00.00 ROUTINE GENERAL MEDICAL EXAMINATION AT A HEALTH CARE FACILITY: Primary | ICD-10-CM

## 2022-01-25 PROCEDURE — 99214 OFFICE O/P EST MOD 30 MIN: CPT | Performed by: FAMILY MEDICINE

## 2022-01-25 PROCEDURE — 3725F SCREEN DEPRESSION PERFORMED: CPT | Performed by: FAMILY MEDICINE

## 2022-01-25 PROCEDURE — 99396 PREV VISIT EST AGE 40-64: CPT | Performed by: FAMILY MEDICINE

## 2022-01-25 RX ORDER — MULTIVIT-MIN/IRON/FOLIC ACID/K 18-600-40
CAPSULE ORAL DAILY
COMMUNITY

## 2022-01-25 NOTE — PROGRESS NOTES
SUBJECTIVE:-------------------------------------------------------------------------------------------    Martina Schlatter is a 57 y o   female and is here for routine health maintenance       Health Maintenance   Topic Date Due    Annual Physical  12/07/2021    Cervical Cancer Screening  03/04/2022    BMI: Followup Plan  03/05/2022    HIV Screening  01/26/2024 (Originally 5/5/1975)    Hepatitis C Screening  02/25/2024 (Originally 1960)    DTaP,Tdap,and Td Vaccines (4 - Td or Tdap) 10/01/2022    Breast Cancer Screening: Mammogram  11/15/2022    Depression Screening  01/25/2023    BMI: Adult  01/25/2023    DXA SCAN  11/04/2023    Colorectal Cancer Screening  05/19/2024    Influenza Vaccine  Completed    COVID-19 Vaccine  Completed    Pneumococcal Vaccine: Pediatrics (0 to 5 Years) and At-Risk Patients (6 to 59 Years)  Aged Out    HIB Vaccine  Aged Out    Hepatitis B Vaccine  Aged Out    IPV Vaccine  Aged Out    Hepatitis A Vaccine  Aged Out    Meningococcal ACWY Vaccine  Aged Out    HPV Vaccine  Aged [employer] History   Administered Date(s) Administered    COVID-19 PFIZER VACCINE 0 3 ML IM 04/22/2021, 05/15/2021, 11/16/2021    DTP 1960, 01/01/1964, 01/01/1965    Hep B, adult 01/01/1996    INFLUENZA 11/06/2006, 11/14/2007, 11/07/2008, 10/23/2009, 10/19/2010, 10/21/2011, 09/26/2012, 09/28/2012, 09/27/2013, 10/01/2013, 10/12/2015, 10/13/2016, 09/11/2018, 10/08/2021    IPV 01/01/1965    Influenza Quadrivalent 3 years and older 09/01/2018    Influenza Quadrivalent, 6-35 Months IM 10/12/2015, 10/13/2016, 09/14/2017    Influenza, injectable, quadrivalent, preservative free 0 5 mL 10/22/2019, 10/20/2020    Influenza, seasonal, injectable 09/26/2012, 10/01/2013    MMR 01/01/1965    Td (adult), adsorbed 10/01/2012    Tdap 01/01/1965, 10/01/2012    Tuberculin Skin Test-PPD Intradermal 04/29/2014, 04/29/2014, 05/01/2014    Zoster 05/11/2017         Diet and Physical Activity  Diet: well balanced diet but a lot of cheese and icecream  Body mass index is 27 64 kg/m²  Exercise: infrequently      General Health  Hearing:  Is normal  Vision: sees ophthalmologist/optometrist yearly  Dental:  Sees dentist every 6 months      Smoker no        ASSESSMENT/PLAN:-------------------------------------------------------------------------------------------    Patient's physical is up-to-date   Immunizations ; will consider Shingrix after COVID is done  Cancer screenings are up-to-date      Patient instructed on exercise  Patient instructed on healthy choices for diet       NEXT PHYSICAL 1 YEAR          The following portions of the patient's history were reviewed and updated as appropriate: allergies, current medications, past family history, past medical history, past social history, past surgical history and problem list       OBJECTIVE:---------------------------------------------------------------------------------------------------    /74   Pulse 61   Resp 16   Ht 5' 7" (1 702 m)   Wt 80 1 kg (176 lb 8 oz)   BMI 27 64 kg/m²   Wt Readings from Last 3 Encounters:   01/25/22 80 1 kg (176 lb 8 oz)   11/15/21 79 4 kg (175 lb)   08/11/21 78 9 kg (174 lb)     BP Readings from Last 3 Encounters:   01/25/22 112/74   08/11/21 115/73   07/14/21 126/76     Pulse Readings from Last 3 Encounters:   01/25/22 61   08/11/21 59   07/14/21 (!) 50     Body mass index is 27 64 kg/m²    Health Maintenance   Topic Date Due    Annual Physical  12/07/2021    Cervical Cancer Screening  03/04/2022    BMI: Followup Plan  03/05/2022    HIV Screening  01/26/2024 (Originally 5[birth year]/1974)    Hepatitis C Screening  02/25/2024 (Originally 1960)    DTaP,Tdap,and Td Vaccines (4 - Td or Tdap) 10/01/2022    Breast Cancer Screening: Mammogram  11/15/2022    Depression Screening  01/25/2023    BMI: Adult  01/25/2023    DXA SCAN  11/04/2023    Colorectal Cancer Screening  05/19/2024    Influenza Vaccine Completed    COVID-19 Vaccine  Completed    Pneumococcal Vaccine: Pediatrics (0 to 5 Years) and At-Risk Patients (6 to 59 Years)  Aged Out    HIB Vaccine  Aged Out    Hepatitis B Vaccine  Aged Out    IPV Vaccine  Aged Out    Hepatitis A Vaccine  Aged Out    Meningococcal ACWY Vaccine  Aged Out    HPV Vaccine  Aged Out       ROS:   12 point review of systems negative            PHYSICAL EXAM:    Gen  No acute distress well-appearing well-nourished appears stated age    Mental status  Good judgment and insight oriented to time person and place, recent and remote memory intact mood and affect normal cooperative and patient is reasonable    HEENT  PERRLA 3 mm, EOMI without nystagmus, TMs clear, turbinates open pink no exudate, pharynx benign, tongue midline    Neck   supple no masses trachea midline positive click normal carotid upstrokes with no bruits    Cor  Regular rhythm without ectopy or murmur, no S3-S4, normal palpation that is no heave lift or thrill    Vascular  No edema, good pedal pulses    Lungs  CTA bilaterally in no respiratory distress no wheezes rhonchi or rales, normal to palpation no tactile fremitus    Abdomen  Soft, no palpable masses, no hepatosplenomegaly, normal bowel sounds, nontender    Lymphatics  No palpable nodes in the neck, supraclavicular area, axilla, or groin     Musculoskeletal  No clubbing cyanosis or edema muscle tone normal 12 point review of systems is negative    Skin  no rashes or abnormal appearing lesions    Neuro  Normal ambulation, cranial nerves 2-12 grossly intact, higher functioning with reasoning intact

## 2022-01-25 NOTE — PATIENT INSTRUCTIONS
Recheck in 1 year sooner if needed  We will call with the plan regarding the calcium score    Consider the shingles vaccine once things slow down  You can call the office and schedule that

## 2022-01-25 NOTE — PROGRESS NOTES
Assessment/Plan:    Abnormal urine  Patient will leave a new urine for a UA and culture and will we will call her only if there is something we need to do to treated  Patient is only mildly symptomatic and this was a random urine    Hypercholesterolemia  Cholesterol 275 from 235   from 141  In order to help decide whether we should treat her cholesterol are not patient might consider getting coronary calcium score  Order was given to her  She still at low risk with a cardiovascular score of only 3%  We usually do not start training cholesterol until it 7 and 0 5% but it will go up with age    Osteoporosis  Patient is on Actonel once a week along with her calcium and vitamin-D  DEXA is up-to-date    Anxiety  Doing well with CBD    Recheck in 1 year sooner if needed  We will call with the plan regarding the calcium score    Consider Shingrix when things calm down        BMI Counseling: Body mass index is 27 64 kg/m²  The BMI is above normal  Nutrition recommendations include decreasing portion sizes and encouraging healthy choices of fruits and vegetables  Exercise recommendations include exercising 3-5 times per week  Rationale for BMI follow-up plan is due to patient being overweight or obese  Depression Screening and Follow-up Plan: Patient was screened for depression during today's encounter  They screened negative with a PHQ-2 score of 0  Subjective:   Elizabeth Leyva is a 57 y  o female  Chief Complaint   Patient presents with    Physical Exam     Patient here for her physical in her recheck  Since last visit she had her plantar fasciitis injected and did start feeling well  She is following with Podiatry    She also had her colonoscopy that will be repeated in 3 years    Her anxiety is totally being treated with CBD and she has had no problems her breakthroughs    Her cholesterol is up and she blames that for good he is from [place] time as well as ice-cream       Past medical history, social history, and family history reviewed as appropriate for the complaint of this patient  MEDICATIONS REVIEWED AND UPDATED    10 point review of systems performed, the remainder of the ROS is negative except for what is noted in the history of chief complaint    Objective:    Vitals:    01/25/22 1526   BP: 112/74   Pulse: 61   Resp: 16     Body mass index is 27 64 kg/m²  Physical Exam      Gen  No acute distress well-appearing well-nourished appears stated age    Mental status  Good judgment and insight oriented to time person and place, recent and remote memory intact mood and affect normal cooperative and patient is reasonable    HEENT  PERRLA 3 mm, EOMI without nystagmus, normocephalic atraumatic without facial weakness      Neck   supple no masses trachea midline positive click normal carotid upstrokes with no bruits    Cor  Regular rhythm without ectopy or murmur, no S3-S4, normal palpation that is no heave lift or thrill    Vascular  No edema, good pedal pulses    Lungs  CTA bilaterally in no respiratory distress no wheezes rhonchi or rales, normal to palpation no tactile fremitus    Abdomen  Soft, no palpable masses, no hepatosplenomegaly, normal bowel sounds, nontender    Lymphatics  No palpable nodes in the neck, supraclavicular area, axilla, or groin     Musculoskeletal  No clubbing cyanosis or edema muscle tone normal    Skin  no rashes or abnormal appearing lesions    Neuro  Normal ambulation, cranial nerves 2-12 grossly intact, higher functioning with reasoning intact

## 2022-01-26 ENCOUNTER — OFFICE VISIT (OUTPATIENT)
Dept: PODIATRY | Facility: CLINIC | Age: 62
End: 2022-01-26
Payer: COMMERCIAL

## 2022-01-26 VITALS
HEART RATE: 48 BPM | DIASTOLIC BLOOD PRESSURE: 75 MMHG | WEIGHT: 176 LBS | SYSTOLIC BLOOD PRESSURE: 115 MMHG | HEIGHT: 67 IN | BODY MASS INDEX: 27.62 KG/M2

## 2022-01-26 DIAGNOSIS — M77.52 CAPSULITIS OF METATARSOPHALANGEAL (MTP) JOINT OF LEFT FOOT: Primary | ICD-10-CM

## 2022-01-26 PROCEDURE — 3008F BODY MASS INDEX DOCD: CPT | Performed by: PODIATRIST

## 2022-01-26 PROCEDURE — 99212 OFFICE O/P EST SF 10 MIN: CPT | Performed by: PODIATRIST

## 2022-01-26 PROCEDURE — 1036F TOBACCO NON-USER: CPT | Performed by: PODIATRIST

## 2022-01-26 NOTE — PROGRESS NOTES
PATIENT:  Debra Corral  1960       ASSESSMENT:     1  Capsulitis of metatarsophalangeal (MTP) joint of left foot             PLAN:  1  Patient was counseled and educated on the condition and the diagnosis  2  She had pain around metatarsals for 4-5 weeks  Pain resolved at this time  Possible capsulitis vs stress fracture vs extensor tendinitis  3  Instructed supportive care and proper footwear  Pt to call the office for any increased symptoms  Subjective:     HPI  The patient presents for left foot evaluation  She started aches and pain about 6 weeks ago  It was around toe joints left foot  She also noted dilated veins at that time  Pain was about 5  Then, pain and her symptom resolved 1 5 weeks ago  The following portions of the patient's history were reviewed and updated as appropriate: allergies, current medications, past family history, past medical history, past social history, past surgical history and problem list   All pertinent labs and images were reviewed  Past Medical History  Past Medical History:   Diagnosis Date    Actinic keratosis     last assessed - [id number]    Anemia     BRCA1 negative     neg results    Breast lump     last assessed - [id number]    Chest tightness or pressure     Resolved - [id number]    DVT (deep vein thrombosis) in pregnancy     Shoulder pain, left     last assessed - [id number]       Past Surgical History  Past Surgical History:   Procedure Laterality Date    BREAST BIOPSY Right     neg results     SECTION      x 2    COLONOSCOPY      Complete Colonoscopy    TONSILLECTOMY          Allergies:   Other    Medications:  Current Outpatient Medications   Medication Sig Dispense Refill    Ascorbic Acid (Vitamin C) 500 MG CAPS Take by mouth daily      aspirin 81 mg chewable tablet Chew 1 tablet daily      Calcium-Magnesium (RODRICK/MAG CITRATE) 250-125 MG TABS 3 daily      Glucosamine-Chondroitin 250-200 MG TABS Take 2 tablets by mouth daily      Iron-Vitamin C (IRON 100/C) 100-250 MG TABS Take 1 tablet by mouth daily      Multiple Vitamins-Minerals (WOMENS ONE DAILY) TABS Take 1 tablet by mouth daily      Omega-3 Fatty Acids (OMEGA-3 FISH OIL) 1000 MG CAPS Take 1 capsule by mouth daily      polyethylene glycol (MiraLax) 17 GM/SCOOP powder Take 17 g by mouth daily as needed      Probiotic Product (PROBIOTIC-10 PO) Take by mouth 2 (two) times a day       risedronate (ACTONEL) 35 mg tablet TAKE 1 TABLET (35 MG TOTAL) BY MOUTH EVERY 7 DAYS WITH WATER ON EMPTY STOMACH, NOTHING BY MOUTH OR LIE DOWN FOR NEXT 30 MINUTES  12 tablet 3    zinc gluconate 50 mg tablet Take 50 mg by mouth daily       No current facility-administered medications for this visit  Social History:  Social History     Socioeconomic History    Marital status: /Civil Union     Spouse name: None    Number of children: None    Years of education: None    Highest education level: None   Occupational History    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   Tobacco Use    Smoking status: Never Smoker    Smokeless tobacco: Never Used   Vaping Use    Vaping Use: Never used   Substance and Sexual Activity    Alcohol use: Yes     Comment: Drinks wine    Drug use: No    Sexual activity: Yes     Partners: Male     Birth control/protection: None   Other Topics Concern    None   Social History Narrative    Activities:  Treadmill    Always uses seat belt    Caffeine use - 2 cups coffee per day    Exercise: Running    Exercise: Walking    Exercises moderately less than 3 times a week    Has smoke detectors    Weight training     Social Determinants of Health     Financial Resource Strain: Not on file   Food Insecurity: Not on file   Transportation Needs: Not on file   Physical Activity: Not on file   Stress: Not on file   Social Connections: Not on file   Intimate Partner Violence: Not on file   Housing Stability: Not on file          Review of Systems Constitutional: Negative for appetite change, chills and fever  Respiratory: Negative for cough and shortness of breath  Cardiovascular: Negative for chest pain and leg swelling  Gastrointestinal: Negative for diarrhea, nausea and vomiting  Musculoskeletal: Negative for gait problem  Neurological: Negative for weakness and numbness  Objective:      /75   Pulse (!) 48   Ht 5' 7" (1 702 m) Comment: verbal  Wt 79 8 kg (176 lb)   BMI 27 57 kg/m²          Physical Exam  Vitals reviewed  Constitutional:       General: She is not in acute distress  Appearance: Normal appearance  She is not ill-appearing  Cardiovascular:      Rate and Rhythm: Normal rate and regular rhythm  Pulses: Normal pulses  Dorsalis pedis pulses are 2+ on the right side and 2+ on the left side  Posterior tibial pulses are 2+ on the right side and 2+ on the left side  Pulmonary:      Effort: Pulmonary effort is normal  No respiratory distress  Musculoskeletal:         General: No swelling, tenderness or signs of injury  Right lower leg: No edema  Left lower leg: No edema  Right foot: No foot drop  Left foot: No foot drop  Comments: No pain on palpation or ROM left foot  Skin:     General: Skin is warm  Capillary Refill: Capillary refill takes less than 2 seconds  Coloration: Skin is not cyanotic or mottled  Findings: No abscess, ecchymosis, erythema, rash or wound  Nails: There is no clubbing  Neurological:      General: No focal deficit present  Mental Status: She is alert and oriented to person, place, and time  Cranial Nerves: No cranial nerve deficit  Sensory: No sensory deficit  Motor: No weakness  Coordination: Coordination normal       Gait: Gait normal    Psychiatric:         Mood and Affect: Mood normal          Behavior: Behavior normal          Thought Content:  Thought content normal          Judgment: Judgment normal

## 2022-01-28 LAB
APPEARANCE UR: CLEAR
BACTERIA UR QL AUTO: ABNORMAL /HPF
BILIRUB UR QL STRIP: NEGATIVE
COLOR UR: YELLOW
GLUCOSE UR QL STRIP: NEGATIVE
HGB UR QL STRIP: NEGATIVE
HYALINE CASTS #/AREA URNS LPF: ABNORMAL /LPF
KETONES UR QL STRIP: NEGATIVE
LEUKOCYTE ESTERASE UR QL STRIP: ABNORMAL
NITRITE UR QL STRIP: NEGATIVE
PH UR STRIP: 5.5 [PH] (ref 5–8)
PROT UR QL STRIP: NEGATIVE
RBC #/AREA URNS HPF: ABNORMAL /HPF
SP GR UR STRIP: 1.02 (ref 1–1.03)
SQUAMOUS #/AREA URNS HPF: ABNORMAL /HPF
WBC #/AREA URNS HPF: ABNORMAL /HPF

## 2022-04-06 ENCOUNTER — ANNUAL EXAM (OUTPATIENT)
Dept: OBGYN CLINIC | Facility: CLINIC | Age: 62
End: 2022-04-06
Payer: COMMERCIAL

## 2022-04-06 VITALS
SYSTOLIC BLOOD PRESSURE: 108 MMHG | WEIGHT: 180.2 LBS | BODY MASS INDEX: 28.28 KG/M2 | HEIGHT: 67 IN | DIASTOLIC BLOOD PRESSURE: 64 MMHG

## 2022-04-06 DIAGNOSIS — Z12.31 ENCOUNTER FOR SCREENING MAMMOGRAM FOR MALIGNANT NEOPLASM OF BREAST: ICD-10-CM

## 2022-04-06 DIAGNOSIS — M81.0 OSTEOPOROSIS, UNSPECIFIED OSTEOPOROSIS TYPE, UNSPECIFIED PATHOLOGICAL FRACTURE PRESENCE: ICD-10-CM

## 2022-04-06 DIAGNOSIS — Z01.419 ENCNTR FOR GYN EXAM (GENERAL) (ROUTINE) W/O ABN FINDINGS: Primary | ICD-10-CM

## 2022-04-06 PROCEDURE — S0612 ANNUAL GYNECOLOGICAL EXAMINA: HCPCS | Performed by: PHYSICIAN ASSISTANT

## 2022-04-06 NOTE — PROGRESS NOTES
Mavis Williamson   1960    CC:  Yearly exam    S:  57 y o  female here for yearly exam  She is postmenopausal and has had no vaginal bleeding  She denies vaginal discharge, itching, odor or dryness  Sexual activity: She is sexually active without pain, bleeding  She does have some dryness that she manages with a silicone lubricant    She has a history of a DVT and is not an estrogen candidate  We did discuss Revaree suppositories  Last Pap: 2/15/18 neg/neg  Last Mammo:  11/15/21 neg  Last Colonoscopy:  5/19/21 polyps (due 2024)  Last DEXA: 11/4/20 osteoporosis (on Actonel weekly, calcium, and D through her PCP)     We reviewed ASCCP guidelines for Pap testing  Family hx of breast cancer: mother, paternal grandmother, maternal aunt  Family hx of ovarian cancer: no  Family hx of colon cancer: no    Deyanira is negative for a BRCA mutation         Current Outpatient Medications:     Ascorbic Acid (Vitamin C) 500 MG CAPS, Take by mouth daily, Disp: , Rfl:     aspirin 81 mg chewable tablet, Chew 1 tablet daily, Disp: , Rfl:     Calcium-Magnesium (RODRICK/MAG CITRATE) 250-125 MG TABS, 3 daily, Disp: , Rfl:     Glucosamine-Chondroitin 250-200 MG TABS, Take 2 tablets by mouth daily, Disp: , Rfl:     Iron-Vitamin C (IRON 100/C) 100-250 MG TABS, Take 1 tablet by mouth daily, Disp: , Rfl:     Multiple Vitamins-Minerals (WOMENS ONE DAILY) TABS, Take 1 tablet by mouth daily, Disp: , Rfl:     Omega-3 Fatty Acids (OMEGA-3 FISH OIL) 1000 MG CAPS, Take 1 capsule by mouth daily, Disp: , Rfl:     polyethylene glycol (MiraLax) 17 GM/SCOOP powder, Take 17 g by mouth daily as needed, Disp: , Rfl:     Probiotic Product (PROBIOTIC-10 PO), Take by mouth 2 (two) times a day , Disp: , Rfl:     risedronate (ACTONEL) 35 mg tablet, TAKE 1 TABLET (35 MG TOTAL) BY MOUTH EVERY 7 DAYS WITH WATER ON EMPTY STOMACH, NOTHING BY MOUTH OR LIE DOWN FOR NEXT 30 MINUTES , Disp: 12 tablet, Rfl: 3    zinc gluconate 50 mg tablet, Take 50 mg by mouth daily, Disp: , Rfl:   Social History     Socioeconomic History    Marital status: /Civil Union     Spouse name: Not on file    Number of children: Not on file    Years of education: Not on file    Highest education level: Not on file   Occupational History    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   Tobacco Use    Smoking status: Never Smoker    Smokeless tobacco: Never Used   Vaping Use    Vaping Use: Never used   Substance and Sexual Activity    Alcohol use: Yes     Comment: Drinks wine    Drug use: No    Sexual activity: Yes     Partners: Male     Birth control/protection: None   Other Topics Concern    Not on file   Social History Narrative    Activities:  Treadmill    Always uses seat belt    Caffeine use - 2 cups coffee per day    Exercise: Running    Exercise: Walking    Exercises moderately less than 3 times a week    Has smoke detectors    Weight training     Social Determinants of Health     Financial Resource Strain: Not on file   Food Insecurity: Not on file   Transportation Needs: Not on file   Physical Activity: Not on file   Stress: Not on file   Social Connections: Not on file   Intimate Partner Violence: Not on file   Housing Stability: Not on file     Family History   Problem Relation Age of Onset    Anxiety disorder Mother     Breast cancer Mother         in 2000   [facility] Pancreatic cancer Mother 80    Parkinsonism Father     Hypertension Father     Prostate cancer Father         age unknown    No Known Problems Maternal Grandmother     Coronary artery disease Maternal Grandfather     Breast cancer Paternal Grandmother         age unknown    Stroke Paternal Grandmother     Depression Daughter     Breast cancer Maternal Aunt         age unknown    Pancreatic cancer Maternal Aunt 80    No Known Problems Paternal Grandfather     Alcohol abuse Neg Hx     Substance Abuse Neg Hx      Past Medical History:   Diagnosis Date    Actinic keratosis     last assessed - [id number]    Anemia     BRCA1 negative     neg results    Breast lump     last assessed - [id number]    Chest tightness or pressure     Resolved - [id number]    DVT (deep vein thrombosis) in pregnancy     Shoulder pain, left     last assessed - [id number]        Review of Systems   Respiratory: Negative  Cardiovascular: Negative  Gastrointestinal: Negative for constipation and diarrhea  Genitourinary: Negative for difficulty urinating, pelvic pain, vaginal bleeding, vaginal discharge, itching or odor  O:  Blood pressure 108/64, height 5' 6 54" (1 69 m), weight 81 7 kg (180 lb 3 2 oz), not currently breastfeeding  Patient appears well and is not in distress  Neck is supple without masses  Breasts are symmetrical without mass, tenderness, nipple discharge, skin changes or adenopathy  Abdomen is soft and nontender without masses  External genitals are normal without lesions or rashes  Urethral meatus and urethra are normal  Bladder is normal to palpation  Vagina is normal without discharge or bleeding  Cervix is normal without discharge or lesion  Uterus is normal, mobile, nontender without palpable mass  Adnexa are normal, nontender, without palpable mass  A:   Yearly exam     Osteoporosis   Atrophic vaginitis    P:   Pap 2023  Mammo slip given   Colonoscopy due   DEXA per PCP     RTO one year for yearly exam or sooner as needed

## 2022-06-04 ENCOUNTER — HOSPITAL ENCOUNTER (OUTPATIENT)
Dept: RADIOLOGY | Facility: HOSPITAL | Age: 62
Discharge: HOME | End: 2022-06-04

## 2022-06-04 DIAGNOSIS — E78.00 HYPERCHOLESTEROLEMIA: ICD-10-CM

## 2022-06-04 PROCEDURE — G1004 CDSM NDSC: HCPCS

## 2022-06-04 PROCEDURE — 75571 CT HRT W/O DYE W/CA TEST: CPT

## 2022-06-09 NOTE — RESULT ENCOUNTER NOTE
Call patient regarding her coronary artery score  It is 13 which is nice and low although 60% of people her age have box score of 0 which means she is a little higher than her age bracket  We can discuss statins which we may or may not use in the future next time she comes in   In any case she should really watch her cheese and ice cream and cut fatty foods down

## 2022-06-10 ENCOUNTER — TELEPHONE (OUTPATIENT)
Dept: FAMILY MEDICINE CLINIC | Facility: CLINIC | Age: 62
End: 2022-06-10

## 2022-06-10 NOTE — TELEPHONE ENCOUNTER
----- Message from Felipe Lwas DO sent at 6/9/2022  4:58 PM EDT -----  Call patient regarding her coronary artery score  It is 13 which is nice and low although 60% of people her age have box score of 0 which means she is a little higher than her age bracket  We can discuss statins which we may or may not use in the future next time she comes in   In any case she should really watch her cheese and ice cream and cut fatty foods down  Left message to call back

## 2022-07-15 ENCOUNTER — TELEPHONE (OUTPATIENT)
Dept: HEMATOLOGY ONCOLOGY | Facility: CLINIC | Age: 62
End: 2022-07-15

## 2022-07-15 NOTE — TELEPHONE ENCOUNTER
Patient calling in requesting her genetic testing results from 2017  Patient has never been a patient of hematology or surg onc      Patient was transferred to Genetic testing Dept- [phone]

## 2022-09-18 DIAGNOSIS — M81.0 AGE-RELATED OSTEOPOROSIS WITHOUT CURRENT PATHOLOGICAL FRACTURE: ICD-10-CM

## 2022-09-18 RX ORDER — RISEDRONATE SODIUM 35 MG/1
35 TABLET, FILM COATED ORAL
Qty: 12 TABLET | Refills: 0 | Status: SHIPPED | OUTPATIENT
Start: 2022-09-18 | End: 2022-12-15

## 2022-11-08 ENCOUNTER — HOSPITAL ENCOUNTER (OUTPATIENT)
Dept: BONE DENSITY | Facility: IMAGING CENTER | Age: 62
Discharge: HOME | End: 2022-11-08

## 2022-11-08 DIAGNOSIS — M81.0 OSTEOPOROSIS, UNSPECIFIED OSTEOPOROSIS TYPE, UNSPECIFIED PATHOLOGICAL FRACTURE PRESENCE: ICD-10-CM

## 2022-11-10 ENCOUNTER — HOSPITAL ENCOUNTER (OUTPATIENT)
Dept: BONE DENSITY | Facility: IMAGING CENTER | Age: 62
Discharge: HOME | End: 2022-11-10

## 2022-11-10 VITALS — BODY MASS INDEX: 27.47 KG/M2 | HEIGHT: 67 IN | WEIGHT: 175 LBS

## 2022-11-16 ENCOUNTER — HOSPITAL ENCOUNTER (OUTPATIENT)
Dept: MAMMOGRAPHY | Facility: IMAGING CENTER | Age: 62
Discharge: HOME | End: 2022-11-16

## 2022-11-16 VITALS — WEIGHT: 175.04 LBS | BODY MASS INDEX: 27.47 KG/M2 | HEIGHT: 67 IN

## 2022-11-16 DIAGNOSIS — Z12.31 ENCOUNTER FOR SCREENING MAMMOGRAM FOR MALIGNANT NEOPLASM OF BREAST: ICD-10-CM

## 2022-12-15 ENCOUNTER — TELEPHONE (OUTPATIENT)
Dept: FAMILY MEDICINE CLINIC | Facility: CLINIC | Age: 62
End: 2022-12-15

## 2022-12-15 DIAGNOSIS — M81.0 AGE-RELATED OSTEOPOROSIS WITHOUT CURRENT PATHOLOGICAL FRACTURE: ICD-10-CM

## 2022-12-15 RX ORDER — RISEDRONATE SODIUM 35 MG/1
TABLET, FILM COATED ORAL
Qty: 12 TABLET | Refills: 0 | Status: SHIPPED | OUTPATIENT
Start: 2022-12-15

## 2022-12-15 NOTE — TELEPHONE ENCOUNTER
Patient had a dexa scan on 11/8/22  It was ordered by her OBGYN, but she told them to let you know the results  Patient is due to get a refill on her Fosamax  She's wondering if you saw there results of her DEXA and if she should be on a higher dose  She has an appointment with you at the end of January next year  In the meantime, should she just fill her current script? She is going on vacation and needs to fill her script in the next few days to be able to take it with her

## 2023-01-11 ENCOUNTER — TELEPHONE (OUTPATIENT)
Dept: FAMILY MEDICINE CLINIC | Facility: CLINIC | Age: 63
End: 2023-01-11

## 2023-01-11 DIAGNOSIS — F41.9 ANXIETY: ICD-10-CM

## 2023-01-11 DIAGNOSIS — E78.00 HYPERCHOLESTEROLEMIA: Primary | ICD-10-CM

## 2023-01-11 DIAGNOSIS — Z79.899 ENCOUNTER FOR LONG-TERM (CURRENT) USE OF MEDICATIONS: ICD-10-CM

## 2023-01-11 DIAGNOSIS — E55.9 VITAMIN D DEFICIENCY: ICD-10-CM

## 2023-01-11 NOTE — TELEPHONE ENCOUNTER
Patient has an appointment on the 27th on Jan, she is asking for labs if you want her to have them done

## 2023-01-20 ENCOUNTER — RA CDI HCC (OUTPATIENT)
Dept: OTHER | Facility: HOSPITAL | Age: 63
End: 2023-01-20

## 2023-01-20 LAB
25(OH)D3 SERPL-MCNC: 71 NG/ML (ref 30–100)
ALBUMIN SERPL-MCNC: 4 G/DL (ref 3.6–5.1)
ALBUMIN/GLOB SERPL: 1.3 (CALC) (ref 1–2.5)
ALP SERPL-CCNC: 42 U/L (ref 37–153)
ALT SERPL-CCNC: 21 U/L (ref 6–29)
APPEARANCE UR: ABNORMAL
AST SERPL-CCNC: 22 U/L (ref 10–35)
BACTERIA UR QL AUTO: ABNORMAL /HPF
BASOPHILS # BLD AUTO: 67 CELLS/UL (ref 0–200)
BASOPHILS NFR BLD AUTO: 1 %
BILIRUB SERPL-MCNC: 0.6 MG/DL (ref 0.2–1.2)
BILIRUB UR QL STRIP: NEGATIVE
BUN SERPL-MCNC: 19 MG/DL (ref 7–25)
BUN/CREAT SERPL: NORMAL (CALC) (ref 6–22)
CALCIUM SERPL-MCNC: 9.2 MG/DL (ref 8.6–10.4)
CHLORIDE SERPL-SCNC: 102 MMOL/L (ref 98–110)
CHOLEST SERPL-MCNC: 278 MG/DL
CHOLEST/HDLC SERPL: 3.5 (CALC)
CO2 SERPL-SCNC: 29 MMOL/L (ref 20–32)
COLOR UR: YELLOW
CREAT SERPL-MCNC: 1.05 MG/DL (ref 0.5–1.05)
EOSINOPHIL # BLD AUTO: 261 CELLS/UL (ref 15–500)
EOSINOPHIL NFR BLD AUTO: 3.9 %
ERYTHROCYTE [DISTWIDTH] IN BLOOD BY AUTOMATED COUNT: 13.3 % (ref 11–15)
GFR/BSA.PRED SERPLBLD CYS-BASED-ARV: 60 ML/MIN/1.73M2
GLOBULIN SER CALC-MCNC: 3.1 G/DL (CALC) (ref 1.9–3.7)
GLUCOSE SERPL-MCNC: 87 MG/DL (ref 65–99)
GLUCOSE UR QL STRIP: NEGATIVE
HCT VFR BLD AUTO: 42.5 % (ref 35–45)
HDLC SERPL-MCNC: 80 MG/DL
HGB BLD-MCNC: 14.2 G/DL (ref 11.7–15.5)
HGB UR QL STRIP: NEGATIVE
HYALINE CASTS #/AREA URNS LPF: ABNORMAL /LPF
KETONES UR QL STRIP: NEGATIVE
LDLC SERPL CALC-MCNC: 178 MG/DL (CALC)
LEUKOCYTE ESTERASE UR QL STRIP: ABNORMAL
LYMPHOCYTES # BLD AUTO: 3578 CELLS/UL (ref 850–3900)
LYMPHOCYTES NFR BLD AUTO: 53.4 %
MCH RBC QN AUTO: 30.5 PG (ref 27–33)
MCHC RBC AUTO-ENTMCNC: 33.4 G/DL (ref 32–36)
MCV RBC AUTO: 91.4 FL (ref 80–100)
MONOCYTES # BLD AUTO: 637 CELLS/UL (ref 200–950)
MONOCYTES NFR BLD AUTO: 9.5 %
NEUTROPHILS # BLD AUTO: 2157 CELLS/UL (ref 1500–7800)
NEUTROPHILS NFR BLD AUTO: 32.2 %
NITRITE UR QL STRIP: POSITIVE
NONHDLC SERPL-MCNC: 198 MG/DL (CALC)
PH UR STRIP: 7.5 [PH] (ref 5–8)
PLATELET # BLD AUTO: 303 THOUSAND/UL (ref 140–400)
PMV BLD REES-ECKER: 10.3 FL (ref 7.5–12.5)
POTASSIUM SERPL-SCNC: 4.6 MMOL/L (ref 3.5–5.3)
PROT SERPL-MCNC: 7.1 G/DL (ref 6.1–8.1)
PROT UR QL STRIP: NEGATIVE
RBC # BLD AUTO: 4.65 MILLION/UL (ref 3.8–5.1)
RBC #/AREA URNS HPF: ABNORMAL /HPF
SODIUM SERPL-SCNC: 139 MMOL/L (ref 135–146)
SP GR UR STRIP: 1.02 (ref 1–1.03)
SQUAMOUS #/AREA URNS HPF: ABNORMAL /HPF
TRIGL SERPL-MCNC: 89 MG/DL
TSH SERPL-ACNC: 2.55 MIU/L (ref 0.4–4.5)
WBC # BLD AUTO: 6.7 THOUSAND/UL (ref 3.8–10.8)
WBC #/AREA URNS HPF: ABNORMAL /HPF

## 2023-01-20 NOTE — PROGRESS NOTES
[facility]  coding opportunities       Chart reviewed, no opportunity found: CHART REVIEWED, NO OPPORTUNITY FOUND        Patients Insurance        Commercial Insurance: [address] [place] [address]

## 2023-01-27 ENCOUNTER — OFFICE VISIT (OUTPATIENT)
Dept: FAMILY MEDICINE CLINIC | Facility: CLINIC | Age: 63
End: 2023-01-27

## 2023-01-27 VITALS
RESPIRATION RATE: 16 BRPM | WEIGHT: 174 LBS | OXYGEN SATURATION: 95 % | HEART RATE: 59 BPM | SYSTOLIC BLOOD PRESSURE: 112 MMHG | DIASTOLIC BLOOD PRESSURE: 70 MMHG | BODY MASS INDEX: 27.31 KG/M2 | HEIGHT: 67 IN

## 2023-01-27 DIAGNOSIS — Z23 NEED FOR VACCINATION: ICD-10-CM

## 2023-01-27 DIAGNOSIS — R10.84 GENERALIZED ABDOMINAL PAIN: ICD-10-CM

## 2023-01-27 DIAGNOSIS — F41.9 ANXIETY: ICD-10-CM

## 2023-01-27 DIAGNOSIS — Z12.31 ENCOUNTER FOR SCREENING MAMMOGRAM FOR MALIGNANT NEOPLASM OF BREAST: Primary | ICD-10-CM

## 2023-01-27 DIAGNOSIS — Z80.0 FAMILY HISTORY OF PANCREATIC CANCER: ICD-10-CM

## 2023-01-27 DIAGNOSIS — E55.9 VITAMIN D DEFICIENCY: ICD-10-CM

## 2023-01-27 DIAGNOSIS — E78.00 HYPERCHOLESTEROLEMIA: ICD-10-CM

## 2023-01-27 NOTE — PROGRESS NOTES
SUBJECTIVE:-------------------------------------------------------------------------------------------    Betzy Marquez is a 60 y o   female and is here for routine health maintenance       Health Maintenance   Topic Date Due   • Cervical Cancer Screening  03/04/2022   • DTaP,Tdap,and Td Vaccines (4 - Td or Tdap) 10/01/2022   • COVID-19 Vaccine (5 - Booster for Pfizer series) 11/14/2022   • BMI: Followup Plan  01/25/2023   • Annual Physical  01/25/2023   • HIV Screening  01/26/2024 (Originally 5/5/1975)   • Hepatitis C Screening  02/25/2024 (Originally 1960)   • BMI: Adult  11/16/2023   • Breast Cancer Screening: Mammogram  11/16/2023   • Depression Screening  01/27/2024   • Colorectal Cancer Screening  05/19/2024   • DXA SCAN  11/10/2025   • Influenza Vaccine  Completed   • Pneumococcal Vaccine: Pediatrics (0 to 5 Years) and At-Risk Patients (6 to 59 Years)  Aged Out   • HIB Vaccine  Aged Out   • IPV Vaccine  Aged Out   • Hepatitis A Vaccine  Aged Out   • Meningococcal ACWY Vaccine  Aged Out   • HPV Vaccine  Aged [employer] History   Administered Date(s) Administered   • COVID-19 PFIZER VACCINE 0 3 ML IM 04/22/2021, 05/15/2021, 11/16/2021   • COVID-19 Pfizer Vac BIVALENT Hi-sucrose 12 Yr+ IM (BOOSTER ONLY) 09/19/2022   • COVID-19 Pfizer vac (Hi-sucrose, gray cap) 12 yr+ IM 04/15/2022   • DTP 1960, 01/01/1964, 01/01/1965   • Hep B, adult 01/01/1996   • INFLUENZA 11/06/2006, 11/14/2007, 11/07/2008, 10/23/2009, 10/19/2010, 10/21/2011, 09/26/2012, 09/28/2012, 09/27/2013, 10/01/2013, 10/12/2015, 10/13/2016, 09/11/2018, 10/08/2021, 09/19/2022   • IPV 01/01/1965   • Influenza Quadrivalent 3 years and older 09/01/2018   • Influenza Quadrivalent, 6-35 Months IM 10/12/2015, 10/13/2016, 09/14/2017   • Influenza, injectable, quadrivalent, preservative free 0 5 mL 10/22/2019, 10/20/2020   • Influenza, seasonal, injectable 09/26/2012, 10/01/2013   • MMR 01/01/1965   • Td (adult), adsorbed 10/01/2012 • Tdap 01/01/1965, 10/01/2012   • Tuberculin Skin Test-PPD Intradermal 04/29/2014, 04/29/2014, 05/01/2014   • Zoster 05/11/2017         Diet and Physical Activity  Diet: well balanced diet  Body mass index is 27 05 kg/m²  Exercise: frequently      General Health  Hearing:  Is normal  Vision: sees ophthalmologist/optometrist q2yr  Dental:  Sees dentist every 6 months      Smoker no        ASSESSMENT/PLAN:-------------------------------------------------------------------------------------------    Patient's physical is up-to-date   Immunizations;  1st Shingrix today  2nd Shingrix in 2 months  Next year we will do the Adacel  Cancer screenings;  Pap test since April with GYN      Patient instructed on exercise  Patient instructed on healthy choices for diet       NEXT PHYSICAL 1 YEAR          The following portions of the patient's history were reviewed and updated as appropriate: allergies, current medications, past family history, past medical history, past social history, past surgical history and problem list       OBJECTIVE:---------------------------------------------------------------------------------------------------    /70 (BP Location: Left arm, Patient Position: Sitting, Cuff Size: Standard)   Pulse 59   Resp 16   Ht 5' 7 25" (1 708 m)   Wt 78 9 kg (174 lb)   SpO2 95%   BMI 27 05 kg/m²   Wt Readings from Last 3 Encounters:   01/27/23 78 9 kg (174 lb)   11/16/22 79 4 kg (175 lb 0 7 oz)   11/10/22 79 4 kg (175 lb)     BP Readings from Last 3 Encounters:   01/27/23 112/70   04/06/22 108/64   01/26/22 115/75     Pulse Readings from Last 3 Encounters:   01/27/23 59   01/26/22 (!) 48   01/25/22 61     Body mass index is 27 05 kg/m²    Health Maintenance   Topic Date Due   • Cervical Cancer Screening  03/04/2022   • DTaP,Tdap,and Td Vaccines (4 - Td or Tdap) 10/01/2022   • COVID-19 Vaccine (5 - Booster for Pfizer series) 11/14/2022   • BMI: Followup Plan  01/25/2023   • Annual Physical  01/25/2023 • HIV Screening  01/26/2024 (Originally 5/5/1975)   • Hepatitis C Screening  02/25/2024 (Originally 1960)   • BMI: Adult  11/16/2023   • Breast Cancer Screening: Mammogram  11/16/2023   • Depression Screening  01/27/2024   • Colorectal Cancer Screening  05/19/2024   • DXA SCAN  11/10/2025   • Influenza Vaccine  Completed   • Pneumococcal Vaccine: Pediatrics (0 to 5 Years) and At-Risk Patients (6 to 59 Years)  Aged Out   • HIB Vaccine  Aged Out   • IPV Vaccine  Aged Out   • Hepatitis A Vaccine  Aged Out   • Meningococcal ACWY Vaccine  Aged Out   • HPV Vaccine  Aged Out       ROS:   12 point review of systems negative            PHYSICAL EXAM:    Gen  No acute distress well-appearing well-nourished appears stated age    Mental status  Good judgment and insight oriented to time person and place, recent and remote memory intact mood and affect normal cooperative and patient is reasonable    HEENT  PERRLA 3 mm, EOMI without nystagmus, TMs clear, turbinates open pink no exudate, pharynx benign, tongue midline    Neck   supple no masses trachea midline positive click normal carotid upstrokes with no bruits    Cor  Regular rhythm without ectopy or murmur, no S3-S4, normal palpation that is no heave lift or thrill    Vascular  No edema, good pedal pulses    Lungs  CTA bilaterally in no respiratory distress no wheezes rhonchi or rales, normal to palpation no tactile fremitus    Abdomen  Soft, no palpable masses, no hepatosplenomegaly, normal bowel sounds, nontender    Lymphatics  No palpable nodes in the neck, supraclavicular area, axilla, or groin     Musculoskeletal  No clubbing cyanosis or edema muscle tone normal 12 point review of systems is negative    Skin  no rashes or abnormal appearing lesions    Neuro  Normal ambulation, cranial nerves 2-12 grossly intact, higher functioning with reasoning intact

## 2023-01-27 NOTE — PROGRESS NOTES
ASSESSMENT/PLAN:    Abdominal pain vague  Family history of pancreatic cancer  We will try to get a CT of abdomen with attention to pancreas to see if there is any signs of changes in her pancreas at this time    Dysplastic nevus  Patient to come back in 2 months for her 2nd Shingrix and for mole removal    Colonized urine  Patient is asymptomatic  Explained what this meant and we will not do anything unless she becomes symptomatic    Hypercholesterolemia  Cholesterol and LDL basically unchanged from last year  Cholesterol 278 and   However her coronary calcium score is superlow at 13 so she will try the best with her diet but we will not start a statin    Osteoporosis  Patient is on Actonel calcium vitamin D  She also exercises  Her next DEXA will be November 2024     Anxiety  Doing well with CBD     Recheck in 1 year sooner if needed  Shingrix No  1 today  Shingrix No  2 in 2 months along with mole removal  Adacel next year         BMI Counseling: Body mass index is 27 05 kg/m²  The BMI is above normal  Nutrition recommendations include decreasing portion sizes, encouraging healthy choices of fruits and vegetables and decreasing fast food intake  Exercise recommendations include moderate physical activity 150 minutes/week  Rationale for BMI follow-up plan is due to patient being overweight or obese  Depression Screening and Follow-up Plan: Patient was screened for depression during today's encounter  They screened negative with a PHQ-2 score of 0             Health Maintenance   Topic Date Due   • Cervical Cancer Screening  03/04/2022   • DTaP,Tdap,and Td Vaccines (4 - Td or Tdap) 10/01/2022   • COVID-19 Vaccine (5 - Booster for Pfizer series) 11/14/2022   • BMI: Followup Plan  01/25/2023   • Annual Physical  01/25/2023   • HIV Screening  01/26/2024 (Originally 5/5/1975)   • Hepatitis C Screening  02/25/2024 (Originally 1960)   • BMI: Adult  11/16/2023   • Breast Cancer Screening: Mammogram 11/16/2023   • Depression Screening  01/27/2024   • Colorectal Cancer Screening  05/19/2024   • DXA SCAN  11/10/2025   • Influenza Vaccine  Completed   • Pneumococcal Vaccine: Pediatrics (0 to 5 Years) and At-Risk Patients (6 to 59 Years)  Aged Out   • HIB Vaccine  Aged Out   • IPV Vaccine  Aged Out   • Hepatitis A Vaccine  Aged Out   • Meningococcal ACWY Vaccine  Aged Out   • HPV Vaccine  Aged Out         Problem List as of 1/27/2023 Reviewed: 1/26/2022 11:56 AM by Zara Mcdonald DPM    Anxiety    BMI 27 0-27 9,adult    DVT (deep vein thrombosis) in pregnancy    Hypercholesterolemia    Postmenopause atrophic vaginitis    Vitamin D deficiency         Subjective:   Chief Complaint   Patient presents with   • Physical Exam     Utd with gyn      Patient is here for her yearly checkup    Her only concern history with both her mother and maternal aunt with pancreatic cancer  She has some vague abdominal pain and is concerned    In the past year she saw her GYN and is coming up again in April  She had a coronary calcium score, saw podiatry and had her mammogram  All of these were reviewed    She has no bladder symptoms      patient ID: Zuhair Zafar is a 60 y o  female  Patient's past medical history, surgical history, family history, social history, and Tobacco history reviewed with patient  MED LIST WAS REVIEWED AND UPDATED    ROS  As per HPI  Rest of 12 point review of systems negative     Objective:      VITALS:  Wt Readings from Last 3 Encounters:   01/27/23 78 9 kg (174 lb)   11/16/22 79 4 kg (175 lb 0 7 oz)   11/10/22 79 4 kg (175 lb)     BP Readings from Last 3 Encounters:   01/27/23 112/70   04/06/22 108/64   01/26/22 115/75     Pulse Readings from Last 3 Encounters:   01/27/23 59   01/26/22 (!) 48   01/25/22 61     Body mass index is 27 05 kg/m²  Laboratory Results:    All pertinent labs and studies were reviewed with patient during this office visit with highlights of the results contained in this note in the ASSESSMENT AND PLAN section       Physical Exam      Gen  No acute distress well-appearing well-nourished appears stated age    Mental status  Good judgment and insight oriented to time person and place, recent and remote memory intact mood and affect normal cooperative and patient is reasonable    HEENT  PERRLA 3 mm, EOMI without nystagmus, normocephalic atraumatic without facial weakness    Neck   supple no masses trachea midline positive click normal carotid upstrokes with no bruits    Cor  Regular rhythm without ectopy or murmur, no S3-S4, normal palpation that is no heave lift or thrill    Vascular  No edema, good pedal pulses    Lungs  CTA bilaterally in no respiratory distress no wheezes rhonchi or rales, normal to palpation no tactile fremitus    Abdomen  Soft, no palpable masses, no hepatosplenomegaly, normal bowel sounds, nontender    Lymphatics  No palpable nodes in the neck, supraclavicular area, axilla, or groin    Musculoskeletal  No clubbing cyanosis or edema muscle tone normal    Skin  Back left upper area  Scapular is 6 mm mole with some darker changes inside    Neuro  Normal ambulation, cranial nerves 2-12 grossly intact, higher functioning with reasoning intact

## 2023-01-27 NOTE — PATIENT INSTRUCTIONS
We put in the order for the CT of your pancreas call central scheduling to order it and usually they make you wait until they get approval from your insurance but that we start the process    See you in 2 months for your 2nd shingles vaccine and to take the mole off    If you get any urinary symptoms let us know and we will recheck your urine    Otherwise we will do your routine recheck in 1 year and we will see you whenever you need

## 2023-02-06 ENCOUNTER — TELEPHONE (OUTPATIENT)
Dept: FAMILY MEDICINE CLINIC | Facility: CLINIC | Age: 63
End: 2023-02-06

## 2023-02-06 DIAGNOSIS — Z80.0 FAMILY HISTORY OF PANCREATIC CANCER: Primary | ICD-10-CM

## 2023-02-06 DIAGNOSIS — R10.84 GENERALIZED ABDOMINAL PAIN: ICD-10-CM

## 2023-02-06 NOTE — TELEPHONE ENCOUNTER
I received a call from central scheduling  The CT that you ordered is for "with contrast"  If it is with contrast, it cannot be PO, must only be IV  If you don't want contrast, can be PO  Either way, a new order must be entered

## 2023-02-09 ENCOUNTER — HOSPITAL ENCOUNTER (OUTPATIENT)
Dept: RADIOLOGY | Facility: HOSPITAL | Age: 63
Discharge: HOME | End: 2023-02-09

## 2023-02-09 DIAGNOSIS — R10.84 GENERALIZED ABDOMINAL PAIN: ICD-10-CM

## 2023-02-09 DIAGNOSIS — Z80.0 FAMILY HISTORY OF PANCREATIC CANCER: ICD-10-CM

## 2023-02-17 ENCOUNTER — TELEPHONE (OUTPATIENT)
Dept: FAMILY MEDICINE CLINIC | Facility: CLINIC | Age: 63
End: 2023-02-17

## 2023-02-20 ENCOUNTER — TELEPHONE (OUTPATIENT)
Dept: FAMILY MEDICINE CLINIC | Facility: CLINIC | Age: 63
End: 2023-02-20

## 2023-02-20 DIAGNOSIS — R91.8 MASS OF LOWER LOBE OF LEFT LUNG: Primary | ICD-10-CM

## 2023-02-20 NOTE — TELEPHONE ENCOUNTER
----- Message from Emmanuelle Dawson DO sent at 2/20/2023  8:31 AM EST -----  Message left for patient to call me regarding her CT of the abdomen    Nothing else left on the message  Order for CT of entire lung and consult with CT surgery placed in system    Waiting for patient to call back      I left another message

## 2023-02-27 ENCOUNTER — HOSPITAL ENCOUNTER (OUTPATIENT)
Dept: RADIOLOGY | Facility: HOSPITAL | Age: 63
Discharge: HOME | End: 2023-02-27

## 2023-02-27 DIAGNOSIS — R91.8 MASS OF LOWER LOBE OF LEFT LUNG: ICD-10-CM

## 2023-03-01 ENCOUNTER — TELEPHONE (OUTPATIENT)
Dept: FAMILY MEDICINE CLINIC | Facility: CLINIC | Age: 63
End: 2023-03-01

## 2023-03-01 NOTE — TELEPHONE ENCOUNTER
----- Message from Roslynn Homans, DO sent at 3/1/2023  3:04 PM EST -----  Please call patient to ensure that she is seeing the CT surgeon tomorrow, Thursday, March 2   Called to inform her that the CT dedicated to the chest only continues that showed the nodule that may need to be removed  Patient was informed

## 2023-03-01 NOTE — RESULT ENCOUNTER NOTE
Please call patient to ensure that she is seeing the CT surgeon tomorrow, Thursday, March 2   Called to inform her that the CT dedicated to the chest only continues that showed the nodule that may need to be removed

## 2023-03-02 ENCOUNTER — VBI (OUTPATIENT)
Dept: ADMINISTRATIVE | Facility: OTHER | Age: 63
End: 2023-03-02

## 2023-03-02 ENCOUNTER — DOCUMENTATION (OUTPATIENT)
Dept: CARDIAC SURGERY | Facility: CLINIC | Age: 63
End: 2023-03-02

## 2023-03-02 ENCOUNTER — OFFICE VISIT (OUTPATIENT)
Dept: CARDIAC SURGERY | Facility: CLINIC | Age: 63
End: 2023-03-02

## 2023-03-02 VITALS
SYSTOLIC BLOOD PRESSURE: 138 MMHG | RESPIRATION RATE: 17 BRPM | HEIGHT: 67 IN | BODY MASS INDEX: 27.65 KG/M2 | TEMPERATURE: 98.9 F | OXYGEN SATURATION: 100 % | DIASTOLIC BLOOD PRESSURE: 78 MMHG | WEIGHT: 176.15 LBS | HEART RATE: 64 BPM

## 2023-03-02 DIAGNOSIS — R91.8 MASS OF LOWER LOBE OF LEFT LUNG: ICD-10-CM

## 2023-03-02 NOTE — PROGRESS NOTES
Thoracic Consult  Assessment/Plan:    Mass of lower lobe of left lung  Ms Andressa Valderrama is a 57-year-old female who presents to me with a left lower lobe superior segment mixed groundglass opacity  In the office, we do long conversation regarding her groundglass opacity  I did explain to her that it is hard to tell whether or not the solid component within the nodule is actually atelectasis  At this time, I would recommend a PET CT scan  If this is atelectasis or scar tissue, then there should be no avidity within this nodule  If there is some avidity, I believe that resection of this nodule is warranted  At this time, I have scheduled her for PET CT scan as well as pulmonary function testing to determine surgical candidacy  The patient will return to my office after the above has been completed to determine the next steps  Latricia Alarcon MD  Thoracic Surgery  (Available on [employer])  Office: [phone]         Diagnoses and all orders for this visit:    Mass of lower lobe of left lung  -     Ambulatory Referral to Cardiovascular Surgery  -     Complete PFT with post bronchodilator; Future  -     NM PET CT skull base to mid thigh; Future          Thoracic History   Diagnosis:    Procedures/Surgeries:    Pathology:    Adjuvant Therapy:       Subjective:    Patient ID: Gabriela Chairez is a 60 y o  female  HPI  Ms Andressa Valderrama is a 57-year-old female who presents to me with a left lower lobe superior segment mixed groundglass opacity  I have personally reviewed all of her imaging in PACS  This appears to be a mostly groundglass nodule although there appears to be a solid component in the center of the nodule  While the radiology read does say that they feel that the solid component is actually scar or atelectasis, I believe that it is very difficult to do be able to determine that on the current film  Today in the office, the patient reports that she is doing well    She reports that she is in overall very good state of health  She denies any shortness of breath or chest pain  She does report that she is having some nerve pain over her chest that she associates with some anxiety  She denies any recent upper respiratory infection, pneumonia or fevers or chills  She denies a chronic cough  She does report some seasonal allergies that cause some throat clearing and increased phlegm  Patient is a never smoker  The patient denies a personal history of cancer  The patient denies any family history of lung cancer      The following portions of the patient's history were reviewed and updated as appropriate: allergies, current medications, past family history, past medical history, past social history, past surgical history and problem list     Past Medical History:   Diagnosis Date   • Actinic keratosis     last assessed - [id number]   • Anemia    • BRCA1 negative     neg results   • Breast lump     last assessed - [id number]   • Chest tightness or pressure     Resolved - [id number]   • DVT (deep vein thrombosis) in pregnancy    • Shoulder pain, left     last assessed - [id number]      Past Surgical History:   Procedure Laterality Date   • BREAST BIOPSY Right     neg results   •  SECTION      x 2   • COLONOSCOPY      Complete Colonoscopy   • TONSILLECTOMY        Family History   Problem Relation Age of Onset   • Anxiety disorder Mother    • Breast cancer Mother         in    • Pancreatic cancer Mother 80   • Parkinsonism Father    • Hypertension Father    • Prostate cancer Father         age unknown   • No Known Problems Maternal Grandmother    • Coronary artery disease Maternal Grandfather    • Breast cancer Paternal Grandmother         age unknown   • Stroke Paternal Grandmother    • Depression Daughter    • Breast cancer Maternal Aunt         age unknown   • Pancreatic cancer Maternal Aunt 80   • No Known Problems Paternal Grandfather    • Alcohol abuse Neg Hx    • Substance Abuse Neg Hx       Social History     Socioeconomic History   • Marital status: /Civil Union     Spouse name: Not on file   • Number of children: Not on file   • Years of education: Not on file   • Highest education level: Not on file   Occupational History   • 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   Tobacco Use   • Smoking status: Never   • Smokeless tobacco: Never   Vaping Use   • Vaping Use: Never used   Substance and Sexual Activity   • Alcohol use: Yes     Comment: Drinks wine   • Drug use: No   • Sexual activity: Yes     Partners: Male     Birth control/protection: None   Other Topics Concern   • Not on file   Social History Narrative    Activities:  Treadmill    Always uses seat belt    Caffeine use - 2 cups coffee per day    Exercise: Running    Exercise: Walking    Exercises moderately less than 3 times a week    Has smoke detectors    Weight training     Social Determinants of Health     Financial Resource Strain: Not on file   Food Insecurity: Not on file   Transportation Needs: Not on file   Physical Activity: Not on file   Stress: Not on file   Social Connections: Not on file   Intimate Partner Violence: Not on file   Housing Stability: Not on file      Review of Systems   Constitutional: Negative for chills, fatigue, fever and unexpected weight change  HENT: Negative  Eyes: Negative  Negative for visual disturbance  Respiratory: Negative for cough, shortness of breath and stridor  Cardiovascular: Negative for chest pain  Gastrointestinal: Negative  Endocrine: Negative  Genitourinary: Negative  Musculoskeletal: Negative  Skin: Negative  Allergic/Immunologic: Positive for environmental allergies  Neurological: Negative for dizziness, light-headedness and headaches  Hematological: Negative for adenopathy  Psychiatric/Behavioral: Negative  Objective:   Physical Exam  Vitals and nursing note reviewed  Constitutional:       General: She is not in acute distress  Appearance: Normal appearance  She is well-developed  She is not diaphoretic  HENT:      Head: Normocephalic and atraumatic  Nose: Nose normal  No congestion or rhinorrhea  Mouth/Throat:      Mouth: Mucous membranes are moist       Pharynx: Oropharynx is clear  No oropharyngeal exudate  Eyes:      General: No scleral icterus  Pupils: Pupils are equal, round, and reactive to light  Neck:      Trachea: No tracheal deviation  Cardiovascular:      Rate and Rhythm: Normal rate and regular rhythm  Pulses: Normal pulses  Heart sounds: Normal heart sounds  No murmur heard  Pulmonary:      Effort: Pulmonary effort is normal  No respiratory distress  Breath sounds: Normal breath sounds  No stridor  No wheezing or rales  Chest:      Chest wall: No tenderness  Abdominal:      General: Bowel sounds are normal  There is no distension  Palpations: Abdomen is soft  Tenderness: There is no abdominal tenderness  There is no rebound  Musculoskeletal:         General: Normal range of motion  Cervical back: Normal range of motion and neck supple  No muscular tenderness  Lymphadenopathy:      Cervical: No cervical adenopathy  Skin:     General: Skin is warm and dry  Coloration: Skin is not jaundiced or pale  Findings: No erythema or rash  Neurological:      General: No focal deficit present  Mental Status: She is alert and oriented to person, place, and time  Psychiatric:         Mood and Affect: Mood normal          Behavior: Behavior normal          Thought Content: Thought content normal          Judgment: Judgment normal      /78 (BP Location: Left arm, Patient Position: Sitting, Cuff Size: Standard)   Pulse 64   Temp 98 9 °F (37 2 °C) (Temporal)   Resp 17   Ht 5' 7 25" (1 708 m)   Wt 79 9 kg (176 lb 2 4 oz)   SpO2 100%   BMI 27 38 kg/m²     No Chest XR results available for this patient     CT chest wo contrast    Result Date: 3/1/2023  Narrative CT CHEST WITHOUT IV CONTRAST INDICATION:   R91 8: Other nonspecific abnormal finding of lung field  Per my review of the medical record, the abdomen CT from 2/9/2023 shows 1 8 cm left lower lobe nodule  Patient is scheduled to see thoracic surgery tomorrow  COMPARISON:  Abdomen CT 02/09/2023 and calcium scoring CT 06/04/2022  TECHNIQUE: Chest CT without intravenous contrast   Axial, sagittal, coronal 2D reformats and coronal MIPS from source data  Radiation dose length product (DLP):  238 54 mGy-cm   Radiation dose exposure minimized using iterative reconstruction and automated exposure control  FINDINGS: LUNGS:  2 0 cm superior segment lateral left lower lobe groundglass nodule with spiculation extending to the major fissure  What appears to be a solid component on the axial series instead has the appearance of adjacent atelectasis/scar on the sagittal and coronal series  Benign calcified granulomas  AIRWAYS: No significant filling defects  PLEURA:  Unremarkable  HEART/GREAT VESSELS:  Normal heart size  Mild coronary artery calcification indicating atherosclerotic heart disease  MEDIASTINUM AND KANDI:  Unremarkable  CHEST WALL AND LOWER NECK: Clips in the right breast  UPPER ABDOMEN:  Unremarkable  OSSEOUS STRUCTURES: Mild degenerative disease in the spine  Impression 2 0 cm lateral superior segment left lower lobe groundglass nodule with spiculation extending to the major fissure  There is no definite solid component; what appears to be a solid component in the axial series is instead due to adjacent atelectasis/scar in the sagittal and coronal series  This could be an adenocarcinoma spectrum lesion and follow-up is recommended with a chest CT with no contrast in 6-12 months per 2017 Fleischner Society guidelines  This study was marked in Epic for follow-up  Workstation performed: [id number]     No CT Chest,Abdomen,Pelvis results available for this patient     No NM PET CT results available for this patient  No Barium Swallow results available for this patient

## 2023-03-02 NOTE — PROGRESS NOTES
I met with [employer] at her visit with Dr Kaden Noriega today  I introduced myself and explained my role to her  Questions answered, support provided  She was given my business card for any future questions or concerns

## 2023-03-02 NOTE — ASSESSMENT & PLAN NOTE
Ms Edi Samson is a 21-year-old female who presents to me with a left lower lobe superior segment mixed groundglass opacity  In the office, we do long conversation regarding her groundglass opacity  I did explain to her that it is hard to tell whether or not the solid component within the nodule is actually atelectasis  At this time, I would recommend a PET CT scan  If this is atelectasis or scar tissue, then there should be no avidity within this nodule  If there is some avidity, I believe that resection of this nodule is warranted  At this time, I have scheduled her for PET CT scan as well as pulmonary function testing to determine surgical candidacy  The patient will return to my office after the above has been completed to determine the next steps      Darylene Rodney, MD  Thoracic Surgery  (Available on [place] [employer])  Office: [phone]

## 2023-03-06 ENCOUNTER — TELEPHONE (OUTPATIENT)
Dept: OBGYN CLINIC | Facility: CLINIC | Age: 63
End: 2023-03-06

## 2023-03-06 DIAGNOSIS — Z91.89 AT HIGH RISK FOR BREAST CANCER: Primary | ICD-10-CM

## 2023-03-06 NOTE — TELEPHONE ENCOUNTER
----- Message from Mayte Real, [address] sent at 3/6/2023  2:19 PM EST -----  Please call her with the following: Eliz Santizo patient)     Screening mammogram results are normal    Continue with monthly breast self exam and annual mammogram    Lifetime breast cancer risk score (devonte) is elevated above the threshold of 20%, which is related to your family history of breast cancer  Supplemental screening of a breast MRI is recommended  This should occur 6 months from the time this mammogram was completed(Could be done around May)  The order in in your chart  Please call central scheduling at [phone] to choose a time convenient for you  Please call the office [phone] one month prior to the planned MRI for the test to be pre authorized

## 2023-03-06 NOTE — TELEPHONE ENCOUNTER
----- Message from Sri Kessler, Rachel Austin sent at 3/6/2023  2:19 PM EST -----  Please call her with the following: Maria Del Carmen Mood patient)     Screening mammogram results are normal    Continue with monthly breast self exam and annual mammogram    Lifetime breast cancer risk score (devonte) is elevated above the threshold of 20%, which is related to your family history of breast cancer  Supplemental screening of a breast MRI is recommended  This should occur 6 months from the time this mammogram was completed(Could be done around May)  The order in in your chart  Please call central scheduling at [phone] to choose a time convenient for you  Please call the office [phone] one month prior to the planned MRI for the test to be pre authorized

## 2023-03-06 NOTE — TELEPHONE ENCOUNTER
Results discussed w/ patient   She will think about this and make appointment for [facility]

## 2023-03-08 DIAGNOSIS — M81.0 AGE-RELATED OSTEOPOROSIS WITHOUT CURRENT PATHOLOGICAL FRACTURE: ICD-10-CM

## 2023-03-08 RX ORDER — RISEDRONATE SODIUM 35 MG/1
TABLET, FILM COATED ORAL
Qty: 12 TABLET | Refills: 0 | Status: SHIPPED | OUTPATIENT
Start: 2023-03-08

## 2023-03-09 ENCOUNTER — HOSPITAL ENCOUNTER (OUTPATIENT)
Dept: PULMONOLOGY | Facility: HOSPITAL | Age: 63
End: 2023-03-09
Attending: THORACIC SURGERY (CARDIOTHORACIC VASCULAR SURGERY)

## 2023-03-09 DIAGNOSIS — R91.8 MASS OF LOWER LOBE OF LEFT LUNG: ICD-10-CM

## 2023-03-09 RX ORDER — ALBUTEROL SULFATE 2.5 MG/3ML
2.5 SOLUTION RESPIRATORY (INHALATION) ONCE
Status: COMPLETED | OUTPATIENT
Start: 2023-03-09 | End: 2023-03-09

## 2023-03-09 RX ADMIN — ALBUTEROL SULFATE 2.5 MG: 2.5 SOLUTION RESPIRATORY (INHALATION) at 07:25

## 2023-03-16 ENCOUNTER — HOSPITAL ENCOUNTER (OUTPATIENT)
Dept: RADIOLOGY | Age: 63
Discharge: HOME | End: 2023-03-16

## 2023-03-16 DIAGNOSIS — R91.8 MASS OF LOWER LOBE OF LEFT LUNG: ICD-10-CM

## 2023-03-16 LAB — GLUCOSE SERPL-MCNC: 95 MG/DL (ref 65–140)

## 2023-03-27 ENCOUNTER — OFFICE VISIT (OUTPATIENT)
Dept: CARDIAC SURGERY | Facility: CLINIC | Age: 63
End: 2023-03-27

## 2023-03-27 VITALS
WEIGHT: 177.91 LBS | DIASTOLIC BLOOD PRESSURE: 78 MMHG | HEIGHT: 67 IN | RESPIRATION RATE: 21 BRPM | HEART RATE: 55 BPM | SYSTOLIC BLOOD PRESSURE: 128 MMHG | OXYGEN SATURATION: 99 % | TEMPERATURE: 97.3 F | BODY MASS INDEX: 27.92 KG/M2

## 2023-03-27 DIAGNOSIS — R91.8 MASS OF LOWER LOBE OF LEFT LUNG: Primary | ICD-10-CM

## 2023-03-27 RX ORDER — CEFAZOLIN SODIUM 2 G/50ML
2000 SOLUTION INTRAVENOUS ONCE
OUTPATIENT
Start: 2023-03-27 | End: 2023-03-27

## 2023-03-27 RX ORDER — ACETAMINOPHEN 325 MG/1
975 TABLET ORAL ONCE
OUTPATIENT
Start: 2023-03-27 | End: 2023-03-27

## 2023-03-27 RX ORDER — HEPARIN SODIUM 5000 [USP'U]/ML
5000 INJECTION, SOLUTION INTRAVENOUS; SUBCUTANEOUS
OUTPATIENT
Start: 2023-03-28 | End: 2023-03-29

## 2023-03-27 RX ORDER — GABAPENTIN 300 MG/1
600 CAPSULE ORAL ONCE
OUTPATIENT
Start: 2023-03-27 | End: 2023-03-27

## 2023-03-27 RX ORDER — CELECOXIB 200 MG/1
200 CAPSULE ORAL ONCE
OUTPATIENT
Start: 2023-03-27 | End: 2023-03-27

## 2023-03-27 NOTE — PROGRESS NOTES
Assessment/Plan:    Mass of lower lobe of left lung  We had a discussion with Deyanira regarding her testing  Her PFT's were within normal limits  However, there was no uptake on her PET scan within the nodule  She has two options, which includes close interval follow up or a thoracoscopic wedge resection  If she chooses follow up, we would obtain a CT scan in 3 months  The patient is nervous about the nodule, so she was presented with an additional option, which would be an IR biopsy  She would like to proceed with surgery and therefore the procedure, possible risks, and post operative course were explained all questions were answered  She will undergo an EKG and blood work prior to the procedure  Consent was signed  The procedure includes a flexible bronchoscopy, left thoracoscopic lower lobe wedge resection, possible completion lower lobectomy  She is in agreement with the plan  Diagnoses and all orders for this visit:    Mass of lower lobe of left lung  -     Case request operating room: [facility] 26 (VATS), RESECTION WEDGE LUNG, LOBECTOMY LUNG, BRONCHOSCOPY FLEXIBLE; Standing  -     EKG 12 lead; Future  -     Type and screen; Future  -     APTT; Future  -     Protime-INR; Future  -     CBC and Platelet; Future  -     Basic metabolic panel; Future  -     Case request operating room: [facility] 26 (VATS), RESECTION WEDGE LUNG, LOBECTOMY LUNG, BRONCHOSCOPY FLEXIBLE    Other orders  -     Diet NPO; Sips with meds; Standing  -     Nursing communication Please give pre-op Carbohydrate drink to patient 2-4 hours prior to surgery; Standing  -     Void on call to OR; Standing  -     Insert peripheral IV;  Standing  -     Place sequential compression device; Standing  -     acetaminophen (TYLENOL) tablet 975 mg  -     gabapentin (NEURONTIN) capsule 600 mg  -     heparin (porcine) subcutaneous injection 5,000 Units  -     celecoxib (CeleBREX) capsule 200 mg  - ceFAZolin (ANCEF) IVPB (premix in dextrose) 2,000 mg 50 mL          Thoracic History   Cancer Staging   No matching staging information was found for the patient  Oncology History    No history exists  Patient ID: Wayne Wilkins is a 60 y o  female  ECOG 0     HPI     Kate Zuñiga is a 61 yo female who we evaluated on 3/2/23 for a left lower lobe mixed nodule with a solid component  They read the solid component as scar/ atelectasis  Dr Antionette Alexander recommended a PET and PFT's  She had a DVT after her pregnancy about 37 yrs ago  She returns today with PFT's from 3/9/23 which reveals a FVC of 3 05%, 88%, FEV1 2 54L of 94%, and a DLCO of 90% predicted  PET from 3/16/23 revealed a SUV of 1 1 with the 2 cm left lower lobe nodule, which is same as background activity  No hilar or mediastinal uptake  Radiology still feels it could be on the adenocarcinoma spectrum and is recommending a repeat CT scan in 6 months  There was no other FDG uptake  On discussion,  She is feeling pretty well  She does have a slight throat tickle, but was painting this weekend  She otherwise denies fever, chills, cough, shortness of breath, chest pain, or new bone pains  She is a lifetime non smoker       The following portions of the patient's history were reviewed and updated as appropriate: allergies, current medications, past family history, past medical history, past social history, past surgical history and problem list     Past Medical History:   Diagnosis Date   • Actinic keratosis     last assessed - [id number]   • Anemia    • BRCA1 negative     neg results   • Breast lump     last assessed - [id number]   • Chest tightness or pressure     Resolved - [id number]   • DVT (deep vein thrombosis) in pregnancy    • Shoulder pain, left     last assessed - [id number]     Past Surgical History:   Procedure Laterality Date   • BREAST BIOPSY Right     neg results   •  SECTION      x 2   • COLONOSCOPY      Complete Colonoscopy • TONSILLECTOMY       Family History   Problem Relation Age of Onset   • Anxiety disorder Mother    • Breast cancer Mother         in 2000   • Pancreatic cancer Mother 80   • Parkinsonism Father    • Hypertension Father    • Prostate cancer Father         age unknown   • No Known Problems Maternal Grandmother    • Coronary artery disease Maternal Grandfather    • Breast cancer Paternal Grandmother         age unknown   • Stroke Paternal Grandmother    • Depression Daughter    • Breast cancer Maternal Aunt         age unknown   • Pancreatic cancer Maternal Aunt 80   • No Known Problems Paternal Grandfather    • Alcohol abuse Neg Hx    • Substance Abuse Neg Hx      Social History     Socioeconomic History   • Marital status: /Civil Union     Spouse name: Not on file   • Number of children: Not on file   • Years of education: Not on file   • Highest education level: Not on file   Occupational History   • 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   Tobacco Use   • Smoking status: Never   • Smokeless tobacco: Never   Vaping Use   • Vaping Use: Never used   Substance and Sexual Activity   • Alcohol use: Yes     Comment: Drinks wine   • Drug use: No   • Sexual activity: Yes     Partners: Male     Birth control/protection: None   Other Topics Concern   • Not on file   Social History Narrative    Activities:  Treadmill    Always uses seat belt    Caffeine use - 2 cups coffee per day    Exercise: Running    Exercise: Walking    Exercises moderately less than 3 times a week    Has smoke detectors    Weight training     Social Determinants of Health     Financial Resource Strain: Not on file   Food Insecurity: Not on file   Transportation Needs: Not on file   Physical Activity: Not on file   Stress: Not on file   Social Connections: Not on file   Intimate Partner Violence: Not on file   Housing Stability: Not on file     Allergies   Allergen Reactions   • Other Allergic Rhinitis     Seasonal      Current Outpatient Medications on File Prior to Visit   Medication Sig Dispense Refill   • Ascorbic Acid (Vitamin C) 500 MG CAPS Take by mouth daily     • aspirin 81 mg chewable tablet Chew 1 tablet daily     • Calcium-Magnesium (RODRICK/MAG CITRATE) 250-125 MG TABS 3 daily     • Glucosamine-Chondroitin 250-200 MG TABS Take 2 tablets by mouth daily     • Iron-Vitamin C 100-250 MG TABS Take 1 tablet by mouth daily     • Multiple Vitamins-Minerals (WOMENS ONE DAILY) TABS Take 1 tablet by mouth daily     • Omega-3 Fatty Acids (OMEGA-3 FISH OIL) 1000 MG CAPS Take 1 capsule by mouth daily     • polyethylene glycol (GLYCOLAX) 17 GM/SCOOP powder Take 17 g by mouth daily as needed     • Probiotic Product (PROBIOTIC-10 PO) Take by mouth 2 (two) times a day      • risedronate (ACTONEL) 35 mg tablet TAKE 1 TAB EVERY 7 DAYS WITH WATER ON EMPTY STOMACH, NOTHING BY MOUTH OR LIE DOWN FOR 30 MINUTES  12 tablet 0   • zinc gluconate 50 mg tablet Take 50 mg by mouth daily       No current facility-administered medications on file prior to visit  Review of Systems   Constitutional: Negative for chills, fatigue, fever and unexpected weight change  HENT: Negative  Eyes: Positive for visual disturbance (wears glasses)  Respiratory: Negative for cough, shortness of breath and stridor  Cardiovascular: Negative for chest pain and palpitations  Gastrointestinal: Negative for abdominal distention, abdominal pain, nausea and vomiting  Endocrine: Negative  Genitourinary: Negative  Musculoskeletal: Negative  Skin: Negative for color change and pallor  Allergic/Immunologic: Positive for environmental allergies  Neurological: Negative for dizziness, light-headedness and headaches  Hematological: Negative for adenopathy  Psychiatric/Behavioral: Negative  All other systems reviewed and are negative  Objective:   Physical Exam  Vitals and nursing note reviewed  Constitutional:       General: She is not in acute distress       Appearance: "Normal appearance  She is well-developed  She is not diaphoretic  HENT:      Head: Normocephalic and atraumatic  Comments: + glasses     Nose: Nose normal  No rhinorrhea  Mouth/Throat:      Comments: Masked   Eyes:      General: No scleral icterus  Extraocular Movements: Extraocular movements intact  Neck:      Trachea: No tracheal deviation  Cardiovascular:      Rate and Rhythm: Normal rate and regular rhythm  Pulses: Normal pulses  Heart sounds: Normal heart sounds  No murmur heard  Pulmonary:      Effort: Pulmonary effort is normal  No respiratory distress  Breath sounds: Normal breath sounds  No wheezing  Abdominal:      General: Bowel sounds are normal  There is no distension  Palpations: Abdomen is soft  Musculoskeletal:      Cervical back: Normal range of motion and neck supple  No muscular tenderness  Right lower leg: No edema  Left lower leg: No edema  Lymphadenopathy:      Cervical: No cervical adenopathy  Skin:     General: Skin is warm and dry  Coloration: Skin is not jaundiced  Neurological:      General: No focal deficit present  Mental Status: She is alert and oriented to person, place, and time  Psychiatric:         Mood and Affect: Mood normal          Behavior: Behavior normal          Thought Content: Thought content normal          Judgment: Judgment normal      /78   Pulse 55   Temp (!) 97 3 °F (36 3 °C)   Resp 21   Ht 5' 7 25\" (1 708 m)   Wt 80 7 kg (177 lb 14 6 oz)   SpO2 99%   BMI 27 66 kg/m²       CT chest wo contrast    Result Date: 3/1/2023  Narrative CT CHEST WITHOUT IV CONTRAST INDICATION:   R91 8: Other nonspecific abnormal finding of lung field  Per my review of the medical record, the abdomen CT from 2/9/2023 shows 1 8 cm left lower lobe nodule  Patient is scheduled to see thoracic surgery tomorrow  COMPARISON:  Abdomen CT 02/09/2023 and calcium scoring CT 06/04/2022   TECHNIQUE: Chest CT without " intravenous contrast   Axial, sagittal, coronal 2D reformats and coronal MIPS from source data  Radiation dose length product (DLP):  238 54 mGy-cm   Radiation dose exposure minimized using iterative reconstruction and automated exposure control  FINDINGS: LUNGS:  2 0 cm superior segment lateral left lower lobe groundglass nodule with spiculation extending to the major fissure  What appears to be a solid component on the axial series instead has the appearance of adjacent atelectasis/scar on the sagittal and coronal series  Benign calcified granulomas  AIRWAYS: No significant filling defects  PLEURA:  Unremarkable  HEART/GREAT VESSELS:  Normal heart size  Mild coronary artery calcification indicating atherosclerotic heart disease  MEDIASTINUM AND KANDI:  Unremarkable  CHEST WALL AND LOWER NECK: Clips in the right breast  UPPER ABDOMEN:  Unremarkable  OSSEOUS STRUCTURES: Mild degenerative disease in the spine  Impression 2 0 cm lateral superior segment left lower lobe groundglass nodule with spiculation extending to the major fissure  There is no definite solid component; what appears to be a solid component in the axial series is instead due to adjacent atelectasis/scar in the sagittal and coronal series  This could be an adenocarcinoma spectrum lesion and follow-up is recommended with a chest CT with no contrast in 6-12 months per 2017 Fleischner Society guidelines  This study was marked in Epic for follow-up  Workstation performed: [id number]       NM PET CT skull base to mid thigh    Result Date: 3/16/2023  Narrative PET/CT SCAN INDICATION: R91 8: Other nonspecific abnormal finding of lung field Abnormal CT of the chest demonstrating left lung opacity  MODIFIER: PI COMPARISON: CT chest, 2/27/2023 CELL TYPE:  N/A TECHNIQUE:   8 0 mCi F-18-FDG administered IV   Multiplanar attenuation corrected and non attenuation corrected PET images are available for interpretation, and contiguous, low dose, axial CT sections were obtained from the skull base through the femurs  Intravenous contrast material was not utilized  This examination, like all CT scans performed in the [facility], was performed utilizing techniques to minimize radiation dose exposure, including the use of iterative reconstruction and automated exposure control  Fasting serum glucose: 95 mg/dl FINDINGS: VISUALIZED BRAIN:   No acute abnormalities are seen  HEAD/NECK:   There is a physiologic distribution of FDG  No FDG avid cervical adenopathy is seen  Focus of activity in the right neck image 49 felt to be related to strap muscle activity CT images: Unremarkable  CHEST:   - Irregular 2 cm lesion in the left lower lobe (image 97) with spiculation extending to the major fissure demonstrates no significant FDG activity  SUV max is 1 1, for comparison this is essentially the same as background lung activity -There is no evidence of FDG avid mediastinal or hilar adenopathy  CT images: No pleural or pericardial effusion  Mild coronary artery calcification present ABDOMEN:   No FDG avid soft tissue lesions are seen  CT images: No significant findings PELVIS: No FDG avid soft tissue lesions are seen  CT images: Unremarkable  OSSEOUS STRUCTURES: No FDG avid lesions are seen  CT images: No significant findings  Impression 1  The 2 cm left lower lobe lesion demonstrates no abnormal FDG avidity  Nevertheless based on its imaging characteristics this remains most concerning for adenocarcinoma spectrum lesion  Based on current Fleischner Society 2017 Guidelines on incidental pulmonary nodule, at minimum this showed be reassessed with noncontrast chest CT in 6 months time  2  No evidence of FDG avid mediastinal or hilar adenopathy  No evidence of FDG avid distant metastatic disease  The examination demonstrates a finding requiring imaging follow-up and was logged as such in [address] [place] [address] [employer]   Workstation performed: [id number]

## 2023-03-27 NOTE — ASSESSMENT & PLAN NOTE
We had a discussion with Deyanira regarding her testing  Her PFT's were within normal limits  However, there was no uptake on her PET scan within the nodule  She has two options, which includes close interval follow up or a thoracoscopic wedge resection  If she chooses follow up, we would obtain a CT scan in 3 months  The patient is nervous about the nodule, so she was presented with an additional option, which would be an IR biopsy  She would like to proceed with surgery and therefore the procedure, possible risks, and post operative course were explained all questions were answered  She will undergo an EKG and blood work prior to the procedure  Consent was signed  The procedure includes a flexible bronchoscopy, left thoracoscopic lower lobe wedge resection, possible completion lower lobectomy  She is in agreement with the plan

## 2023-03-29 ENCOUNTER — RA CDI HCC (OUTPATIENT)
Dept: OTHER | Facility: HOSPITAL | Age: 63
End: 2023-03-29

## 2023-03-29 NOTE — PROGRESS NOTES
[facility] 75  coding opportunities       Chart reviewed, no opportunity found: CHART REVIEWED, NO OPPORTUNITY FOUND        Patients Insurance        Commercial Insurance: [employer]

## 2023-04-04 ENCOUNTER — OFFICE VISIT (OUTPATIENT)
Dept: FAMILY MEDICINE CLINIC | Facility: CLINIC | Age: 63
End: 2023-04-04

## 2023-04-04 VITALS
BODY MASS INDEX: 27.61 KG/M2 | DIASTOLIC BLOOD PRESSURE: 72 MMHG | HEIGHT: 67 IN | HEART RATE: 65 BPM | WEIGHT: 175.9 LBS | OXYGEN SATURATION: 98 % | RESPIRATION RATE: 18 BRPM | SYSTOLIC BLOOD PRESSURE: 130 MMHG

## 2023-04-04 DIAGNOSIS — Z23 NEED FOR ZOSTER VACCINE: ICD-10-CM

## 2023-04-04 DIAGNOSIS — Z86.018 HISTORY OF CHANGING SKIN MOLE: Primary | ICD-10-CM

## 2023-04-04 DIAGNOSIS — Z23 NEED FOR VACCINATION: ICD-10-CM

## 2023-04-04 NOTE — PATIENT INSTRUCTIONS
Keep the area clean and dry for the rest of today  Tomorrow take off the dressing and take a shower or wash as she normally would  Please wash this area with soap and water being careful to the area  Please put antibiotic or Vaseline on the pad of a Band-Aid and cover this area     If you cannot reach your self, please ask someone to assist you  Please do this for at least 10 days or until it is totally healed  Please call if you see any signs of infection such as:  Redness  Discharge  You have pain  The area becomes swollen

## 2023-04-04 NOTE — PROGRESS NOTES
Shave lesion    Date/Time: 4/4/2023 3:06 PM  Performed by: Unique Sue DO  Authorized by: Unique Sue DO   Universal Protocol:  Consent: Verbal consent obtained  Written consent obtained    Consent given by: patient  Patient understanding: patient states understanding of the procedure being performed  Patient identity confirmed: verbally with patient      Number of Lesions: 1  Lesion 1:     Body area: trunk    Trunk location: back    Initial size (mm): 6    Final defect size (mm): 8    Malignancy: malignancy unknown      Comments:  Pathology sent  Patient tolerated procedure well  Oral and written instructions given      This was to take care of the lesion that was found on her last office visit 2 months ago

## 2023-04-05 ENCOUNTER — ANESTHESIA EVENT (OUTPATIENT)
Dept: PERIOP | Facility: HOSPITAL | Age: 63
End: 2023-04-05

## 2023-04-05 NOTE — PRE-PROCEDURE INSTRUCTIONS
Pre-Surgery Instructions:   Medication Instructions    Ascorbic Acid (Vitamin C) 500 MG CAPS Stop taking 7 days prior to surgery   aspirin 81 mg chewable tablet Stop taking 7 days prior to surgery   Calcium-Magnesium (RODRICK/MAG CITRATE) 250-125 MG TABS Stop taking 7 days prior to surgery   Glucosamine-Chondroitin 250-200 MG TABS Stop taking 7 days prior to surgery   Iron-Vitamin C 100-250 MG TABS Take day of surgery   Multiple Vitamins-Minerals (WOMENS ONE DAILY) TABS Stop taking 7 days prior to surgery   Omega-3 Fatty Acids (OMEGA-3 FISH OIL) 1000 MG CAPS Stop taking 7 days prior to surgery   polyethylene glycol (GLYCOLAX) 17 GM/SCOOP powder Hold day of surgery   Probiotic Product (PROBIOTIC-10 PO) Stop taking 7 days prior to surgery   risedronate (ACTONEL) 35 mg tablet Hold day of surgery   zinc gluconate 50 mg tablet Stop taking 7 days prior to surgery  Pt denies fever, sob, sore throat and cough  Pt verbalized understanding of shower and med instructions  Pt instructed to stop nsaids and supplements one week prior to surgery

## 2023-04-07 ENCOUNTER — TELEPHONE (OUTPATIENT)
Dept: FAMILY MEDICINE CLINIC | Facility: CLINIC | Age: 63
End: 2023-04-07

## 2023-04-07 NOTE — TELEPHONE ENCOUNTER
----- Message from Ganesh Parsons DO sent at 4/7/2023  1:41 PM EDT -----  Please call patient to inform her that the lesion from her back was benign    Left detailed message to patient

## 2023-04-26 ENCOUNTER — HOSPITAL ENCOUNTER (INPATIENT)
Facility: HOSPITAL | Age: 63
LOS: 2 days | Discharge: HOME | End: 2023-04-28
Attending: THORACIC SURGERY (CARDIOTHORACIC VASCULAR SURGERY) | Admitting: THORACIC SURGERY (CARDIOTHORACIC VASCULAR SURGERY)

## 2023-04-26 ENCOUNTER — APPOINTMENT (OUTPATIENT)
Dept: RADIOLOGY | Facility: HOSPITAL | Age: 63
End: 2023-04-26

## 2023-04-26 ENCOUNTER — ANESTHESIA (OUTPATIENT)
Dept: PERIOP | Facility: HOSPITAL | Age: 63
End: 2023-04-26

## 2023-04-26 DIAGNOSIS — R91.8 MASS OF LOWER LOBE OF LEFT LUNG: ICD-10-CM

## 2023-04-26 LAB
ABO GROUP BLD: NORMAL
ANION GAP SERPL CALCULATED.3IONS-SCNC: 5 MMOL/L (ref 4–13)
BASE EXCESS BLDA CALC-SCNC: 0 MMOL/L (ref -2–3)
BUN SERPL-MCNC: 16 MG/DL (ref 5–25)
CA-I BLD-SCNC: 1.15 MMOL/L (ref 1.12–1.32)
CALCIUM SERPL-MCNC: 8.3 MG/DL (ref 8.3–10.1)
CHLORIDE SERPL-SCNC: 113 MMOL/L (ref 96–108)
CO2 SERPL-SCNC: 24 MMOL/L (ref 21–32)
CREAT SERPL-MCNC: 0.82 MG/DL (ref 0.6–1.3)
ERYTHROCYTE [DISTWIDTH] IN BLOOD BY AUTOMATED COUNT: 14.6 % (ref 11.6–15.1)
GFR SERPL CREATININE-BSD FRML MDRD: 76 ML/MIN/1.73SQ M
GLUCOSE SERPL-MCNC: 105 MG/DL (ref 65–140)
GLUCOSE SERPL-MCNC: 128 MG/DL (ref 65–140)
HCO3 BLDA-SCNC: 23.9 MMOL/L (ref 22–28)
HCT VFR BLD AUTO: 39.3 % (ref 34.8–46.1)
HCT VFR BLD CALC: 35 % (ref 34.8–46.1)
HGB BLD-MCNC: 13 G/DL (ref 11.5–15.4)
HGB BLDA-MCNC: 11.9 G/DL (ref 11.5–15.4)
MAGNESIUM SERPL-MCNC: 2.2 MG/DL (ref 1.6–2.6)
MCH RBC QN AUTO: 30.4 PG (ref 26.8–34.3)
MCHC RBC AUTO-ENTMCNC: 33.1 G/DL (ref 31.4–37.4)
MCV RBC AUTO: 92 FL (ref 82–98)
PCO2 BLD: 25 MMOL/L (ref 21–32)
PCO2 BLD: 37.2 MM HG (ref 36–44)
PH BLD: 7.42 [PH] (ref 7.35–7.45)
PLATELET # BLD AUTO: 271 THOUSANDS/UL (ref 149–390)
PMV BLD AUTO: 10 FL (ref 8.9–12.7)
PO2 BLD: 155 MM HG (ref 75–129)
POTASSIUM BLD-SCNC: 3.6 MMOL/L (ref 3.5–5.3)
POTASSIUM SERPL-SCNC: 3.7 MMOL/L (ref 3.5–5.3)
RBC # BLD AUTO: 4.28 MILLION/UL (ref 3.81–5.12)
RH BLD: POSITIVE
SAO2 % BLD FROM PO2: 99 % (ref 60–85)
SODIUM BLD-SCNC: 143 MMOL/L (ref 136–145)
SODIUM SERPL-SCNC: 142 MMOL/L (ref 135–147)
SPECIMEN SOURCE: ABNORMAL
WBC # BLD AUTO: 8 THOUSAND/UL (ref 4.31–10.16)

## 2023-04-26 PROCEDURE — 0BJ08ZZ INSPECTION OF TRACHEOBRONCHIAL TREE, VIA NATURAL OR ARTIFICIAL OPENING ENDOSCOPIC: ICD-10-PCS | Performed by: THORACIC SURGERY (CARDIOTHORACIC VASCULAR SURGERY)

## 2023-04-26 PROCEDURE — 0BTJ4ZZ RESECTION OF LEFT LOWER LUNG LOBE, PERCUTANEOUS ENDOSCOPIC APPROACH: ICD-10-PCS | Performed by: THORACIC SURGERY (CARDIOTHORACIC VASCULAR SURGERY)

## 2023-04-26 PROCEDURE — 07B74ZX EXCISION OF THORAX LYMPHATIC, PERCUTANEOUS ENDOSCOPIC APPROACH, DIAGNOSTIC: ICD-10-PCS | Performed by: THORACIC SURGERY (CARDIOTHORACIC VASCULAR SURGERY)

## 2023-04-26 RX ORDER — SODIUM CHLORIDE, SODIUM LACTATE, POTASSIUM CHLORIDE, CALCIUM CHLORIDE 600; 310; 30; 20 MG/100ML; MG/100ML; MG/100ML; MG/100ML
INJECTION, SOLUTION INTRAVENOUS CONTINUOUS PRN
Status: DISCONTINUED | OUTPATIENT
Start: 2023-04-26 | End: 2023-04-26

## 2023-04-26 RX ORDER — CEFAZOLIN SODIUM 2 G/50ML
2000 SOLUTION INTRAVENOUS ONCE
Status: COMPLETED | OUTPATIENT
Start: 2023-04-26 | End: 2023-04-26

## 2023-04-26 RX ORDER — ONDANSETRON 2 MG/ML
4 INJECTION INTRAMUSCULAR; INTRAVENOUS ONCE AS NEEDED
Status: DISCONTINUED | OUTPATIENT
Start: 2023-04-26 | End: 2023-04-26

## 2023-04-26 RX ORDER — ASPIRIN 81 MG/1
81 TABLET, CHEWABLE ORAL DAILY
Status: DISCONTINUED | OUTPATIENT
Start: 2023-04-27 | End: 2023-04-28 | Stop reason: HOSPADM

## 2023-04-26 RX ORDER — DEXAMETHASONE SODIUM PHOSPHATE 10 MG/ML
INJECTION, SOLUTION INTRAMUSCULAR; INTRAVENOUS AS NEEDED
Status: DISCONTINUED | OUTPATIENT
Start: 2023-04-26 | End: 2023-04-26

## 2023-04-26 RX ORDER — ONDANSETRON 2 MG/ML
INJECTION INTRAMUSCULAR; INTRAVENOUS AS NEEDED
Status: DISCONTINUED | OUTPATIENT
Start: 2023-04-26 | End: 2023-04-26

## 2023-04-26 RX ORDER — POLYETHYLENE GLYCOL 3350 17 G/17G
17 POWDER, FOR SOLUTION ORAL DAILY
Status: DISCONTINUED | OUTPATIENT
Start: 2023-04-27 | End: 2023-04-28 | Stop reason: HOSPADM

## 2023-04-26 RX ORDER — ROCURONIUM BROMIDE 10 MG/ML
INJECTION, SOLUTION INTRAVENOUS AS NEEDED
Status: DISCONTINUED | OUTPATIENT
Start: 2023-04-26 | End: 2023-04-26

## 2023-04-26 RX ORDER — FENTANYL CITRATE/PF 50 MCG/ML
25 SYRINGE (ML) INJECTION
Status: DISCONTINUED | OUTPATIENT
Start: 2023-04-26 | End: 2023-04-26

## 2023-04-26 RX ORDER — FENTANYL CITRATE/PF 50 MCG/ML
50 SYRINGE (ML) INJECTION
Status: DISCONTINUED | OUTPATIENT
Start: 2023-04-26 | End: 2023-04-26

## 2023-04-26 RX ORDER — SODIUM CHLORIDE, SODIUM LACTATE, POTASSIUM CHLORIDE, CALCIUM CHLORIDE 600; 310; 30; 20 MG/100ML; MG/100ML; MG/100ML; MG/100ML
125 INJECTION, SOLUTION INTRAVENOUS CONTINUOUS
Status: DISCONTINUED | OUTPATIENT
Start: 2023-04-26 | End: 2023-04-26

## 2023-04-26 RX ORDER — ACETAMINOPHEN 325 MG/1
975 TABLET ORAL EVERY 6 HOURS
Status: DISCONTINUED | OUTPATIENT
Start: 2023-04-26 | End: 2023-04-28 | Stop reason: HOSPADM

## 2023-04-26 RX ORDER — SODIUM CHLORIDE 9 MG/ML
INJECTION, SOLUTION INTRAVENOUS CONTINUOUS PRN
Status: DISCONTINUED | OUTPATIENT
Start: 2023-04-26 | End: 2023-04-26

## 2023-04-26 RX ORDER — CELECOXIB 200 MG/1
200 CAPSULE ORAL ONCE
Status: DISCONTINUED | OUTPATIENT
Start: 2023-04-26 | End: 2023-04-26

## 2023-04-26 RX ORDER — EPHEDRINE SULFATE 50 MG/ML
INJECTION INTRAVENOUS AS NEEDED
Status: DISCONTINUED | OUTPATIENT
Start: 2023-04-26 | End: 2023-04-26

## 2023-04-26 RX ORDER — ONDANSETRON 2 MG/ML
4 INJECTION INTRAMUSCULAR; INTRAVENOUS EVERY 6 HOURS PRN
Status: DISCONTINUED | OUTPATIENT
Start: 2023-04-26 | End: 2023-04-28 | Stop reason: HOSPADM

## 2023-04-26 RX ORDER — GABAPENTIN 300 MG/1
600 CAPSULE ORAL ONCE
Status: COMPLETED | OUTPATIENT
Start: 2023-04-26 | End: 2023-04-26

## 2023-04-26 RX ORDER — HYDROMORPHONE HCL/PF 1 MG/ML
0.5 SYRINGE (ML) INJECTION
Status: DISCONTINUED | OUTPATIENT
Start: 2023-04-26 | End: 2023-04-26

## 2023-04-26 RX ORDER — SENNOSIDES 8.6 MG
1 TABLET ORAL DAILY
Status: DISCONTINUED | OUTPATIENT
Start: 2023-04-27 | End: 2023-04-28 | Stop reason: HOSPADM

## 2023-04-26 RX ORDER — HYDROMORPHONE HCL/PF 1 MG/ML
0.5 SYRINGE (ML) INJECTION
Status: DISCONTINUED | OUTPATIENT
Start: 2023-04-26 | End: 2023-04-28 | Stop reason: HOSPADM

## 2023-04-26 RX ORDER — DOCUSATE SODIUM 100 MG/1
100 CAPSULE, LIQUID FILLED ORAL 2 TIMES DAILY
Status: DISCONTINUED | OUTPATIENT
Start: 2023-04-26 | End: 2023-04-28 | Stop reason: HOSPADM

## 2023-04-26 RX ORDER — OXYCODONE HYDROCHLORIDE 5 MG/1
5 TABLET ORAL EVERY 4 HOURS PRN
Status: DISCONTINUED | OUTPATIENT
Start: 2023-04-26 | End: 2023-04-28 | Stop reason: HOSPADM

## 2023-04-26 RX ORDER — DIPHENHYDRAMINE HYDROCHLORIDE 50 MG/ML
12.5 INJECTION INTRAMUSCULAR; INTRAVENOUS ONCE AS NEEDED
Status: DISCONTINUED | OUTPATIENT
Start: 2023-04-26 | End: 2023-04-26

## 2023-04-26 RX ORDER — LIDOCAINE HYDROCHLORIDE 10 MG/ML
INJECTION, SOLUTION EPIDURAL; INFILTRATION; INTRACAUDAL; PERINEURAL AS NEEDED
Status: DISCONTINUED | OUTPATIENT
Start: 2023-04-26 | End: 2023-04-26

## 2023-04-26 RX ORDER — LANOLIN ALCOHOL/MO/W.PET/CERES
3 CREAM (GRAM) TOPICAL
Status: DISCONTINUED | OUTPATIENT
Start: 2023-04-26 | End: 2023-04-28 | Stop reason: HOSPADM

## 2023-04-26 RX ORDER — SODIUM CHLORIDE, SODIUM LACTATE, POTASSIUM CHLORIDE, CALCIUM CHLORIDE 600; 310; 30; 20 MG/100ML; MG/100ML; MG/100ML; MG/100ML
60 INJECTION, SOLUTION INTRAVENOUS CONTINUOUS
Status: DISCONTINUED | OUTPATIENT
Start: 2023-04-26 | End: 2023-04-27

## 2023-04-26 RX ORDER — GABAPENTIN 300 MG/1
300 CAPSULE ORAL 3 TIMES DAILY
Status: DISCONTINUED | OUTPATIENT
Start: 2023-04-26 | End: 2023-04-28 | Stop reason: HOSPADM

## 2023-04-26 RX ORDER — BISACODYL 5 MG/1
10 TABLET, DELAYED RELEASE ORAL DAILY PRN
Status: DISCONTINUED | OUTPATIENT
Start: 2023-04-26 | End: 2023-04-28 | Stop reason: HOSPADM

## 2023-04-26 RX ORDER — FENTANYL CITRATE 50 UG/ML
INJECTION, SOLUTION INTRAMUSCULAR; INTRAVENOUS AS NEEDED
Status: DISCONTINUED | OUTPATIENT
Start: 2023-04-26 | End: 2023-04-26

## 2023-04-26 RX ORDER — PROPOFOL 10 MG/ML
INJECTION, EMULSION INTRAVENOUS AS NEEDED
Status: DISCONTINUED | OUTPATIENT
Start: 2023-04-26 | End: 2023-04-26

## 2023-04-26 RX ORDER — MIDAZOLAM HYDROCHLORIDE 2 MG/2ML
INJECTION, SOLUTION INTRAMUSCULAR; INTRAVENOUS AS NEEDED
Status: DISCONTINUED | OUTPATIENT
Start: 2023-04-26 | End: 2023-04-26

## 2023-04-26 RX ORDER — POTASSIUM CHLORIDE 14.9 MG/ML
20 INJECTION INTRAVENOUS ONCE
Status: DISCONTINUED | OUTPATIENT
Start: 2023-04-26 | End: 2023-04-26

## 2023-04-26 RX ORDER — PANTOPRAZOLE SODIUM 40 MG/1
40 TABLET, DELAYED RELEASE ORAL
Status: DISCONTINUED | OUTPATIENT
Start: 2023-04-27 | End: 2023-04-28 | Stop reason: HOSPADM

## 2023-04-26 RX ORDER — ACETAMINOPHEN 325 MG/1
975 TABLET ORAL ONCE
Status: COMPLETED | OUTPATIENT
Start: 2023-04-26 | End: 2023-04-26

## 2023-04-26 RX ORDER — HYDROMORPHONE HCL/PF 1 MG/ML
SYRINGE (ML) INJECTION AS NEEDED
Status: DISCONTINUED | OUTPATIENT
Start: 2023-04-26 | End: 2023-04-26

## 2023-04-26 RX ORDER — PROMETHAZINE HYDROCHLORIDE 25 MG/ML
25 INJECTION, SOLUTION INTRAMUSCULAR; INTRAVENOUS ONCE AS NEEDED
Status: DISCONTINUED | OUTPATIENT
Start: 2023-04-26 | End: 2023-04-26

## 2023-04-26 RX ORDER — ENOXAPARIN SODIUM 100 MG/ML
40 INJECTION SUBCUTANEOUS DAILY
Status: DISCONTINUED | OUTPATIENT
Start: 2023-04-27 | End: 2023-04-28 | Stop reason: HOSPADM

## 2023-04-26 RX ORDER — HEPARIN SODIUM 5000 [USP'U]/ML
5000 INJECTION, SOLUTION INTRAVENOUS; SUBCUTANEOUS
Status: COMPLETED | OUTPATIENT
Start: 2023-04-26 | End: 2023-04-26

## 2023-04-26 RX ORDER — POTASSIUM CHLORIDE 20 MEQ/1
20 TABLET, EXTENDED RELEASE ORAL ONCE
Status: COMPLETED | OUTPATIENT
Start: 2023-04-26 | End: 2023-04-26

## 2023-04-26 RX ADMIN — ROCURONIUM BROMIDE 10 MG: 50 INJECTION, SOLUTION INTRAVENOUS at 16:11

## 2023-04-26 RX ADMIN — ROCURONIUM BROMIDE 50 MG: 50 INJECTION, SOLUTION INTRAVENOUS at 14:54

## 2023-04-26 RX ADMIN — FENTANYL CITRATE 25 MCG: 50 INJECTION INTRAMUSCULAR; INTRAVENOUS at 19:00

## 2023-04-26 RX ADMIN — MIDAZOLAM 2 MG: 1 INJECTION INTRAMUSCULAR; INTRAVENOUS at 14:46

## 2023-04-26 RX ADMIN — EPHEDRINE SULFATE 10 MG: 50 INJECTION INTRAVENOUS at 15:44

## 2023-04-26 RX ADMIN — GABAPENTIN 300 MG: 300 CAPSULE ORAL at 21:34

## 2023-04-26 RX ADMIN — HYDROMORPHONE HYDROCHLORIDE 0.5 MG: 1 INJECTION, SOLUTION INTRAMUSCULAR; INTRAVENOUS; SUBCUTANEOUS at 15:31

## 2023-04-26 RX ADMIN — EPHEDRINE SULFATE 5 MG: 50 INJECTION INTRAVENOUS at 15:18

## 2023-04-26 RX ADMIN — MELATONIN 3 MG: at 23:19

## 2023-04-26 RX ADMIN — SODIUM CHLORIDE, SODIUM LACTATE, POTASSIUM CHLORIDE, AND CALCIUM CHLORIDE 125 ML/HR: .6; .31; .03; .02 INJECTION, SOLUTION INTRAVENOUS at 13:46

## 2023-04-26 RX ADMIN — ONDANSETRON 4 MG: 2 INJECTION INTRAMUSCULAR; INTRAVENOUS at 17:17

## 2023-04-26 RX ADMIN — CEFAZOLIN SODIUM 2000 MG: 2 SOLUTION INTRAVENOUS at 15:20

## 2023-04-26 RX ADMIN — FENTANYL CITRATE 100 MCG: 50 INJECTION, SOLUTION INTRAMUSCULAR; INTRAVENOUS at 14:53

## 2023-04-26 RX ADMIN — POTASSIUM CHLORIDE 20 MEQ: 14.9 INJECTION, SOLUTION INTRAVENOUS at 22:23

## 2023-04-26 RX ADMIN — SODIUM CHLORIDE: 9 INJECTION, SOLUTION INTRAVENOUS at 14:57

## 2023-04-26 RX ADMIN — HYDROMORPHONE HYDROCHLORIDE 0.5 MG: 1 INJECTION, SOLUTION INTRAMUSCULAR; INTRAVENOUS; SUBCUTANEOUS at 18:16

## 2023-04-26 RX ADMIN — LIDOCAINE HYDROCHLORIDE 50 MG: 10 INJECTION, SOLUTION EPIDURAL; INFILTRATION; INTRACAUDAL; PERINEURAL at 14:53

## 2023-04-26 RX ADMIN — SUGAMMADEX 350 MG: 100 INJECTION, SOLUTION INTRAVENOUS at 17:52

## 2023-04-26 RX ADMIN — GABAPENTIN 600 MG: 300 CAPSULE ORAL at 10:46

## 2023-04-26 RX ADMIN — SODIUM CHLORIDE, SODIUM LACTATE, POTASSIUM CHLORIDE, AND CALCIUM CHLORIDE: .6; .31; .03; .02 INJECTION, SOLUTION INTRAVENOUS at 14:46

## 2023-04-26 RX ADMIN — PHENYLEPHRINE HYDROCHLORIDE 30 MCG/MIN: 10 INJECTION INTRAVENOUS at 15:17

## 2023-04-26 RX ADMIN — ACETAMINOPHEN 975 MG: 325 TABLET ORAL at 21:34

## 2023-04-26 RX ADMIN — ROCURONIUM BROMIDE 20 MG: 50 INJECTION, SOLUTION INTRAVENOUS at 15:40

## 2023-04-26 RX ADMIN — SODIUM CHLORIDE, SODIUM LACTATE, POTASSIUM CHLORIDE, AND CALCIUM CHLORIDE 60 ML/HR: .6; .31; .03; .02 INJECTION, SOLUTION INTRAVENOUS at 21:31

## 2023-04-26 RX ADMIN — SODIUM CHLORIDE, SODIUM LACTATE, POTASSIUM CHLORIDE, AND CALCIUM CHLORIDE 125 ML/HR: .6; .31; .03; .02 INJECTION, SOLUTION INTRAVENOUS at 11:05

## 2023-04-26 RX ADMIN — ACETAMINOPHEN 975 MG: 325 TABLET ORAL at 10:45

## 2023-04-26 RX ADMIN — FENTANYL CITRATE 25 MCG: 50 INJECTION INTRAMUSCULAR; INTRAVENOUS at 18:56

## 2023-04-26 RX ADMIN — DEXAMETHASONE SODIUM PHOSPHATE 10 MG: 10 INJECTION, SOLUTION INTRAMUSCULAR; INTRAVENOUS at 14:54

## 2023-04-26 RX ADMIN — SODIUM CHLORIDE, SODIUM LACTATE, POTASSIUM CHLORIDE, AND CALCIUM CHLORIDE: .6; .31; .03; .02 INJECTION, SOLUTION INTRAVENOUS at 17:33

## 2023-04-26 RX ADMIN — EPHEDRINE SULFATE 5 MG: 50 INJECTION INTRAVENOUS at 17:17

## 2023-04-26 RX ADMIN — POTASSIUM CHLORIDE 20 MEQ: 1500 TABLET, EXTENDED RELEASE ORAL at 23:56

## 2023-04-26 RX ADMIN — PROPOFOL 180 MG: 10 INJECTION, EMULSION INTRAVENOUS at 14:53

## 2023-04-26 RX ADMIN — ROCURONIUM BROMIDE 20 MG: 50 INJECTION, SOLUTION INTRAVENOUS at 16:37

## 2023-04-26 RX ADMIN — HEPARIN SODIUM 5000 UNITS: 5000 INJECTION INTRAVENOUS; SUBCUTANEOUS at 10:47

## 2023-04-26 RX ADMIN — DOCUSATE SODIUM 100 MG: 100 CAPSULE, LIQUID FILLED ORAL at 21:34

## 2023-04-26 RX ADMIN — ROCURONIUM BROMIDE 20 MG: 50 INJECTION, SOLUTION INTRAVENOUS at 15:28

## 2023-04-26 NOTE — H&P
H&P - Thoracic Surgery  Shila Farfan 60 y o  female MRN: [id number]  Unit/Bed#: [facility] Encounter: [id number]      Assessment/Plan      Assessment:  LLL pulmonary nodule  Plan:  VATS lower lobe wedge resection with possible completion lobectomy    History of Present Illness     HPI: Shila Farfan is a 60y o  year old female who presents with a left lower lobe pulmonary nodule  She is doing well today and has no new complaints since the last office visit    From [facility] 3/27/23:  Rafa Weaver is a 61 yo female who we evaluated on 3/2/23 for a left lower lobe mixed nodule with a solid component  They read the solid component as scar/ atelectasis  Dr Robert Jett recommended a PET and PFT's  She had a DVT after her pregnancy about 37 yrs ago       She returns today with PFT's from 3/9/23 which reveals a FVC of 3 05%, 88%, FEV1 2 54L of 94%, and a DLCO of 90% predicted  PET from 3/16/23 revealed a SUV of 1 1 with the 2 cm left lower lobe nodule, which is same as background activity  No hilar or mediastinal uptake  Radiology still feels it could be on the adenocarcinoma spectrum and is recommending a repeat CT scan in 6 months  There was no other FDG uptake       On discussion,  She is feeling pretty well  She does have a slight throat tickle, but was painting this weekend  She otherwise denies fever, chills, cough, shortness of breath, chest pain, or new bone pains  She is a lifetime non smoker  Review of Systems   Constitutional: Negative for chills, fatigue, fever and unexpected weight change  HENT: Negative  Eyes: Negative  Negative for visual disturbance  Respiratory: Negative for cough, shortness of breath and stridor  Cardiovascular: Negative for chest pain  Gastrointestinal: Negative  Endocrine: Negative  Genitourinary: Negative  Musculoskeletal: Negative  Skin: Negative  Neurological: Negative for dizziness, light-headedness and headaches  Hematological: Negative for adenopathy  Psychiatric/Behavioral: Negative            Historical Information   Past Medical History:   Diagnosis Date    Actinic keratosis     last assessed - [id number]    Anemia     BRCA1 negative     neg results    Breast lump     last assessed - [id number]    Chest tightness or pressure     Resolved - [id number]    DVT (deep vein thrombosis) in pregnancy     Ground glass opacity present on imaging of lung     Shoulder pain, left     last assessed - [id number]     Past Surgical History:   Procedure Laterality Date    BREAST BIOPSY Right     neg results     SECTION      x 2    COLONOSCOPY      Complete Colonoscopy    TONSILLECTOMY       Social History   Social History     Substance and Sexual Activity   Alcohol Use Yes    Alcohol/week: 1 0 standard drink    Types: 1 Glasses of wine per week     Social History     Substance and Sexual Activity   Drug Use Never     Social History     Tobacco Use   Smoking Status Never   Smokeless Tobacco Never     E-Cigarette/Vaping    E-Cigarette Use Never User      E-Cigarette/Vaping Substances    Nicotine No     THC No     CBD No     Flavoring No     Other No     Unknown No      Family History   Problem Relation Age of Onset    Anxiety disorder Mother     Breast cancer Mother         in     Pancreatic cancer Mother 80    Parkinsonism Father     Hypertension Father     Prostate cancer Father         age unknown    No Known Problems Maternal Grandmother     Coronary artery disease Maternal Grandfather     Breast cancer Paternal Grandmother         age unknown   Deri Bump Stroke Paternal Grandmother    Deri Bump Depression Daughter    Deri Bump Breast cancer Maternal Aunt         age unknown    Pancreatic cancer Maternal Aunt 80    No Known Problems Paternal Grandfather     Alcohol abuse Neg Hx     Substance Abuse Neg Hx        Meds/Allergies      Current Meds:   Current Facility-Administered Medications   Medication Dose Route Frequency    ceFAZolin (ANCEF) IVPB (premix in dextrose) 2,000 mg 50 mL  2,000 mg Intravenous Once    lactated ringers infusion  125 mL/hr Intravenous Continuous       Allergies   Allergen Reactions    Other Allergic Rhinitis     Seasonal        Objective   No intake or output data in the 24 hours ending 04/26/23 1337    Invasive Devices     Peripheral Intravenous Line  Duration           Peripheral IV 04/26/23 Dorsal (posterior); Right Hand <1 day                Physical Exam  Vitals and nursing note reviewed  Constitutional:       General: She is not in acute distress  Appearance: Normal appearance  She is well-developed  HENT:      Head: Normocephalic and atraumatic  Nose: Nose normal    Eyes:      Conjunctiva/sclera: Conjunctivae normal    Cardiovascular:      Rate and Rhythm: Normal rate and regular rhythm  Heart sounds: Normal heart sounds  No murmur heard  Pulmonary:      Effort: Pulmonary effort is normal  No respiratory distress  Breath sounds: Normal breath sounds  Abdominal:      Palpations: Abdomen is soft  Tenderness: There is no abdominal tenderness  Musculoskeletal:         General: No swelling  Cervical back: Neck supple  Skin:     General: Skin is warm and dry  Capillary Refill: Capillary refill takes less than 2 seconds  Neurological:      Mental Status: She is alert     Psychiatric:         Mood and Affect: Mood normal          Code Status: No Order  Advance Directive and Living Will:      Power of :    POLST:

## 2023-04-26 NOTE — ANESTHESIA PREPROCEDURE EVALUATION
Procedure:  THORACOSCOPY VIDEO ASSISTED SURGERY (VATS) (Left: Chest)  left thoracoscopic lower lobe wedge resection (Left: Chest)  possible completion lower lobectomy (Left: Chest)  BRONCHOSCOPY FLEXIBLE (Bronchus)    Relevant Problems   CARDIO   (+) DVT (deep vein thrombosis) in pregnancy   (+) Hypercholesterolemia      NEURO/PSYCH   (+) Anxiety      Postmenopause atrophic vaginitis   BMI 27 0-27 9,adult   Hypercholesterolemia   Anxiety   DVT (deep vein thrombosis) in pregnancy   Vitamin D deficiency   Family history of pancreatic cancer   Mass of lower lobe of left lung       Physical Exam    Airway    Mallampati score: III  TM Distance: >3 FB  Neck ROM: full     Dental       Cardiovascular  Cardiovascular exam normal    Pulmonary  Pulmonary exam normal     Other Findings        Anesthesia Plan  ASA Score- 3     Anesthesia Type- general with ASA Monitors  Additional Monitors: arterial line  Airway Plan:           Plan Factors-    Chart reviewed  EKG reviewed  Imaging results reviewed  Existing labs reviewed  Patient summary reviewed  Patient is not a current smoker  Induction- intravenous  Postoperative Plan- Plan for postoperative opioid use  Planned trial extubation    Informed Consent- Anesthetic plan and risks discussed with patient  I personally reviewed this patient with the CRNA  Discussed and agreed on the Anesthesia Plan with the CRNA  José Miguel Cotton

## 2023-04-26 NOTE — ANESTHESIA POSTPROCEDURE EVALUATION
Post-Op Assessment Note    CV Status:  Stable  Pain Score: 0    Pain management: adequate     Mental Status:  Awake   Hydration Status:  Stable   PONV Controlled:  None   Airway Patency:  Patent      Post Op Vitals Reviewed: Yes      Staff: Anesthesiologist, CRNA         No notable events documented      /74 (04/26/23 1819)    Temp 97 8 °F (36 6 °C) (04/26/23 1819)    Pulse 72 (04/26/23 1819)   Resp 12 (04/26/23 1819)    SpO2 100 % (04/26/23 1819)

## 2023-04-26 NOTE — OP NOTE
OPERATIVE REPORT  PATIENT NAME: Agata Theodore    :  1960  MRN: [id number]  Pt Location: BE OR ROOM 15    SURGERY DATE: 2023    Surgeon(s) and Role:     * Chrissy Obrien MD - Primary    Preop Diagnosis:  Mass of lower lobe of left lung [R91 8]    Post-Op Diagnosis Codes:     * Mass of lower lobe of left lung [R91 8]    Procedure(s):  Left - THORACOSCOPY VIDEO ASSISTED SURGERY (VATS)  Left - left thoracoscopic lower lobe wedge resection  Left - completion lower lobectomy  BRONCHOSCOPY FLEXIBLE    Specimen(s):  ID Type Source Tests Collected by Time Destination   1 : Left lower lobe wedge  freeze nodule Tissue Lung, Left Lower Lobe TISSUE EXAM Chrissy Obrien MD 2023 1537    2 : Level 9 Tissue Lymph Node TISSUE EXAM Chrissy Obrien MD 2023 1605    3 : Level 10 Tissue Lymph Node TISSUE EXAM Chrissy Obrien MD 2023 1623    4 : Level 11 Tissue Lymph Node TISSUE EXAM Chrissy Obrien MD 2023 1628    5 : Level 10 posterior Tissue Lymph Node TISSUE EXAM Chrissy Obrien MD 2023 1634    6 :  completion left lower lobe  Tissue Lung, Left Lower Lobe TISSUE EXAM Chrissy Obrien MD 2023 1658    7 : Level 5  Tissue Lymph Node TISSUE EXAM Chrissy Obrien MD 2023 1701        Estimated Blood Loss:   Minimal    Drains:  Chest Tube 1 Left Midaxillary 24 Fr  (Active)   Number of days: 0       Anesthesia Type:   General    Operative Indications: Mass of lower lobe of left lung [R91 8]    Operative Findings:  Nodule removed by wedge  Consistent with NSCLC  Completion lobectomy performed    Complications:   None    Procedure and Technique:  INDICATION:       Agata Theodore is a 60 y o  female who presented with a LLL pulmonary nodule  She was worked up and deemed an appropriate surgical candidate  The potential risks and benefits of the surgery were explained to the patient and she elected to proceed   All of her questions were answered  DETAILS OF PROCEDURE:  The patient was correctly identified by name and medical record number in the holding area and brought to the operative suite, where she was placed supine on the operative table  After satisfactory induction of general endotracheal anesthesia, a flexible bronchoscope was passed through the endotracheal tube visualizing the distal trachea, hayde, right and left main stem bronchi, including all of the primary and secondary divisions  No endobronchial tumor was identified  No suspicion or identified risk for TB or other airborne infectious disease; bronchoscopy procedure being performed for diagnostic purposes  Flexible bronchoscopy was then terminated and the scope was withdrawn  The patient was positioned in the left lateral decubitus position, prepped and draped in the usual fashion  A time-out was performed to confirm procedure and laterality  A 1 5cm incision was made in the 8th intercostal space, in the posterior axillary line  This was carried down to the pleura and the thoracic cavity was entered  The thoracoscope was introduced and the cavity explored  An additional port was placed in the 7th intercostal space in line with the scapula tip  An access port was then created in the 4th intercostal space in the anterior axillary line  An intercostal nerve block was then applied using Exparel into rib spaces 3-10  The entire lung was then mobilized using a combination of electrocautery and sharp dissection to divide adhesions to the mediastinum  The nodule was identified in the left lower lobe and confirmed with palpation  This was wedge resected using serial firings of a tissue load stapler  The frozen was consistent with a NSCLC  The decision was made to proceed with a completion lobectomy  The inferior pulmonary ligament was taken down using electrocautery  Level 9 lymph nodes were dissected free and sent for pathology   The overlying pleura was incised up the posterior hilum  The lung was then retracted superiorly and posteriorly, exposing the inferior mediastinal structures  The inferior pulmonary vein was dissected free and encircled  Using a vascular load stapler, the inferior pulmonary vein was then divided  This then exposed the bronchus  The left lower lobe bronchus was dissected free and encircled  A thick load stapler was then placed around the left lower lobe bronchus  The clarisa tissue in station 10 and 11 around the artery and bronchus was dissected and removed and sent for pathology  After the bronchus was taken, the basilar and superior segment pulmonary artery branches were readily identified  These were circumferentially dissected and then ligated and divided with a vascular load stapler  After this division, the lobectomy was completed with serial firings of the tissue load stapler  The lobe was placed in a Lap-Sac and removed from the thoracic cavity  The specimen was sent off the table  The resection bed and all dissected areas were examined and hemostasis was deemed as adequate  We turned our attention to a mediastinal lymph node dissection  Level 5 lymph nodes were dissected free and sent off the table to pathology  No nodes were identified in level 6 or 7  Surgicel was placed into the lymph node resection beds to provide hemostasis  A posterior apical 24F chest tube was placed and secured to the skin with 0 Prolene sutures  The lung was re-expanded under direct visualization  The wounds were closed in layers using Vicryl suture followed by subcuticular monofilament suture  Dermabond was applied to each wound  At the end of the procedure, the instrument, sponge and needle counts were confirmed to be correct x2  The patient tolerated the procedure well and was delivered to the PACU  I was present for the entire procedure      Patient Disposition:  PACU  and extubated and stable        SIGNATURE: Hang Felix MD  DATE: April 26, 2023  TIME: 5:19 PM

## 2023-04-27 LAB
ANION GAP SERPL CALCULATED.3IONS-SCNC: 5 MMOL/L (ref 4–13)
BUN SERPL-MCNC: 14 MG/DL (ref 5–25)
CALCIUM SERPL-MCNC: 8.5 MG/DL (ref 8.3–10.1)
CHLORIDE SERPL-SCNC: 106 MMOL/L (ref 96–108)
CO2 SERPL-SCNC: 26 MMOL/L (ref 21–32)
CREAT SERPL-MCNC: 0.84 MG/DL (ref 0.6–1.3)
ERYTHROCYTE [DISTWIDTH] IN BLOOD BY AUTOMATED COUNT: 14.6 % (ref 11.6–15.1)
GFR SERPL CREATININE-BSD FRML MDRD: 74 ML/MIN/1.73SQ M
GLUCOSE SERPL-MCNC: 192 MG/DL (ref 65–140)
HCT VFR BLD AUTO: 37.8 % (ref 34.8–46.1)
HGB BLD-MCNC: 12.6 G/DL (ref 11.5–15.4)
MAGNESIUM SERPL-MCNC: 2.1 MG/DL (ref 1.6–2.6)
MCH RBC QN AUTO: 30.7 PG (ref 26.8–34.3)
MCHC RBC AUTO-ENTMCNC: 33.3 G/DL (ref 31.4–37.4)
MCV RBC AUTO: 92 FL (ref 82–98)
PLATELET # BLD AUTO: 291 THOUSANDS/UL (ref 149–390)
PMV BLD AUTO: 10.2 FL (ref 8.9–12.7)
POTASSIUM SERPL-SCNC: 4.2 MMOL/L (ref 3.5–5.3)
RBC # BLD AUTO: 4.1 MILLION/UL (ref 3.81–5.12)
SODIUM SERPL-SCNC: 137 MMOL/L (ref 135–147)
WBC # BLD AUTO: 10.38 THOUSAND/UL (ref 4.31–10.16)

## 2023-04-27 RX ORDER — ALBUTEROL SULFATE 2.5 MG/3ML
2.5 SOLUTION RESPIRATORY (INHALATION) EVERY 4 HOURS PRN
Status: DISCONTINUED | OUTPATIENT
Start: 2023-04-27 | End: 2023-04-28 | Stop reason: HOSPADM

## 2023-04-27 RX ORDER — ALBUTEROL SULFATE 2.5 MG/3ML
2.5 SOLUTION RESPIRATORY (INHALATION) ONCE
Status: DISCONTINUED | OUTPATIENT
Start: 2023-04-27 | End: 2023-04-27

## 2023-04-27 RX ORDER — IPRATROPIUM BROMIDE AND ALBUTEROL SULFATE 2.5; .5 MG/3ML; MG/3ML
3 SOLUTION RESPIRATORY (INHALATION)
Status: DISCONTINUED | OUTPATIENT
Start: 2023-04-27 | End: 2023-04-27

## 2023-04-27 RX ADMIN — GABAPENTIN 300 MG: 300 CAPSULE ORAL at 08:39

## 2023-04-27 RX ADMIN — ACETAMINOPHEN 975 MG: 325 TABLET ORAL at 23:20

## 2023-04-27 RX ADMIN — DOCUSATE SODIUM 100 MG: 100 CAPSULE, LIQUID FILLED ORAL at 08:41

## 2023-04-27 RX ADMIN — ACETAMINOPHEN 975 MG: 325 TABLET ORAL at 08:41

## 2023-04-27 RX ADMIN — ACETAMINOPHEN 975 MG: 325 TABLET ORAL at 03:19

## 2023-04-27 RX ADMIN — ALBUTEROL SULFATE 2.5 MG: 2.5 SOLUTION RESPIRATORY (INHALATION) at 13:02

## 2023-04-27 RX ADMIN — DOCUSATE SODIUM 100 MG: 100 CAPSULE, LIQUID FILLED ORAL at 17:41

## 2023-04-27 RX ADMIN — ASPIRIN 81 MG CHEWABLE TABLET 81 MG: 81 TABLET CHEWABLE at 08:39

## 2023-04-27 RX ADMIN — SENNOSIDES 8.6 MG: 8.6 TABLET, FILM COATED ORAL at 08:39

## 2023-04-27 RX ADMIN — OXYCODONE HYDROCHLORIDE 5 MG: 5 TABLET ORAL at 16:01

## 2023-04-27 RX ADMIN — PANTOPRAZOLE SODIUM 40 MG: 40 TABLET, DELAYED RELEASE ORAL at 05:26

## 2023-04-27 RX ADMIN — ENOXAPARIN SODIUM 40 MG: 40 INJECTION SUBCUTANEOUS at 08:42

## 2023-04-27 RX ADMIN — GABAPENTIN 300 MG: 300 CAPSULE ORAL at 21:04

## 2023-04-27 RX ADMIN — OXYCODONE HYDROCHLORIDE 5 MG: 5 TABLET ORAL at 23:20

## 2023-04-27 RX ADMIN — GABAPENTIN 300 MG: 300 CAPSULE ORAL at 16:01

## 2023-04-27 RX ADMIN — ACETAMINOPHEN 975 MG: 325 TABLET ORAL at 16:01

## 2023-04-27 RX ADMIN — POLYETHYLENE GLYCOL 3350 17 G: 17 POWDER, FOR SOLUTION ORAL at 08:42

## 2023-04-27 NOTE — CASE MANAGEMENT
Case Management Assessment & Discharge Planning Note    Patient name Kevin Prairie  Location [facility] 427/[facility] 819-97 MRN [id number]  : 1960 Date 2023       Current Admission Date: 2023  Current Admission Diagnosis:Mass of lower lobe of left lung   Patient Active Problem List    Diagnosis Date Noted    Mass of lower lobe of left lung 2023    Family history of pancreatic cancer 2023    Vitamin D deficiency 2023    DVT (deep vein thrombosis) in pregnancy     Anxiety 2021    Hypercholesterolemia 2020    BMI 27 0-27 9,adult 2019    Postmenopause atrophic vaginitis 2014      LOS (days): 1  Geometric Mean LOS (GMLOS) (days): 1 90  Days to GMLOS:1 1     OBJECTIVE:    Risk of Unplanned Readmission Score: 8         Current admission status: Inpatient       Preferred Pharmacy:   [address] 19 [facility] [address]  Phone: [phone] Fax: [address], [address] [place] [address] [facility] 38 210 [facility]  Phone: [phone] Fax: [phone]    Primary Care Provider: Anika Chavis DO    Primary Insurance: BLUE CROSS  Secondary Insurance:     ASSESSMENT:  [address] [facility] Representative - Spouse   Primary Phone: [phone] (Home)                              Patient Information  Admitted from[de-identified] Home  Mental Status: Alert  During Assessment patient was accompanied by: Spouse  Assessment information provided by[de-identified] Patient, Spouse  Primary Caregiver: Self  Support Systems: Spouse/significant other, Family members  What city do you live in?: [place], [address] entry access options   Select all that apply : Stairs  Number of steps to enter home : One Flight  Do the steps have railings?: No  Type of Current Residence: [address] home  Upon entering residence, is there a bedroom on the main floor (no further steps)?: Yes  Upon entering residence, is there a bathroom on the main floor (no further steps)?: Yes  In the last 12 months, was there a time when you were not able to pay the mortgage or rent on time?: No  In the last 12 months, was there a time when you did not have a steady place to sleep or slept in a shelter (including now)?: No  Homeless/housing insecurity resource given?: N/A  Living Arrangements: Lives w/ Spouse/significant other    Activities of Daily Living Prior to Admission  Functional Status: Independent  Completes ADLs independently?: Yes  Ambulates independently?: Yes  Does patient use assisted devices?: No  Does patient currently own DME?: No  Does patient have a history of Outpatient Therapy (PT/OT)?: Yes  Does the patient have a history of Short-Term Rehab?: No  Does patient have a history of HHC?: No         Patient Information Continued  Income Source: Pension/jail  Does patient have prescription coverage?: Yes  Within the past 12 months, you worried that your food would run out before you got the money to buy more : Never true  Within the past 12 months, the food you bought just didn't last and you didn't have money to get more : Never true  Food insecurity resource given?: N/A  Does patient receive dialysis treatments?: No  Does patient have a history of substance abuse?: No  Does patient have a history of Mental Health Diagnosis?: No         Means of Transportation  Means of Transport to Appts[de-identified] Drives Self  In the past 12 months, has lack of transportation kept you from medical appointments or from getting medications?: No  In the past 12 months, has lack of transportation kept you from meetings, work, or from getting things needed for daily living?: No  Was application for public transport provided?: N/A        DISCHARGE DETAILS:    Discharge planning discussed with[de-identified] Patient and spouse at bedside  Freedom of Choice: Yes     CM contacted family/caregiver?: Yes  Were Treatment Team discharge recommendations reviewed with patient/caregiver?: Yes  Did patient/caregiver verbalize understanding of patient care needs?: Yes  Were patient/caregiver advised of the risks associated with not following Treatment Team discharge recommendations?: Yes    Contacts  Patient Contacts: Annia Strange  Contact Method: In Person  Reason/Outcome: Discharge [address]         Is the patient interested in [facility] at discharge?: No    DME Referral Provided  Referral made for DME?: No (Pt declined RW recommendation  She will inform this CM if she changes her mind at discharge )    Other Referral/Resources/Interventions Provided:  Interventions: Outpatient PT  Referral Comments: Pt agreeable to OP PT at discharge -- She will call and schedule an appointment upon medical stability  Treatment Team Recommendation: Home  Discharge Destination Plan[de-identified] Home                                         Additional Comments: No CM needs anticipated  CM will remain available

## 2023-04-27 NOTE — PROGRESS NOTES
"Progress Note - Jo Martin 60 y o  female MRN: [id number]    Unit/Bed#: [facility] 427-01 Encounter: [id number]      Assessment:  Jo Martin is a 60 y o  female who presents with lung mass now s/p VATS / LL lobectomy with frozen pathology c/f [facility]    VSS afebrile ORA   recorded/12hr, voiding spontaneously  CT -8, -AL, 75cc      Plan:  Diet as tolerated  D/c IVF  Discontinue arterial line  Consider pull chest tube and obtain post-pull chest xray  DVT ppx  F/u am labs   Pain and nausea control PRN  Strict I/Os  OOB ambulation  Q1hr IS use      Subjective:   Patient seen and examined bedside  No acute complaints  Pulling 1250 on IS  ORA  No nausea or emesis  No SOB  Not OOB yet  Objective:     Vitals: Blood pressure 98/55, pulse 72, temperature 97 7 °F (36 5 °C), temperature source Oral, resp  rate 18, height 5' 7 25\" (1 708 m), weight 79 4 kg (175 lb), SpO2 96 %, not currently breastfeeding  ,Body mass index is 27 21 kg/m²  Intake/Output Summary (Last 24 hours) at 4/27/2023 0459  Last data filed at 4/26/2023 2223  Gross per 24 hour   Intake 3060 ml   Output 625 ml   Net 2435 ml       Physical Exam:   General - no acute distress, responsive and cooperative  CV - warm, regular rate  Pulm - normal work of breathing, no respiratory distress ORA, CT on left to waterseal without airleak and serosang output  Abd - soft, nondistended  Neuro - m/s grossly intact, cn grossly intact  Ext - moving all extremities       Invasive Devices     Peripheral Intravenous Line  Duration           Peripheral IV 04/26/23 Dorsal (posterior); Right Hand <1 day    Peripheral IV 04/26/23 Right Hand <1 day          Arterial Line  Duration           Arterial Line 04/26/23 Left Radial <1 day          Drain  Duration           Chest Tube 1 Left Pleural 24 Fr  <1 day                Lab, Imaging and other studies: I have personally reviewed pertinent reports      VTE Pharmacologic Prophylaxis: Enoxaparin (Lovenox)  VTE " Mechanical Prophylaxis: sequential compression device

## 2023-04-27 NOTE — UTILIZATION REVIEW
"Initial Clinical Review    Elective IP surgical procedure  Age/Sex: 60 y o  female  Surgery Date: 4/26  Procedure: Left - THORACOSCOPY VIDEO ASSISTED SURGERY (VATS)  Left - left thoracoscopic lower lobe wedge resection  Left - completion lower lobectomy  BRONCHOSCOPY FLEXIBLE  Anesthesia: General  ASA Score: 3  Operative Findings: Nodule removed by wedge  Consistent with NSCLC  Completion lobectomy performed    POD#1 Progress Note: Reports pain is well-controlled  1250mL on IS  On exam, CT to waterseal w/ serosanguinous output  Plan: DW, I&O, chest tube to water seal, incentive spirometry, Trend labs, replete electrolytes as needed  Diet as tolerated, discontinue a-line and IVF  DVT ppx  I&O, analgesics, antiemetics, encourage OOB and ambulation  Admission Orders: Date/Time/Statement:   Admission Orders (From admission, onward)     Ordered        04/26/23 1748  Inpatient Admission  Once                      Orders Placed This Encounter   Procedures    Inpatient Admission     Standing Status:   Standing     Number of Occurrences:   1     Order Specific Question:   Level of Care     Answer:   Level 1 Stepdown [13]     Order Specific Question:   Estimated length of stay     Answer:   Inpatient Only Surgery     Vital Signs: /70   Pulse 60   Temp 98 °F (36 7 °C) (Oral)   Resp 18   Ht 5' 7 25\" (1 708 m)   Wt 82 kg (180 lb 12 4 oz)   SpO2 98%   BMI 28 10 kg/m²     Pertinent Labs/Diagnostic Test Results:   XR chest portable   Final Result by Adama Palomino MD (04/27 1015)      Findings indeterminate for a small pneumothorax at the left apex with left chest tube in place                 Workstation performed: [id number]               Results from last 7 days   Lab Units 04/27/23  0509 04/26/23  1830 04/26/23  1644   WBC Thousand/uL 10 38* 8 00  --    HEMOGLOBIN g/dL 12 6 13 0  --    I STAT HEMOGLOBIN g/dl  --   --  11 9   HEMATOCRIT % 37 8 39 3  --    HEMATOCRIT, ISTAT %  --   --  35   PLATELETS " Thousands/uL 291 271  --          Results from last 7 days   Lab Units 04/27/23  0509 04/26/23  1830 04/26/23  1644   SODIUM mmol/L 137 142  --    POTASSIUM mmol/L 4 2 3 7  --    CHLORIDE mmol/L 106 113*  --    CO2 mmol/L 26 24  --    CO2, I-STAT mmol/L  --   --  25   ANION GAP mmol/L 5 5  --    BUN mg/dL 14 16  --    CREATININE mg/dL 0 84 0 82  --    EGFR ml/min/1 73sq m 74 76  --    CALCIUM mg/dL 8 5 8 3  --    CALCIUM, IONIZED, ISTAT mmol/L  --   --  1 15   MAGNESIUM mg/dL 2 1 2 2  --              Results from last 7 days   Lab Units 04/27/23  0509 04/26/23  1830   GLUCOSE RANDOM mg/dL 192* 128     Results from last 7 days   Lab Units 04/26/23  1644   I STAT BASE EXC mmol/L 0   I STAT O2 SAT % 99*   ISTAT PH ART  7 417   I STAT ART PCO2 mm HG 37 2   I STAT ART PO2 mm  0*   I STAT ART HCO3 mmol/L 23 9         Diet: Regular  Mobility: Ambulate TID  DVT Prophylaxis: SCDs, Lovenox    Medications/Pain Control:   Scheduled Medications:  acetaminophen, 975 mg, Oral, Q6H  aspirin, 81 mg, Oral, Daily  docusate sodium, 100 mg, Oral, BID  enoxaparin, 40 mg, Subcutaneous, Daily  gabapentin, 300 mg, Oral, TID  pantoprazole, 40 mg, Oral, Early Morning  polyethylene glycol, 17 g, Oral, Daily  senna, 1 tablet, Oral, Daily      Continuous IV Infusions:  lactated ringers, 60 mL/hr, Intravenous, Continuous      PRN Meds:  bisacodyl, 10 mg, Oral, Daily PRN  CeFAZolin, 2,000 mg, Intravenous; 4/26 x1  HYDROmorphone, 0 5 mg, Intravenous, Q3H PRN  melatonin, 3 mg, Oral, HS PRN; 4/26 x1  ondansetron, 4 mg, Intravenous, Q6H PRN  oxyCODONE, 5 mg, Oral, Q4H PRN  potassium chloride, 20 mEq, Oral; 4/26 x1  potassium chloride, 20 mEq, Intravenous; 4/26 x1        Network Utilization Review Department  ATTENTION: Please call with any questions or concerns to [phone] and carefully listen to the prompts so that you are directed to the right person   All voicemails are confidential   Deepthi Alex all requests for admission clinical reviews, approved or denied determinations and any other requests to dedicated fax number below belonging to the campus where the patient is receiving treatment   List of dedicated fax numbers for the Facilities:  [address] [place] [address] DENIALS (Administrative/Medical Necessity) [phone]   [address] (Maternity/NICU/Pediatrics) [phone]   [address] [place] [address]  [phone]   [address] [facility] [address] [facility] 28 [address]je[address] Ana Cotton [address] [facility]2 [place] [address] [phone]

## 2023-04-27 NOTE — RESPIRATORY THERAPY NOTE
RT Protocol Note  Katlin Mendiola 60 y o  female MRN: [id number]  Unit/Bed#: [facility] 427-01 Encounter: [id number]    Assessment    Active Problems:    Mass of lower lobe of left lung      Home Pulmonary Medications:    Home Devices/Therapy:  (none)    Past Medical History:   Diagnosis Date    Actinic keratosis     last assessed - [id number]    Anemia     BRCA1 negative     neg results    Breast lump     last assessed - [id number]    Chest tightness or pressure     Resolved - [id number]    DVT (deep vein thrombosis) in pregnancy     Ground glass opacity present on imaging of lung     Shoulder pain, left     last assessed - [id number]     Social History     Socioeconomic History    Marital status: /Civil Union     Spouse name: None    Number of children: None    Years of education: None    Highest education level: None   Occupational History    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   Tobacco Use    Smoking status: Never    Smokeless tobacco: Never   Vaping Use    Vaping Use: Never used   Substance and Sexual Activity    Alcohol use:  Yes     Alcohol/week: 1 0 standard drink     Types: 1 Glasses of wine per week    Drug use: Never    Sexual activity: Yes     Partners: Male     Birth control/protection: Post-menopausal   Other Topics Concern    None   Social History Narrative    Activities:  Treadmill    Always uses seat belt    Caffeine use - 2 cups coffee per day    Exercise: Running    Exercise: Walking    Exercises moderately less than 3 times a week    Has smoke detectors    Weight training     Social Determinants of Health     Financial Resource Strain: Not on file   Food Insecurity: Not on file   Transportation Needs: Not on file   Physical Activity: Not on file   Stress: Not on file   Social Connections: Not on file   Intimate Partner Violence: Not on file   Housing Stability: Not on file       Subjective         Objective    Physical Exam:   Assessment Type: Post-treatment  General Appearance: Awake, "Alert  Respiratory Pattern: Normal  Chest Assessment: Chest expansion symmetrical  Bilateral Breath Sounds: Diminished  Cough: Non-productive  O2 Device: ra    Vitals:  Blood pressure 136/69, pulse 78, temperature (!) 97 1 °F (36 2 °C), temperature source Oral, resp  rate 18, height 5' 7 25\" (1 708 m), weight 82 kg (180 lb 12 4 oz), SpO2 100 %, not currently breastfeeding  Imaging and other studies: I have personally reviewed pertinent reports        O2 Device: ra     Plan       Airway Clearance Plan: Incentive Spirometer     Resp Comments: pt awake and out in the chair, pt using IS independently, VU=7048 cc   "

## 2023-04-27 NOTE — QUICK NOTE
Surgery Post-Op Check  Patricia Albert 60 y o  female MRN: [id number]  Unit/Bed#: [facility] 427-01 Encounter: [id number]     S:   Patient seen and examined bedside  No acute complaints  Denies nausea or emesis  Tolerating diet  No SOB or CP  Sating 100% ORA  Pulling 1250 on IS  L CT to waterseal without AL  Minimal serosang output in canister  PACU cxr and labs reviewed  IV K ordered for repletion  Patient with some arm burning during administration, and otherwise tolerating PO, so replaced with PO repletion  O:   Vitals:    04/26/23 1945   BP:    Pulse: 68   Resp: 13   Temp:    SpO2: 100%     I/O       04/25 0701 04/26 0700 04/26 0701 04/27 0700    I V  (mL/kg)  2050 (25 8)    IV Piggyback  50    Total Intake(mL/kg)  2100 (26 4)    Blood  50    Total Output  50    Net  +2050              PE:  Gen:  No acute distress  CV:  Warm, well-perfused  Lung:  Normal work of breathing, no respiratory distress ORA, CT to waterseal no AL, chest wall site c/d/i  Abd:  Soft, nondistended   Ext:  Moving all extremities  Neuro:  Alert and oriented, M/S grossly intact     Lab Results   Component Value Date    WBC 8 00 04/26/2023    HGB 13 0 04/26/2023    HCT 39 3 04/26/2023    MCV 92 04/26/2023     04/26/2023     Lab Results   Component Value Date    CALCIUM 8 3 04/26/2023    K 3 7 04/26/2023    CO2 24 04/26/2023     (H) 04/26/2023    BUN 16 04/26/2023    CREATININE 0 82 04/26/2023         A/P: 60 y o  female Day of Surgery s/p Procedure(s) (LRB):  THORACOSCOPY VIDEO ASSISTED SURGERY (VATS) (Left)  left thoracoscopic lower lobe wedge resection (Left)  completion lower lobectomy (Left)  BRONCHOSCOPY FLEXIBLE (N/A)    Plan:   Regular diet as tolerated   Maintain chest tube to water seal   Monitor output and character   DVT ppx   Out of bed, encourage ambulation   Encourage incentive spirometer use   Strict I's and O's   Pain and nausea control p r n     Please tiger text on call surgery resident for questions or concerns      Ren Niocle MD  PGY2, General Surgery

## 2023-04-27 NOTE — PLAN OF CARE
Problem: PHYSICAL THERAPY ADULT  Goal: Performs mobility at highest level of function for planned discharge setting  See evaluation for individualized goals  Description: Treatment/Interventions: LE strengthening/ROM, Elevations, Therapeutic exercise, Endurance training, Equipment eval/education, Bed mobility, Gait training, Spoke to nursing, OT, Family          See flowsheet documentation for full assessment, interventions and recommendations  4/27/2023 1229 by Luciano Pastrana PT  Note: Prognosis: Good  Problem List: Decreased strength, Decreased endurance, Impaired balance, Decreased mobility  Assessment: Pt is 60 y o  female admitted with Dx of Mass of lower lobe of left lung and underwent Left - THORACOSCOPY VIDEO ASSISTED SURGERY (VATS), Left - left thoracoscopic lower lobe wedge resection, Left - completion lower lobectomy and BRONCHOSCOPY FLEXIBLE on 4/26/2023  Pt 's comorbidities affecting POC include: anemia, anxiety, DVT and shoulder pain, left and personal factors of: ISSAC and steps in the house  Pt's clinical presentation is currently unstable/unpredictable which is evident in ongoing telem monitoring, CT in place and need for stand by assist w/ amb while using rw when usually mobilizing independently and w/o AD  Pt presents w/ min post op guarding, min overall weakness, decreased functional endurance, and inconsistent amb balance and gait patterns  Will cont to follow pt in PT for progressive mobilization to max level of (I), endurance, and safety  Otherwise, anticipate pt will return home w/ available family support upon D/C provided she cont improving w/ mobility skills, safety, and endurance and when medically cleared; OP pulm rehab is recommended when medically cleared  PT Discharge Recommendation: Home with outpatient rehabilitation    See flowsheet documentation for full assessment

## 2023-04-27 NOTE — PHYSICAL THERAPY NOTE
Physical Therapy Evaluation     Patient's Name: Mateusz Darby    Admitting Diagnosis  Mass of lower lobe of left lung [R91 8]    Problem List  Patient Active Problem List   Diagnosis    Postmenopause atrophic vaginitis    BMI 27 0-27 9,adult    Hypercholesterolemia    Anxiety    DVT (deep vein thrombosis) in pregnancy    Vitamin D deficiency    Family history of pancreatic cancer    Mass of lower lobe of left lung       Past Medical History  Past Medical History:   Diagnosis Date    Actinic keratosis     last assessed -     Anemia     BRCA1 negative     neg results    Breast lump     last assessed - 2017    Chest tightness or pressure     Resolved - [id number]    DVT (deep vein thrombosis) in pregnancy     Ground glass opacity present on imaging of lung     Shoulder pain, left     last assessed - [id number]       Past Surgical History  Past Surgical History:   Procedure Laterality Date    BREAST BIOPSY Right     neg results     SECTION      x 2    COLONOSCOPY      Complete Colonoscopy    TONSILLECTOMY          23 0959   PT Last Visit   PT Visit Date 23   Note Type   Note type Evaluation   Pain Assessment   Pain Assessment Tool FLACC   Pain Location/Orientation Orientation: Left; Location: Incision; Location: Chest   Pain Onset/Description Onset: Ongoing;Frequency: Intermittent; Descriptor: Aching;Descriptor: Discomfort   Effect of Pain on Daily Activities guarding   Patient's Stated Pain Goal No pain   Hospital Pain Intervention(s) Repositioned; Ambulation/increased activity; Emotional support   Pain Rating: FLACC (Rest) - Face 0   Pain Rating: FLACC (Rest) - Legs 0   Pain Rating: FLACC (Rest) - Activity 0   Pain Rating: FLACC (Rest) - Cry 0   Pain Rating: FLACC (Rest) - Consolability 0   Score: FLACC (Rest) 0   Pain Rating: FLACC (Activity) - Face 0   Pain Rating: FLACC (Activity) - Legs 0   Pain Rating: FLACC (Activity) - Activity 0   Pain Rating: FLACC (Activity) - Cry 0 Pain Rating: FLACC (Activity) - Consolability 0   Score: FLACC (Activity) 0   Restrictions/Precautions   Braces or Orthoses   (denies)   Other Precautions Multiple lines;Telemetry  ((L) CT)   Home Living   Type of Home House   Home Layout Two level  (1 ISSAC)   Prior Function   Level of [place] Independent with functional mobility  (amb w/o AD)   Lives With Spouse   General   Additional Pertinent History cleared for assessment by elisa   Family/Caregiver Present Yes   Cognition   Overall Cognitive Status WFL   Arousal/Participation Cooperative   Attention Within functional limits   Orientation Level Oriented to person;Oriented to place;Oriented to situation   Memory Within functional limits   Following Commands Follows one step commands without difficulty   Subjective   Subjective Alert; in the chair; agreeable to mobilize   RUE Assessment   RUE Assessment WFL  (AROM)   LUE Assessment   LUE Assessment WFL  (AROM)   RLE Assessment   RLE Assessment WFL  (AROM)   Strength RLE   RLE Overall Strength   (good -)   LLE Assessment   LLE Assessment WFL  (AROM)   Strength LLE   LLE Overall Strength   (good - (grossly))   Transfers   Sit to Stand 6  Modified independent   Stand to Sit 6  Modified independent   Ambulation/Elevation   Gait pattern Short stride; Inconsistent reji   Gait Assistance 5  Supervision   Assistive Device Rolling walker   Distance 280 ft   Stair Management Assistance Not tested   Balance   Static Sitting Good   Dynamic Sitting Fair +   Static Standing Fair   Dynamic Standing Fair   Ambulatory Fair -   Activity Tolerance   Activity Tolerance Patient tolerated treatment well   Medical Staff Made Aware Co-eval performed w/ OTR due to complexity of medical status   Nurse Made Aware spoke to STALIN Bruce   Assessment   Prognosis Good   Problem List Decreased strength;Decreased endurance; Impaired balance;Decreased mobility   Assessment Pt is 60 y o  female admitted with Dx of Mass of lower lobe of left lung and underwent Left - THORACOSCOPY VIDEO ASSISTED SURGERY (VATS), Left - left thoracoscopic lower lobe wedge resection, Left - completion lower lobectomy and BRONCHOSCOPY FLEXIBLE on 4/26/2023  Pt 's comorbidities affecting POC include: anemia, anxiety, DVT and shoulder pain, left and personal factors of: ISSAC and steps in the house  Pt's clinical presentation is currently unstable/unpredictable which is evident in ongoing telem monitoring, CT in place and need for stand by assist w/ amb while using rw when usually mobilizing independently and w/o AD  Pt presents w/ min post op guarding, min overall weakness, decreased functional endurance, and inconsistent amb balance and gait patterns  Will cont to follow pt in PT for progressive mobilization to max level of (I), endurance, and safety  Otherwise, anticipate pt will return home w/ available family support upon D/C provided she cont improving w/ mobility skills, safety, and endurance and when medically cleared; OP pulm rehab is recommended when medically cleared  Goals   Patient Goals to return home   STG Expiration Date 05/02/23   Short Term Goal #1 3-5 days  Pt will amb 400 ft w/o assistive device, (I) in order to facilitate safe return to premorbid environment and community amb status  Pt will negotiate 12 steps w/ hand rail, mod (I) in order to navigate between levels of home environment safely  Pt will negotiate 1 step w/o hand rail, (S)x1 in order to assure safe navigation in and out of the premorbid living environment  Pt will achieve (I) level w/ bed mob in order to facilitate safety with OOB and back to bed transitions in own living environment  Pt will participate in LE therex and balance activities to max progression w/ mobility skills  PT Treatment Day 0   Plan   Treatment/Interventions LE strengthening/ROM; Elevations; Therapeutic exercise; Endurance training;Equipment eval/education; Bed mobility;Gait training;Spoke to nursing;OT;Family   PT Frequency 3-5x/wk   Recommendation   PT Discharge Recommendation Home with outpatient rehabilitation   AM-PAC Basic Mobility Inpatient   Turning in Flat Bed Without Bedrails 4   Lying on Back to Sitting on Edge of Flat Bed Without Bedrails 4   Moving Bed to Chair 4   Standing Up From Chair Using Arms 4   Walk in Room 3   Climb 3-5 Stairs With Railing 3   Basic Mobility Inpatient Raw Score 22   Basic Mobility Standardized Score 47 4   Highest Level Of Mobility   JH-HLM Goal 7: Walk 25 feet or more   JH-HLM Achieved 8: Walk 250 feet ot more   Modified Morrison Scale   Modified Morrison Scale 3   End of Consult   Patient Position at End of Consult Bedside chair; All needs within reach           [facility], PT

## 2023-04-27 NOTE — DISCHARGE INSTR - AVS FIRST PAGE
Gently wash your incisions daily with soap and water, do not soak in a tub  Do not apply any lotions, creams, or ointments to incisions  No lifting over 10 lbs or strenuous exercise  No driving until seen at your post operative visit  The blue stitch will be removed at your post operative visit  Please obtain a pa/lat chest xray at a [facility] within 3 days of your follow up visit  Please call the office first if you have any questions or concerns during your post op period, prior to going to the emergency room or urgent care  You will be prescribed 3 medications to take at home, that should be taken exactly as directed  These have been shown to greatly improve post-operative pain:     Gabapentin 300mg tablet  Take 1 tablet by mouth 3x a day for 3 weeks  2    Tylenol 325mg tablet  Take 2 tablets by mouth, every 6 hours, for 1 week  3    Ibuprofen 200mg tablet  Take 3 tablets by mouth, 3x a day with meals for 1 week  You will also be prescribed a medication that you may take on an as needed basis:  Oxycodone 5mg tablet  Take 1-2 tablets every 4 hours as needed for pain

## 2023-04-27 NOTE — RESPIRATORY THERAPY NOTE
RT Protocol Note  Lukas Brewer 60 y o  female MRN: [id number]  Unit/Bed#: [facility] 427-01 Encounter: [id number]    Assessment    Principal Problem: Mass of lower lobe of left lung      Home Pulmonary Medications:    Home Devices/Therapy: (P)  (none)    Past Medical History:   Diagnosis Date    Actinic keratosis     last assessed - [id number]    Anemia     BRCA1 negative     neg results    Breast lump     last assessed - [id number]    Chest tightness or pressure     Resolved - [id number]    DVT (deep vein thrombosis) in pregnancy     Ground glass opacity present on imaging of lung     Shoulder pain, left     last assessed - [id number]     Social History     Socioeconomic History    Marital status: /Civil Union     Spouse name: None    Number of children: None    Years of education: None    Highest education level: None   Occupational History    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   Tobacco Use    Smoking status: Never    Smokeless tobacco: Never   Vaping Use    Vaping Use: Never used   Substance and Sexual Activity    Alcohol use:  Yes     Alcohol/week: 1 0 standard drink     Types: 1 Glasses of wine per week    Drug use: Never    Sexual activity: Yes     Partners: Male     Birth control/protection: Post-menopausal   Other Topics Concern    None   Social History Narrative    Activities:  Treadmill    Always uses seat belt    Caffeine use - 2 cups coffee per day    Exercise: Running    Exercise: Walking    Exercises moderately less than 3 times a week    Has smoke detectors    Weight training     Social Determinants of Health     Financial Resource Strain: Not on file   Food Insecurity: Not on file   Transportation Needs: Not on file   Physical Activity: Not on file   Stress: Not on file   Social Connections: Not on file   Intimate Partner Violence: Not on file   Housing Stability: Not on file       Subjective         Objective    Physical Exam:   Assessment Type: (P) Pre-treatment  General Appearance: (P) Alert, "Awake  Respiratory Pattern: (P) Normal  Chest Assessment: (P) Chest expansion symmetrical  Bilateral Breath Sounds: (P) Clear  Cough: (P) Non-productive  O2 Device: ra    Vitals:  Blood pressure 134/70, pulse (P) 60, temperature 98 °F (36 7 °C), temperature source Oral, resp  rate (P) 18, height 5' 7 25\" (1 708 m), weight 82 kg (180 lb 12 4 oz), SpO2 99 %, not currently breastfeeding  Imaging and other studies: I have personally reviewed pertinent reports        O2 Device: ra     Plan    Respiratory Plan: (P) Mild Distress pathway  Airway Clearance Plan: Incentive Spirometer     Resp Comments: (P) pt assessed for resp protocol, DX: mass of lower lobe of left lung, pt ordered udn tx for pt c/o chest tightness, BS CTA, pt on ra satting 97%, will order Q4 prn, Albuterol 2 5 mg for sob/wheezing   "

## 2023-04-27 NOTE — OCCUPATIONAL THERAPY NOTE
Occupational Therapy Evaluation     Patient Name: Shila Farfan  DSCKD'S Date: 2023  Problem List  Active Problems:    Mass of lower lobe of left lung    Past Medical History  Past Medical History:   Diagnosis Date    Actinic keratosis     last assessed -     Anemia     BRCA1 negative     neg results    Breast lump     last assessed - 2017    Chest tightness or pressure     Resolved - [id number]    DVT (deep vein thrombosis) in pregnancy     Ground glass opacity present on imaging of lung     Shoulder pain, left     last assessed -      Past Surgical History  Past Surgical History:   Procedure Laterality Date    BREAST BIOPSY Right     neg results     SECTION      x 2    COLONOSCOPY      Complete Colonoscopy    TONSILLECTOMY             23 1000   OT Last Visit   OT Visit Date 23   Note Type   Note type Evaluation   Pain Assessment   Pain Assessment Tool 0-10   Pain Score No Pain   Restrictions/Precautions   Weight Bearing Precautions Per Order No   Other Precautions Multiple lines;Telemetry; Fall Risk   Home Living   Type of [address] [place] [address] Two level;Bed/bath upstairs;1/2 bath on main level; Able to live on main level with bedroom/bathroom;Stairs to enter with rails  (1STE, FF of stairs inside to bed/bath, pt can remain on 1st floor if necessary)   Bathroom Shower/Tub Walk-in shower   Bathroom Toilet Standard   Bathroom Equipment Other (Comment)  (denies)   [address] Other (Comment)  (denies)   Additional Comments Pt reports living in a [facility] with 1STE and FF of steps inside for bed/bath  Pt is able to remain on 1st floor if needed  Prior Function   Level of [place] Independent with ADLs; Independent with functional mobility; Independent with IADLS   Lives With Spouse; Other (Comment)  (extended family live on same property as well)   Receives Help From Family   IADLs Independent with driving; Independent with "meal prep; Independent with medication management   Falls in the last 6 months 0   Vocational Retired   Lifestyle   Autonomy Pt reports being I in ADLs, IADLs, and does not use DME at baseline  (+)   Reciprocal Relationships Pt lives with her  who is retired and is able to A  Pt reports that extended family live on same property  Service to Others Pt is retired   Intrinsic Gratification Pt enjoys gardening  General   Family/Caregiver Present Yes   Subjective   Subjective \"I feel good\"   ADL   Where Assessed Chair   Eating Assistance 7  Independent   Grooming Assistance 7  Independent   UB Bathing Assistance 5  Supervision/Setup   LB Bathing Assistance 5  Supervision/Setup   UB Dressing Assistance 5  Supervision/Setup   LB Dressing Assistance 5  Supervision/Setup   Toileting Assistance  5  Supervision/Setup   Functional Assistance 5  Supervision/Setup   Bed Mobility   Additional Comments pt was sitting OOB in the chair upon arrival  Pt was left sitting in the chair at the end of the session with all necessary items within reach  Transfers   Sit to Stand 6  Modified independent   Stand to Sit 6  Modified independent   Additional Comments transfers with RW   Functional Mobility   Functional Mobility 5  Supervision   Additional Comments Pt was able to demonstrate household mobility with S and RW     Additional items Rolling walker   Balance   Static Sitting Good   Dynamic Sitting Fair +   Static Standing Fair +   Dynamic Standing Fair   Ambulatory Fair -   Activity Tolerance   Activity Tolerance Patient tolerated treatment well   Medical Staff Made Aware Seen with PT due to medical complexity   Nurse Made Aware RN made aware   RUE Assessment   RUE Assessment WFL   LUE Assessment   LUE Assessment WFL   Hand Function   Gross Motor Coordination Functional   Fine Motor Coordination Functional   Psychosocial   Psychosocial (WDL) WDL   Cognition   Overall Cognitive Status WFL   Arousal/Participation " Alert; Responsive;Arousable; Cooperative   Attention Within functional limits   Orientation Level Oriented X4   Memory Within functional limits   Following Commands Follows one step commands with increased time or repetition   Comments Pt was pleasant and cooperative t/o session  Assessment   Prognosis Good   Assessment Pt is 60 y o  female admitted to [facility] on 4/26/2023 w/ mass of lower lobe of L lung  Pt received thoracoscopy video assisted surgery (VATS)  Pt  has a past medical history of Actinic keratosis, Anemia, BRCA1 negative, Breast lump, Chest tightness or pressure, DVT (deep vein thrombosis) in pregnancy, Ground glass opacity present on imaging of lung, and Shoulder pain, left    Pt with active OT orders and activity orders  Pt resides in a two story Home, with 1 ISSAC  Pt lives with spouse  Pt was I w/ ADLs, IADLs, (+) drove  Pt is currently functioning at Ind for eating and grooming, mod I for transfers, and S for functional mobility and UB and LB ADLS  Pt does not require further acute OT services while in the hospital  Pt will be d/c from OT caseload  Based off of an OT evaulation, assessment, and performance functioning, pt is identified as a moderate complexity  The patient's raw score on the AM-PAC Daily Activity Inpatient Short Form is 24  A raw score of greater than or equal to 19 suggests the patient may benefit from discharge to home  Please refer to the recommendation of the Occupational Therapist for safe discharge planning  OT recommendation for d/c includes Home with increased social assist  Pt would benefit from continued practice with ADLs and functional mobility with staff     Goals   Patient Goals to go home   Recommendation   OT Discharge Recommendation No rehabilitation needs  (home with increased social support)   AM-PAC Daily Activity Inpatient   Lower Body Dressing 4   Bathing 4   Toileting 4   Upper Body Dressing 4   Grooming 4   Eating 4   Daily Activity Raw Score 24   Daily Activity Standardized Score (Calc for Raw Score >=11) 57 54   AM-PAC Applied Cognition Inpatient   Following a Speech/Presentation 4   Understanding Ordinary Conversation 4   Taking Medications 4   Remembering Where Things Are Placed or Put Away 4   Remembering List of 4-5 Errands 4   Taking Care of Complicated Tasks 4   Applied Cognition Raw Score 24   Applied Cognition Standardized Score 62 21   End of Consult   Education Provided Yes;Family or social support of family present for education by provider   Patient Position at End of Consult Bedside chair; All needs within reach   Nurse Communication Nurse aware of consult     Caryn Meyer OTR/L

## 2023-04-28 ENCOUNTER — TRANSITIONAL CARE MANAGEMENT (OUTPATIENT)
Dept: FAMILY MEDICINE CLINIC | Facility: CLINIC | Age: 63
End: 2023-04-28

## 2023-04-28 ENCOUNTER — APPOINTMENT (OUTPATIENT)
Dept: RADIOLOGY | Facility: HOSPITAL | Age: 63
End: 2023-04-28

## 2023-04-28 VITALS
WEIGHT: 178.79 LBS | TEMPERATURE: 97.5 F | HEART RATE: 64 BPM | BODY MASS INDEX: 28.06 KG/M2 | RESPIRATION RATE: 20 BRPM | SYSTOLIC BLOOD PRESSURE: 120 MMHG | OXYGEN SATURATION: 97 % | HEIGHT: 67 IN | DIASTOLIC BLOOD PRESSURE: 71 MMHG

## 2023-04-28 RX ORDER — GUAIFENESIN 600 MG/1
600 TABLET, EXTENDED RELEASE ORAL EVERY 12 HOURS SCHEDULED
Status: DISCONTINUED | OUTPATIENT
Start: 2023-04-28 | End: 2023-04-28 | Stop reason: HOSPADM

## 2023-04-28 RX ORDER — GUAIFENESIN 600 MG/1
600 TABLET, EXTENDED RELEASE ORAL EVERY 12 HOURS SCHEDULED
Qty: 10 TABLET | Refills: 0 | Status: SHIPPED | OUTPATIENT
Start: 2023-04-28 | End: 2023-05-03

## 2023-04-28 RX ORDER — GABAPENTIN 300 MG/1
300 CAPSULE ORAL 3 TIMES DAILY
Qty: 63 CAPSULE | Refills: 0 | Status: SHIPPED | OUTPATIENT
Start: 2023-04-28

## 2023-04-28 RX ORDER — IBUPROFEN 600 MG/1
600 TABLET ORAL EVERY 8 HOURS SCHEDULED
Qty: 21 TABLET | Refills: 0 | Status: SHIPPED | OUTPATIENT
Start: 2023-04-28 | End: 2023-05-05

## 2023-04-28 RX ORDER — ACETAMINOPHEN 325 MG/1
650 TABLET ORAL EVERY 6 HOURS
Qty: 56 TABLET | Refills: 0 | Status: SHIPPED | OUTPATIENT
Start: 2023-04-28 | End: 2023-05-05

## 2023-04-28 RX ORDER — OXYCODONE HYDROCHLORIDE 5 MG/1
5 TABLET ORAL EVERY 4 HOURS PRN
Qty: 30 TABLET | Refills: 0 | Status: SHIPPED | OUTPATIENT
Start: 2023-04-28 | End: 2023-05-08

## 2023-04-28 RX ADMIN — SENNOSIDES 8.6 MG: 8.6 TABLET, FILM COATED ORAL at 08:21

## 2023-04-28 RX ADMIN — ACETAMINOPHEN 975 MG: 325 TABLET ORAL at 04:48

## 2023-04-28 RX ADMIN — PANTOPRAZOLE SODIUM 40 MG: 40 TABLET, DELAYED RELEASE ORAL at 05:00

## 2023-04-28 RX ADMIN — GABAPENTIN 300 MG: 300 CAPSULE ORAL at 08:21

## 2023-04-28 RX ADMIN — GUAIFENESIN 600 MG: 600 TABLET, EXTENDED RELEASE ORAL at 08:27

## 2023-04-28 RX ADMIN — ENOXAPARIN SODIUM 40 MG: 40 INJECTION SUBCUTANEOUS at 08:21

## 2023-04-28 RX ADMIN — ACETAMINOPHEN 975 MG: 325 TABLET ORAL at 12:10

## 2023-04-28 RX ADMIN — POLYETHYLENE GLYCOL 3350 17 G: 17 POWDER, FOR SOLUTION ORAL at 08:21

## 2023-04-28 RX ADMIN — DOCUSATE SODIUM 100 MG: 100 CAPSULE, LIQUID FILLED ORAL at 08:21

## 2023-04-28 RX ADMIN — ASPIRIN 81 MG CHEWABLE TABLET 81 MG: 81 TABLET CHEWABLE at 08:21

## 2023-04-28 NOTE — PROGRESS NOTES
"Progress Note - Patricia Albert 60 y o  female MRN: [id number]    Unit/Bed#: [facility] 427-01 Encounter: [id number]      Assessment:  Patricia Albert is a 60 y o  female who presents with lung mass now s/p VATS / LL lob[facility] with [facility]    VSS afebrile ORA  UOP >2L  CT -8, -AL, 200cc dark serosang  Pulling 750 on IS this morning, 1000 for RT overnight      Plan:  Diet as tolerated  Consider pull chest tube and obtain post-pull chest xray  DVT ppx  Pain and nausea control PRN  Strict I/Os  OOB ambulation  Q1hr IS use  PT: home rehab      Subjective:   Patient seen and examined bedside  OOB and ambulating yesterday  Some chest wall discomfort  No SOB  Denies nausea or emesis  Objective:     Vitals: Blood pressure 133/71, pulse 68, temperature 97 5 °F (36 4 °C), temperature source Oral, resp  rate 18, height 5' 7 25\" (1 708 m), weight 81 1 kg (178 lb 12 7 oz), SpO2 98 %, not currently breastfeeding  ,Body mass index is 27 8 kg/m²  Intake/Output Summary (Last 24 hours) at 4/28/2023 0612  Last data filed at 4/27/2023 2200  Gross per 24 hour   Intake 829 ml   Output 2325 ml   Net -1496 ml       Physical Exam:   General - no acute distress, responsive and cooperative  CV - warm, regular rate  Pulm - normal work of breathing, no respiratory distress ORA, CT on left to waterseal without airleak and dark serosang output  Abd - soft, nondistended  Neuro - m/s grossly intact, cn grossly intact  Ext - moving all extremities       Invasive Devices     Peripheral Intravenous Line  Duration           Peripheral IV 04/26/23 Dorsal (posterior); Right Hand 1 day    Peripheral IV 04/26/23 Right Hand 1 day          Drain  Duration           Chest Tube 1 Left Pleural 24 Fr  1 day                Lab, Imaging and other studies: I have personally reviewed pertinent reports      VTE Pharmacologic Prophylaxis: Enoxaparin (Lovenox)  VTE Mechanical Prophylaxis: sequential compression device   "

## 2023-04-28 NOTE — QUICK NOTE
04/28/23    Procedure: Chest tube removal    Left chest tube removed in routine fashion without incident  The patient tolerated the procedure well  A dry, sterile dressing was placed  Will check a pa/lat chest x-ray       Nidia George PA-C

## 2023-04-28 NOTE — DISCHARGE SUMMARY
Discharge Summary - Thoracic Surgery   Concepción Villa 60 y o  female MRN: [id number]  Unit/Bed#: [facility] 427-01 Encounter: [id number]    Admission Date:   Admission Orders (From admission, onward)     Ordered        04/26/23 1748  Inpatient Admission  Once                         Discharge Date: 4/28/23    Admitting Diagnosis: Mass of lower lobe of left lung [R91 8]    Discharge Diagnosis: as above    Medical Problems     Resolved Problems  Date Reviewed: 4/28/2023   None         Attending: Dr Patsy Brooks Physician(s): none    Procedures Performed: left VATS lower lobe wedge resection, completion lower lobectomy , bronchoscopy 4/26/23    Pathology: FSDx: Non-small cell carcinoma  Dr Barb lamb  Final pathology is pending     Hospital Course: Ms Valentin Rodriguez is a 60year old female who underwent the above procedure on 4/26/23  Completion lobectomy was performed once frozen pathology confirmed NSCLC  She tolerated the procedure well and was transferred to the floor with a chest tube in place  Her post-operative course was uncomplicated and her chest tube was removed on POD#2  Post-pull CXR was without pneumothorax  She was discharged home with followup in 2 weeks  Condition at Discharge: good     Discharge instructions/Information to patient and family:   See after visit summary for information provided to patient and family  Provisions for Follow-Up Care:  See after visit summary for information related to follow-up care and any pertinent home health orders  Disposition: Home          Planned Readmission: No    Discharge Statement   I spent 20 minutes discharging the patient  This time was spent on the day of discharge  I had direct contact with the patient on the day of discharge  Additional documentation is required if more than 30 minutes were spent on discharge       Discharge Medications:  See after visit summary for reconciled discharge medications provided to patient and family

## 2023-05-01 NOTE — UTILIZATION REVIEW
NOTIFICATION OF ADMISSION DISCHARGE   This is a Notification of Discharge from [address] [place] [address]  Please be advised that this patient has been discharge from our facility  Below you will find the admission and discharge date and time including the patients disposition  UTILIZATION REVIEW CONTACT:  Marilou Mckenna  Utilization   Network Utilization Review Department  Phone: [phone] x carefully listen to the prompts  All voicemails are confidential   Email: [email or web address]     ADMISSION INFORMATION  PRESENTATION DATE: 4/26/2023 10:13 AM  OBERVATION ADMISSION DATE  INPATIENT ADMISSION DATE: 4/26/23  5:48 PM   DISCHARGE DATE: 4/28/2023 12:39 PM   DISPOSITION:Home/Self Care    IMPORTANT INFORMATION:  Send all requests for admission clinical reviews, approved or denied determinations and any other requests to dedicated fax number below belonging to the campus where the patient is receiving treatment   List of dedicated fax numbers:  [address] DENIALS (Administrative/Medical Necessity) [phone]   [address] (Maternity/NICU/Pediatrics) [phone]   [facility] [phone]   [address] [place] [address] [phone]   [address] [place] [address] [phone]   2000 [facility] [address] [place] [address],[address] 1525 [facility] [phone]   [facility]  [phone]   2205 [facility] [address] [facility]  2401 [facility] 1000 [address] [facility] [phone]

## 2023-05-02 ENCOUNTER — VBI (OUTPATIENT)
Dept: ADMINISTRATIVE | Facility: OTHER | Age: 63
End: 2023-05-02

## 2023-05-02 NOTE — TELEPHONE ENCOUNTER
05/02/23 9:53 AM     VB [employer] SmartForm used to document caregap status      Maria Fernanda Carter

## 2023-05-08 ENCOUNTER — HOSPITAL ENCOUNTER (OUTPATIENT)
Dept: RADIOLOGY | Facility: HOSPITAL | Age: 63
Discharge: HOME | End: 2023-05-08

## 2023-05-08 DIAGNOSIS — R91.8 MASS OF LOWER LOBE OF LEFT LUNG: ICD-10-CM

## 2023-05-11 ENCOUNTER — OFFICE VISIT (OUTPATIENT)
Dept: CARDIAC SURGERY | Facility: CLINIC | Age: 63
End: 2023-05-11

## 2023-05-11 VITALS
RESPIRATION RATE: 17 BRPM | DIASTOLIC BLOOD PRESSURE: 74 MMHG | SYSTOLIC BLOOD PRESSURE: 119 MMHG | HEIGHT: 67 IN | TEMPERATURE: 98.6 F | HEART RATE: 57 BPM | OXYGEN SATURATION: 99 % | WEIGHT: 175.49 LBS | BODY MASS INDEX: 27.54 KG/M2

## 2023-05-11 DIAGNOSIS — C34.32 PRIMARY ADENOCARCINOMA OF LOWER LOBE OF LEFT LUNG (HCC): Primary | ICD-10-CM

## 2023-05-11 NOTE — PROGRESS NOTES
Thoracic Follow-Up  Assessment/Plan:    Primary adenocarcinoma of lower lobe of left lung [facility])  Ms Alex Whitaker is doing very well about two weeks out from a VATS left lower lobectomy for Stage IA3 adenocarcinoma  Her stitch was removed at this visit, her incisions are healing well, she has been increasing her activity doing walks and Peloton  Her CXR was personally reviewed by myself in PACS and shows expected post-surgical changes  She will return in 4 weeks for routine post-operative care  All questions were answered and she is in agreement with this plan  Diagnoses and all orders for this visit:    Primary adenocarcinoma of lower lobe of left lung [facility])          Thoracic History    Diagnosis: left lower lobe lung nodule  Procedure: 4/26/23 VATS left lower, lobe wedge resection completion lobectomy     Cancer Staging   Primary adenocarcinoma of lower lobe of left lung [facility])  Staging form: Lung, AJCC 8th Edition  - Clinical stage from 4/26/2023: Stage IA3 (cT1c, cN0, cM0) - Signed by Nani Lucio PA-C on 5/11/2023  Histopathologic type: Adenocarcinoma, NOS  Histologic grade (G): G2  Histologic grading system: 4 grade system  Lymph-vascular invasion (LVI): LVI not present (absent)/not identified           Subjective:    Patient ID: Fran Martinez is a 58 y o  female  ECOG 0    HPI   Ms Alex Whitaker is a 60year old female who presents post-operatively from a left VATS lower lobe wedge resection, completion lower lobectomy on 4/26/23  She had an uncomplicated hospital course and was discharged on POD2  Final pathology was consistent with Stage IA3 adenocarcinoma  CXR from 5/8/23 shows mild blunting of the left costophrenic angle, likely postsurgical changes  On discussion she is feeling well  She went on a 0 5 mile walk yesterday  She has also been doing gentle Peloton rides  She denies SOB/cough/fevers/chills       The following portions of the patient's history were reviewed and updated as appropriate: allergies, current medications, past family history, past medical history, past social history, past surgical history and problem list     Review of Systems   Constitutional: Negative for chills, diaphoresis and unexpected weight change  HENT: Negative  Eyes: Negative  Respiratory: Negative for cough, shortness of breath and wheezing  Cardiovascular: Negative for chest pain and leg swelling  Gastrointestinal: Negative for abdominal pain, diarrhea and nausea  Endocrine: Negative  Genitourinary: Negative  Musculoskeletal: Negative  Skin: Negative  Allergic/Immunologic: Negative  Neurological: Negative  Hematological: Negative for adenopathy  Does not bruise/bleed easily  Psychiatric/Behavioral: Negative  All other systems reviewed and are negative  Objective:   Physical Exam  Vitals reviewed  Constitutional:       General: She is not in acute distress  Appearance: Normal appearance  She is not ill-appearing  HENT:      Head: Normocephalic  Nose: Nose normal       Mouth/Throat:      Mouth: Mucous membranes are moist    Eyes:      Pupils: Pupils are equal, round, and reactive to light  Cardiovascular:      Rate and Rhythm: Normal rate and regular rhythm  Heart sounds: Normal heart sounds  Pulmonary:      Effort: Pulmonary effort is normal       Breath sounds: Normal breath sounds  No wheezing, rhonchi or rales  Chest:      Comments: Surgical incisions well healed  Prolene stitch removed  Abdominal:      Palpations: Abdomen is soft  Musculoskeletal:         General: No swelling  Normal range of motion  Lymphadenopathy:      Cervical: No cervical adenopathy  Skin:     General: Skin is warm  Neurological:      General: No focal deficit present  Mental Status: She is alert and oriented to person, place, and time     Psychiatric:         Mood and Affect: Mood normal      /74 (BP Location: Right arm, Patient Position: Sitting, "Cuff Size: Standard)   Pulse 57   Temp 98 6 °F (37 °C) (Temporal)   Resp 17   Ht 5' 7 25\" (1 708 m)   Wt 79 6 kg (175 lb 7 8 oz)   SpO2 99%   BMI 27 28 kg/m²     XR chest pa & lateral    Result Date: 5/10/2023  Impression Mild blunting of left costophrenic angle may represent postsurgical change or a less likely small effusion  No pneumothorax  Workstation performed: [id number]     XR chest pa & lateral    Result Date: 4/28/2023  Impression Left chest tube removal with no pneumothorax  Workstation performed: [id number]      CT chest wo contrast    Result Date: 3/1/2023  Narrative CT CHEST WITHOUT IV CONTRAST INDICATION:   R91 8: Other nonspecific abnormal finding of lung field  Per my review of the medical record, the abdomen CT from 2/9/2023 shows 1 8 cm left lower lobe nodule  Patient is scheduled to see thoracic surgery tomorrow  COMPARISON:  Abdomen CT 02/09/2023 and calcium scoring CT 06/04/2022  TECHNIQUE: Chest CT without intravenous contrast   Axial, sagittal, coronal 2D reformats and coronal MIPS from source data  Radiation dose length product (DLP):  238 54 mGy-cm   Radiation dose exposure minimized using iterative reconstruction and automated exposure control  FINDINGS: LUNGS:  2 0 cm superior segment lateral left lower lobe groundglass nodule with spiculation extending to the major fissure  What appears to be a solid component on the axial series instead has the appearance of adjacent atelectasis/scar on the sagittal and coronal series  Benign calcified granulomas  AIRWAYS: No significant filling defects  PLEURA:  Unremarkable  HEART/GREAT VESSELS:  Normal heart size  Mild coronary artery calcification indicating atherosclerotic heart disease  MEDIASTINUM AND KANDI:  Unremarkable  CHEST WALL AND LOWER NECK: Clips in the right breast  UPPER ABDOMEN:  Unremarkable  OSSEOUS STRUCTURES: Mild degenerative disease in the spine       Impression 2 0 cm lateral superior segment left lower lobe " groundglass nodule with spiculation extending to the major fissure  There is no definite solid component; what appears to be a solid component in the axial series is instead due to adjacent atelectasis/scar in the sagittal and coronal series  This could be an adenocarcinoma spectrum lesion and follow-up is recommended with a chest CT with no contrast in 6-12 months per 2017 Fleischner Society guidelines  This study was marked in Epic for follow-up  Workstation performed: [id number]     No CT Chest,Abdomen,Pelvis results available for this patient  NM PET CT skull base to mid thigh    Result Date: 3/16/2023  Narrative PET/CT SCAN INDICATION: R91 8: Other nonspecific abnormal finding of lung field Abnormal CT of the chest demonstrating left lung opacity  MODIFIER: PI COMPARISON: CT chest, 2/27/2023 CELL TYPE:  N/A TECHNIQUE:   8 0 mCi F-18-FDG administered IV  Multiplanar attenuation corrected and non attenuation corrected PET images are available for interpretation, and contiguous, low dose, axial CT sections were obtained from the skull base through the femurs  Intravenous contrast material was not utilized  This examination, like all CT scans performed in the [facility], was performed utilizing techniques to minimize radiation dose exposure, including the use of iterative reconstruction and automated exposure control  Fasting serum glucose: 95 mg/dl FINDINGS: VISUALIZED BRAIN:   No acute abnormalities are seen  HEAD/NECK:   There is a physiologic distribution of FDG  No FDG avid cervical adenopathy is seen  Focus of activity in the right neck image 49 felt to be related to strap muscle activity CT images: Unremarkable  CHEST:   - Irregular 2 cm lesion in the left lower lobe (image 97) with spiculation extending to the major fissure demonstrates no significant FDG activity    SUV max is 1 1, for comparison this is essentially the same as background lung activity -There is no evidence of FDG avid mediastinal or hilar adenopathy  CT images: No pleural or pericardial effusion  Mild coronary artery calcification present ABDOMEN:   No FDG avid soft tissue lesions are seen  CT images: No significant findings PELVIS: No FDG avid soft tissue lesions are seen  CT images: Unremarkable  OSSEOUS STRUCTURES: No FDG avid lesions are seen  CT images: No significant findings  Impression 1  The 2 cm left lower lobe lesion demonstrates no abnormal FDG avidity  Nevertheless based on its imaging characteristics this remains most concerning for adenocarcinoma spectrum lesion  Based on current Fleischner Society 2017 Guidelines on incidental pulmonary nodule, at minimum this showed be reassessed with noncontrast chest CT in 6 months time  2  No evidence of FDG avid mediastinal or hilar adenopathy  No evidence of FDG avid distant metastatic disease  The examination demonstrates a finding requiring imaging follow-up and was logged as such in [address] [place] [address]  Workstation performed: [id number]      No Barium Swallow results available for this patient

## 2023-05-11 NOTE — LETTER
May 11, 2023     Marlon Romero [address] [address]    Patient: Jet Cosby   YOB: 1960   Date of Visit: 5/11/2023       Dear Dr Kayode Dorantes: Thank you for referring Crescencio Hutchins to me for evaluation  Below are my notes for this consultation  If you have questions, please do not hesitate to call me  I look forward to following your patient along with you  Sincerely,        Salbador Osler, MD        CC: No Recipients  Yony Falcon [place]  5/11/2023  3:35 PM  Attested  Thoracic Follow-Up  Assessment/Plan:    Primary adenocarcinoma of lower lobe of left lung [facility])  Ms Gregory Pham is doing very well about two weeks out from a VATS left lower lobectomy for Stage IA3 adenocarcinoma  Her stitch was removed at this visit, her incisions are healing well, she has been increasing her activity doing walks and Peloton  Her CXR was personally reviewed by myself in PACS and shows expected post-surgical changes  She will return in 4 weeks for routine post-operative care  All questions were answered and she is in agreement with this plan  Diagnoses and all orders for this visit:    Primary adenocarcinoma of lower lobe of left lung [facility])         Thoracic History    Diagnosis: left lower lobe lung nodule  Procedure: 4/26/23 VATS left lower, lobe wedge resection completion lobectomy     Cancer Staging   Primary adenocarcinoma of lower lobe of left lung [facility])  Staging form: Lung, AJCC 8th Edition  - Clinical stage from 4/26/2023: Stage IA3 (cT1c, cN0, cM0) - Signed by Suma Allen PA-C on 5/11/2023  Histopathologic type: Adenocarcinoma, NOS  Histologic grade (G): G2  Histologic grading system: 4 grade system  Lymph-vascular invasion (LVI): LVI not present (absent)/not identified          Subjective:   Patient ID: Jet Cosby is a 58 y o  female   ECOG 0    HPI   Ms Gregory Pham is a 60year old female who presents post-operatively from a left VATS lower lobe wedge resection, completion lower lobectomy on 4/26/23  She had an uncomplicated hospital course and was discharged on POD2  Final pathology was consistent with Stage IA3 adenocarcinoma  CXR from 5/8/23 shows mild blunting of the left costophrenic angle, likely postsurgical changes  On discussion she is feeling well  She went on a 0 5 mile walk yesterday  She has also been doing gentle Peloton rides  She denies SOB/cough/fevers/chills  The following portions of the patient's history were reviewed and updated as appropriate: allergies, current medications, past family history, past medical history, past social history, past surgical history and problem list     Review of Systems   Constitutional: Negative for chills, diaphoresis and unexpected weight change  HENT: Negative  Eyes: Negative  Respiratory: Negative for cough, shortness of breath and wheezing  Cardiovascular: Negative for chest pain and leg swelling  Gastrointestinal: Negative for abdominal pain, diarrhea and nausea  Endocrine: Negative  Genitourinary: Negative  Musculoskeletal: Negative  Skin: Negative  Allergic/Immunologic: Negative  Neurological: Negative  Hematological: Negative for adenopathy  Does not bruise/bleed easily  Psychiatric/Behavioral: Negative  All other systems reviewed and are negative  Objective:  Physical Exam  Vitals reviewed  Constitutional:       General: She is not in acute distress  Appearance: Normal appearance  She is not ill-appearing  HENT:      Head: Normocephalic  Nose: Nose normal       Mouth/Throat:      Mouth: Mucous membranes are moist    Eyes:      Pupils: Pupils are equal, round, and reactive to light  Cardiovascular:      Rate and Rhythm: Normal rate and regular rhythm  Heart sounds: Normal heart sounds  Pulmonary:      Effort: Pulmonary effort is normal       Breath sounds: Normal breath sounds   No wheezing, rhonchi or "rales  Chest:      Comments: Surgical incisions well healed  Prolene stitch removed  Abdominal:      Palpations: Abdomen is soft  Musculoskeletal:         General: No swelling  Normal range of motion  Lymphadenopathy:      Cervical: No cervical adenopathy  Skin:     General: Skin is warm  Neurological:      General: No focal deficit present  Mental Status: She is alert and oriented to person, place, and time  Psychiatric:         Mood and Affect: Mood normal      /74 (BP Location: Right arm, Patient Position: Sitting, Cuff Size: Standard)   Pulse 57   Temp 98 6 °F (37 °C) (Temporal)   Resp 17   Ht 5' 7 25\" (1 708 m)   Wt 79 6 kg (175 lb 7 8 oz)   SpO2 99%   BMI 27 28 kg/m²    XR chest pa & lateral    Result Date: 5/10/2023  Impression Mild blunting of left costophrenic angle may represent postsurgical change or a less likely small effusion  No pneumothorax  Workstation performed: [id number]     XR chest pa & lateral    Result Date: 4/28/2023  Impression Left chest tube removal with no pneumothorax  Workstation performed: [id number]      CT chest wo contrast    Result Date: 3/1/2023  Narrative CT CHEST WITHOUT IV CONTRAST INDICATION:   R91 8: Other nonspecific abnormal finding of lung field  Per my review of the medical record, the abdomen CT from 2/9/2023 shows 1 8 cm left lower lobe nodule  Patient is scheduled to see thoracic surgery tomorrow  COMPARISON:  Abdomen CT 02/09/2023 and calcium scoring CT 06/04/2022  TECHNIQUE: Chest CT without intravenous contrast   Axial, sagittal, coronal 2D reformats and coronal MIPS from source data  Radiation dose length product (DLP):  238 54 mGy-cm   Radiation dose exposure minimized using iterative reconstruction and automated exposure control  FINDINGS: LUNGS:  2 0 cm superior segment lateral left lower lobe groundglass nodule with spiculation extending to the major fissure    What appears to be a solid component on the axial series instead has " the appearance of adjacent atelectasis/scar on the sagittal and coronal series  Benign calcified granulomas  AIRWAYS: No significant filling defects  PLEURA:  Unremarkable  HEART/GREAT VESSELS:  Normal heart size  Mild coronary artery calcification indicating atherosclerotic heart disease  MEDIASTINUM AND KANDI:  Unremarkable  CHEST WALL AND LOWER NECK: Clips in the right breast  UPPER ABDOMEN:  Unremarkable  OSSEOUS STRUCTURES: Mild degenerative disease in the spine  Impression 2 0 cm lateral superior segment left lower lobe groundglass nodule with spiculation extending to the major fissure  There is no definite solid component; what appears to be a solid component in the axial series is instead due to adjacent atelectasis/scar in the sagittal and coronal series  This could be an adenocarcinoma spectrum lesion and follow-up is recommended with a chest CT with no contrast in 6-12 months per 2017 Fleischner Society guidelines  This study was marked in Epic for follow-up  Workstation performed: [id number]     No CT Chest,Abdomen,Pelvis results available for this patient  NM PET CT skull base to mid thigh    Result Date: 3/16/2023  Narrative PET/CT SCAN INDICATION: R91 8: Other nonspecific abnormal finding of lung field Abnormal CT of the chest demonstrating left lung opacity  MODIFIER: PI COMPARISON: CT chest, 2/27/2023 CELL TYPE:  N/A TECHNIQUE:   8 0 mCi F-18-FDG administered IV  Multiplanar attenuation corrected and non attenuation corrected PET images are available for interpretation, and contiguous, low dose, axial CT sections were obtained from the skull base through the femurs  Intravenous contrast material was not utilized  This examination, like all CT scans performed in the [facility], was performed utilizing techniques to minimize radiation dose exposure, including the use of iterative reconstruction and automated exposure control    Fasting serum glucose: 95 mg/dl FINDINGS: VISUALIZED BRAIN:   No acute abnormalities are seen  HEAD/NECK:   There is a physiologic distribution of FDG  No FDG avid cervical adenopathy is seen  Focus of activity in the right neck image 49 felt to be related to strap muscle activity CT images: Unremarkable  CHEST:   - Irregular 2 cm lesion in the left lower lobe (image 97) with spiculation extending to the major fissure demonstrates no significant FDG activity  SUV max is 1 1, for comparison this is essentially the same as background lung activity -There is no evidence of FDG avid mediastinal or hilar adenopathy  CT images: No pleural or pericardial effusion  Mild coronary artery calcification present ABDOMEN:   No FDG avid soft tissue lesions are seen  CT images: No significant findings PELVIS: No FDG avid soft tissue lesions are seen  CT images: Unremarkable  OSSEOUS STRUCTURES: No FDG avid lesions are seen  CT images: No significant findings  Impression 1  The 2 cm left lower lobe lesion demonstrates no abnormal FDG avidity  Nevertheless based on its imaging characteristics this remains most concerning for adenocarcinoma spectrum lesion  Based on current Fleischner Society 2017 Guidelines on incidental pulmonary nodule, at minimum this showed be reassessed with noncontrast chest CT in 6 months time  2  No evidence of FDG avid mediastinal or hilar adenopathy  No evidence of FDG avid distant metastatic disease  The examination demonstrates a finding requiring imaging follow-up and was logged as such in [address] [place] [address] Rd  Workstation performed: [id number]      No Barium Swallow results available for this patient  Attestation signed by Lesley Oliveira MD at 5/11/2023  4:20 PM:  I supervised the Advanced Practitioner  I discussed the case with the Advanced Practitioner, reviewed the note and agree  Mauricio Noriega presents back to my office today for her first postoperative visit status post VATS left lower lobe wedge resection with completion lobectomy  Today in the office, she is doing incredibly well  I am very happy with her progress after surgery  I personally reviewed her chest x-ray in PACS  This demonstrates a very small remaining effusion  This is a very common finding after the surgery  This will resolve with time  We discussed her pathology in detail in the office today  This was a stage Ia lung cancer  I explained to the patient that this confers an excellent prognosis with a greater than 90% cancer free survival   Her cancer also had favorable characteristics that support her excellent prognosis  I explained to her that this is considered a surgical cure she will not need any chemotherapy or radiation  At this time, I will have the patient return my office in another 4 weeks to ensure continued progress  She knows to call if anything changes in the interim      Guero Maki MD  Thoracic Surgery  (Available on [employer])  Office: [phone]      May Batista MD 05/11/23

## 2023-05-11 NOTE — ASSESSMENT & PLAN NOTE
Ms Gregory Pham is doing very well about two weeks out from a VATS left lower lobectomy for Stage IA3 adenocarcinoma  Her stitch was removed at this visit, her incisions are healing well, she has been increasing her activity doing walks and Peloton  Her CXR was personally reviewed by myself in PACS and shows expected post-surgical changes  She will return in 4 weeks for routine post-operative care  All questions were answered and she is in agreement with this plan

## 2023-06-09 ENCOUNTER — TELEPHONE (OUTPATIENT)
Dept: CARDIAC SURGERY | Facility: CLINIC | Age: 63
End: 2023-06-09

## 2023-06-09 ENCOUNTER — TELEPHONE (OUTPATIENT)
Dept: HEMATOLOGY ONCOLOGY | Facility: CLINIC | Age: 63
End: 2023-06-09

## 2023-06-09 NOTE — TELEPHONE ENCOUNTER
Appointment Confirmation   Who are you speaking with? Patient   If it is not the patient, are they listed on an active communication consent form? N/A   Which provider is the appointment scheduled with? [place] Royal    When is the appointment scheduled? Please list date and time           06/29/2023 @3PM    At which location is the appointment scheduled to take place? [place]   Did caller verbalize understanding of appointment details?  Yes

## 2023-06-09 NOTE — TELEPHONE ENCOUNTER
LVM for pt requesting a call back in regards to follow up appointment on 06/29/23 with Dr Slim Pressley  I informed pt that Dr Slim Pressley will be [place] that day and she can either stay on he schedule for that day and see [facility] or reschedule with Dr Slim Pressley

## 2023-06-29 ENCOUNTER — OFFICE VISIT (OUTPATIENT)
Dept: CARDIAC SURGERY | Facility: CLINIC | Age: 63
End: 2023-06-29

## 2023-06-29 VITALS
SYSTOLIC BLOOD PRESSURE: 138 MMHG | OXYGEN SATURATION: 99 % | DIASTOLIC BLOOD PRESSURE: 76 MMHG | TEMPERATURE: 97.4 F | HEART RATE: 52 BPM | BODY MASS INDEX: 27.99 KG/M2 | HEIGHT: 67 IN | WEIGHT: 178.35 LBS

## 2023-06-29 DIAGNOSIS — C34.32 PRIMARY ADENOCARCINOMA OF LOWER LOBE OF LEFT LUNG (HCC): Primary | ICD-10-CM

## 2023-06-29 PROCEDURE — 99024 POSTOP FOLLOW-UP VISIT: CPT | Performed by: PHYSICIAN ASSISTANT

## 2023-06-29 NOTE — ASSESSMENT & PLAN NOTE
Celestine Wheat is recovering well from a thoracic surgery standpoint  Her incisions are well healed and she is back to her daily activities, without any limitation  At this point, we will begin routine surveillance and have her return in October with her first post op CT scan of the chest without contrast  The patient is in agreement with the plan

## 2023-06-29 NOTE — PROGRESS NOTES
Thoracic Follow-Up  Assessment/Plan:    Primary adenocarcinoma of lower lobe of left lung ([facility] [address] )  Robert Shafer is recovering well from a thoracic surgery standpoint  Her incisions are well healed and she is back to her daily activities, without any limitation  At this point, we will begin routine surveillance and have her return in October with her first post op CT scan of the chest without contrast  The patient is in agreement with the plan  Diagnoses and all orders for this visit:    Primary adenocarcinoma of lower lobe of left lung ([facility] [address] )  -     CT chest wo contrast; Future          Thoracic History     Diagnosis: left lower lobe lung nodule  Procedure: 4/26/23 VATS left lower, lobe wedge resection completion lobectomy   Cancer Staging   Primary adenocarcinoma of lower lobe of left lung [facility])  Staging form: Lung, AJCC 8th Edition  - Clinical stage from 4/26/2023: Stage IA3 (cT1c, cN0, cM0) - Signed by Gualberto Perez PA-C on 5/11/2023  Histopathologic type: Adenocarcinoma, NOS  Histologic grade (G): G2  Histologic grading system: 4 grade system  Lymph-vascular invasion (LVI): LVI not present (absent)/not identified       Cancer Staging   Primary adenocarcinoma of lower lobe of left lung [facility])  Staging form: Lung, AJCC 8th Edition  - Pathologic stage from 4/26/2023: Stage IA3 (pT1c, pN0, cM0) - Signed by Gabi Garcia PA-C on 6/29/2023  Histopathologic type: Adenocarcinoma, NOS  Histologic grade (G): G2  Histologic grading system: 4 grade system  Laterality: Left  Tumor size (mm): 30     Patient ID: Vaughn Liang is a 58 y o  female  ECOG 0    HPI     Ms Kirsty Tobar is a 60year old female who is s/p a left VATS lower lobe wedge resection and completion lobecotmy on 4/26/2023 for Stage IA3 adenocarcinoma who presents today for routine post-operative care  She was last seen in our office on 5/11/2023 at which point she was recovering well       On discussion today, she continues to do her Pel[employer] and walking several times a week  She is not taking any pain medications and not having any residual pain  She denies fever, chills, cough, or shortness of breath  The patient had no 30 day hospital readmission after dischage    The following portions of the patient's history were reviewed and updated as appropriate: allergies, current medications, past family history, past medical history, past social history, past surgical history and problem list     Review of Systems   Constitutional: Negative for chills, diaphoresis and unexpected weight change  HENT: Negative  Eyes: Negative  Respiratory: Negative for cough, shortness of breath and wheezing  Cardiovascular: Negative for chest pain and leg swelling  Gastrointestinal: Negative for abdominal pain, diarrhea and nausea  Endocrine: Negative  Genitourinary: Negative  Musculoskeletal: Negative  Skin: Negative  Allergic/Immunologic: Negative  Neurological: Negative  Hematological: Negative for adenopathy  Does not bruise/bleed easily  Psychiatric/Behavioral: Negative  All other systems reviewed and are negative  Objective:   Physical Exam  Vitals reviewed  Constitutional:       General: She is not in acute distress  Appearance: Normal appearance  She is not ill-appearing  HENT:      Head: Normocephalic and atraumatic  Nose: Nose normal       Mouth/Throat:      Mouth: Mucous membranes are moist    Eyes:      Extraocular Movements: Extraocular movements intact  Pupils: Pupils are equal, round, and reactive to light  Cardiovascular:      Rate and Rhythm: Normal rate and regular rhythm  Heart sounds: Normal heart sounds  Pulmonary:      Effort: Pulmonary effort is normal       Breath sounds: Normal breath sounds  No wheezing or rhonchi  Abdominal:      General: There is no distension  Palpations: Abdomen is soft  Musculoskeletal:         General: No swelling  Normal range of motion  "  Lymphadenopathy:      Cervical: No cervical adenopathy  Skin:     General: Skin is warm and dry  Comments: Left thoracoscopic incisions well healed  Neurological:      General: No focal deficit present  Mental Status: She is alert and oriented to person, place, and time     Psychiatric:         Mood and Affect: Mood normal      /76 (BP Location: Right arm, Patient Position: Sitting, Cuff Size: Standard)   Pulse (!) 52   Temp (!) 97 4 °F (36 3 °C) (Temporal)   Ht 5' 7 25\" (1 708 m)   Wt 80 9 kg (178 lb 5 6 oz)   LMP  (LMP Unknown)   SpO2 99%   BMI 27 73 kg/m²         "

## 2023-10-26 ENCOUNTER — HOSPITAL ENCOUNTER (OUTPATIENT)
Dept: RADIOLOGY | Facility: HOSPITAL | Age: 63
Discharge: HOME | End: 2023-10-26
Payer: COMMERCIAL

## 2023-10-26 DIAGNOSIS — C34.32 PRIMARY ADENOCARCINOMA OF LOWER LOBE OF LEFT LUNG (HCC): ICD-10-CM

## 2023-10-26 PROCEDURE — G1004 CDSM NDSC: HCPCS

## 2023-10-26 PROCEDURE — 71250 CT THORAX DX C-: CPT

## 2023-11-02 ENCOUNTER — OFFICE VISIT (OUTPATIENT)
Dept: CARDIAC SURGERY | Facility: CLINIC | Age: 63
End: 2023-11-02
Payer: COMMERCIAL

## 2023-11-02 VITALS
HEIGHT: 67 IN | BODY MASS INDEX: 27.96 KG/M2 | OXYGEN SATURATION: 97 % | SYSTOLIC BLOOD PRESSURE: 126 MMHG | RESPIRATION RATE: 15 BRPM | DIASTOLIC BLOOD PRESSURE: 81 MMHG | TEMPERATURE: 98.4 F | WEIGHT: 178.13 LBS | HEART RATE: 67 BPM

## 2023-11-02 DIAGNOSIS — C34.32 PRIMARY ADENOCARCINOMA OF LOWER LOBE OF LEFT LUNG (HCC): Primary | ICD-10-CM

## 2023-11-02 PROCEDURE — 99213 OFFICE O/P EST LOW 20 MIN: CPT | Performed by: THORACIC SURGERY (CARDIOTHORACIC VASCULAR SURGERY)

## 2023-11-02 NOTE — ASSESSMENT & PLAN NOTE
Monica Esposito is a 50-year-old female who is known to me. She underwent a VATS left lower lobectomy on 4/26/2023. She presents today for routine surveillance. Overall very happy with how she is doing. Her CT scan demonstrates stability no signs of any recurrence of her cancer. At this time, she will be maintained on a every 6-month surveillance schedule. I have ordered a CT scan of the chest for 6 months from now she will follow-up with me at that time.     Manuel Tran MD  Thoracic Surgery  (Available on [employer])  Office: [phone]

## 2023-11-02 NOTE — PROGRESS NOTES
Assessment/Plan:    Primary adenocarcinoma of lower lobe of left lung ([address])  Leydi Bai is a 41-year-old female who is known to me. She underwent a VATS left lower lobectomy on 4/26/2023. She presents today for routine surveillance. Overall very happy with how she is doing. Her CT scan demonstrates stability no signs of any recurrence of her cancer. At this time, she will be maintained on a every 6-month surveillance schedule. I have ordered a CT scan of the chest for 6 months from now she will follow-up with me at that time. Denise Elkins MD  Thoracic Surgery  (Available on [employer])  Office: [phone]       Diagnoses and all orders for this visit:    Primary adenocarcinoma of lower lobe of left lung [facility])  -     CT chest wo contrast; Future          Thoracic History   Cancer Staging   Primary adenocarcinoma of lower lobe of left lung [facility])  Staging form: Lung, AJCC 8th Edition  - Pathologic stage from 4/26/2023: Stage IA3 (pT1c, pN0, cM0) - Signed by Juan Howe PA-C on 6/29/2023  Histopathologic type: Adenocarcinoma, NOS  Histologic grade (G): G2  Histologic grading system: 4 grade system  Laterality: Left  Tumor size (mm): 30    Oncology History   Primary adenocarcinoma of lower lobe of left lung ([address])   3/2/2023 Initial Diagnosis    Primary adenocarcinoma of lower lobe of left lung ([address])     4/26/2023 -  Cancer Staged    Staging form: Lung, AJCC 8th Edition  - Pathologic stage from 4/26/2023: Stage IA3 (pT1c, pN0, cM0) - Signed by Juan Howe PA-C on 6/29/2023  Histopathologic type: Adenocarcinoma, NOS  Histologic grade (G): G2  Histologic grading system: 4 grade system  Laterality: Left  Tumor size (mm): 30                Subjective:    Patient ID: Esdras Oakley is a 58 y.o. female. HPI  Leydi Bai is a 41-year-old female who is known to me. She underwent a VATS left lower lobectomy on 4/26/2023. She presents today for routine surveillance.   Today in the office, she is doing well.  She does report occasional pain that radiates under her breast anteriorly. She reports though that this is very infrequent and does not bother her significantly. She denies any recent upper respiratory infection or fevers or chills or pneumonia. She denies a chronic cough. She reports occasionally getting winded with exertion but she feels that this is because she is out of shape and not necessarily related to intrinsic pathology. I have personally reviewed her CT scan in PACS. This demonstrates stability no signs of any recurrence of her cancer. The following portions of the patient's history were reviewed and updated as appropriate: allergies, current medications, past family history, past medical history, past social history, past surgical history and problem list.    Review of Systems   Constitutional:  Negative for chills, fatigue, fever and unexpected weight change. HENT:  Positive for postnasal drip. Eyes: Negative. Negative for visual disturbance. Respiratory:  Negative for cough, shortness of breath and stridor. Cardiovascular:  Negative for chest pain. Gastrointestinal: Negative. Endocrine: Negative. Genitourinary: Negative. Musculoskeletal: Negative. Skin: Negative. Allergic/Immunologic: Positive for environmental allergies. Neurological:  Negative for dizziness, light-headedness and headaches. Hematological:  Negative for adenopathy. Psychiatric/Behavioral: Negative. Objective:   Physical Exam  Vitals and nursing note reviewed. Constitutional:       General: She is not in acute distress. Appearance: Normal appearance. She is well-developed and normal weight. She is not diaphoretic. HENT:      Head: Normocephalic and atraumatic. Nose: Nose normal. No congestion or rhinorrhea. Mouth/Throat:      Mouth: Mucous membranes are moist.      Pharynx: Oropharynx is clear. No oropharyngeal exudate.    Eyes:      General: No scleral icterus. Pupils: Pupils are equal, round, and reactive to light. Neck:      Trachea: No tracheal deviation. Cardiovascular:      Rate and Rhythm: Normal rate and regular rhythm. Pulses: Normal pulses. Heart sounds: Normal heart sounds. No murmur heard. Pulmonary:      Effort: Pulmonary effort is normal. No respiratory distress. Breath sounds: Normal breath sounds. No stridor. No wheezing or rales. Chest:      Chest wall: No tenderness. Abdominal:      General: Bowel sounds are normal. There is no distension. Palpations: Abdomen is soft. Tenderness: There is no abdominal tenderness. There is no rebound. Musculoskeletal:         General: Normal range of motion. Cervical back: Normal range of motion and neck supple. No muscular tenderness. Lymphadenopathy:      Cervical: No cervical adenopathy. Skin:     General: Skin is warm and dry. Coloration: Skin is not jaundiced or pale. Findings: No erythema or rash. Neurological:      General: No focal deficit present. Mental Status: She is alert and oriented to person, place, and time. Psychiatric:         Mood and Affect: Mood normal.         Behavior: Behavior normal.         Thought Content: Thought content normal.         Judgment: Judgment normal.     /81 (BP Location: Right arm, Patient Position: Sitting, Cuff Size: Standard)   Pulse 67   Temp 98.4 °F (36.9 °C) (Temporal)   Resp 15   Ht 5' 7.25" (1.708 m)   Wt 80.8 kg (178 lb 2.1 oz)   LMP  (LMP Unknown)   SpO2 97%   BMI 27.69 kg/m²     XR chest pa & lateral    Result Date: 5/10/2023  Impression Mild blunting of left costophrenic angle may represent postsurgical change or a less likely small effusion. No pneumothorax. Workstation performed: [id number]     XR chest pa & lateral    Result Date: 4/28/2023  Impression Left chest tube removal with no pneumothorax.  Workstation performed: [id number]      CT chest wo contrast    Result Date: 11/2/2023  Narrative CT CHEST WITHOUT IV CONTRAST INDICATION:   C34.32: Malignant neoplasm of lower lobe, left bronchus or lung. COMPARISON: CT 2/27/2023, PET/CT 3/16/2023 TECHNIQUE: CT examination of the chest was performed without intravenous contrast. Multiplanar 2D reformatted images were created from the source data. This examination, like all CT scans performed in the [facility], was performed utilizing techniques to minimize radiation dose exposure, including the use of iterative reconstruction and automated exposure control. Radiation dose length product (DLP) for this visit:  235.85 mGy-cm FINDINGS: LUNGS: Patient is status post interval left lower lobectomy. .  There is no tracheal or endobronchial lesion. PLEURA:  Unremarkable. HEART/GREAT VESSELS: Heart is unremarkable for patient's age. No thoracic aortic aneurysm. MEDIASTINUM AND KANDI:  Unremarkable. CHEST WALL AND LOWER NECK:  Unremarkable. VISUALIZED STRUCTURES IN THE UPPER ABDOMEN:  Unremarkable. OSSEOUS STRUCTURES:  No acute fracture or destructive osseous lesion. Impression Status post interval left lower lobectomy otherwise unremarkable study. Workstation performed: [id number]     CT chest wo contrast    Result Date: 3/1/2023  Narrative CT CHEST WITHOUT IV CONTRAST INDICATION:   R91.8: Other nonspecific abnormal finding of lung field. Per my review of the medical record, the abdomen CT from 2/9/2023 shows 1.8 cm left lower lobe nodule. Patient is scheduled to see thoracic surgery tomorrow. COMPARISON:  Abdomen CT 02/09/2023 and calcium scoring CT 06/04/2022. TECHNIQUE: Chest CT without intravenous contrast.  Axial, sagittal, coronal 2D reformats and coronal MIPS from source data. Radiation dose length product (DLP):  238.54 mGy-cm . Radiation dose exposure minimized using iterative reconstruction and automated exposure control.  FINDINGS: LUNGS:  2.0 cm superior segment lateral left lower lobe groundglass nodule with spiculation extending to the major fissure. What appears to be a solid component on the axial series instead has the appearance of adjacent atelectasis/scar on the sagittal and coronal series. Benign calcified granulomas. AIRWAYS: No significant filling defects. PLEURA:  Unremarkable. HEART/GREAT VESSELS:  Normal heart size. Mild coronary artery calcification indicating atherosclerotic heart disease. MEDIASTINUM AND KANDI:  Unremarkable. CHEST WALL AND LOWER NECK: Clips in the right breast. UPPER ABDOMEN:  Unremarkable. OSSEOUS STRUCTURES: Mild degenerative disease in the spine. Impression 2.0 cm lateral superior segment left lower lobe groundglass nodule with spiculation extending to the major fissure. There is no definite solid component; what appears to be a solid component in the axial series is instead due to adjacent atelectasis/scar in the sagittal and coronal series. This could be an adenocarcinoma spectrum lesion and follow-up is recommended with a chest CT with no contrast in 6-12 months per 2017 Fleischner Society guidelines. This study was marked in Epic for follow-up. Workstation performed: [id number]     No CT Chest,Abdomen,Pelvis results available for this patient. NM PET CT skull base to mid thigh    Result Date: 3/16/2023  Narrative PET/CT SCAN INDICATION: R91.8: Other nonspecific abnormal finding of lung field Abnormal CT of the chest demonstrating left lung opacity. MODIFIER: PI COMPARISON: CT chest, 2/27/2023 CELL TYPE:  N/A TECHNIQUE:   8.0 mCi F-18-FDG administered IV. Multiplanar attenuation corrected and non attenuation corrected PET images are available for interpretation, and contiguous, low dose, axial CT sections were obtained from the skull base through the femurs. Intravenous contrast material was not utilized.  This examination, like all CT scans performed in the [facility], was performed utilizing techniques to minimize radiation dose exposure, including the use of iterative reconstruction and automated exposure control. Fasting serum glucose: 95 mg/dl FINDINGS: VISUALIZED BRAIN:   No acute abnormalities are seen. HEAD/NECK:   There is a physiologic distribution of FDG. No FDG avid cervical adenopathy is seen. Focus of activity in the right neck image 49 felt to be related to strap muscle activity CT images: Unremarkable. CHEST:   - Irregular 2 cm lesion in the left lower lobe (image 97) with spiculation extending to the major fissure demonstrates no significant FDG activity. SUV max is 1.1, for comparison this is essentially the same as background lung activity -There is no evidence of FDG avid mediastinal or hilar adenopathy. CT images: No pleural or pericardial effusion. Mild coronary artery calcification present ABDOMEN:   No FDG avid soft tissue lesions are seen. CT images: No significant findings PELVIS: No FDG avid soft tissue lesions are seen. CT images: Unremarkable. OSSEOUS STRUCTURES: No FDG avid lesions are seen. CT images: No significant findings. Impression 1. The 2 cm left lower lobe lesion demonstrates no abnormal FDG avidity. Nevertheless based on its imaging characteristics this remains most concerning for adenocarcinoma spectrum lesion. Based on current Fleischner Society 2017 Guidelines on incidental pulmonary nodule, at minimum this showed be reassessed with noncontrast chest CT in 6 months time. 2. No evidence of FDG avid mediastinal or hilar adenopathy. No evidence of FDG avid distant metastatic disease. The examination demonstrates a finding requiring imaging follow-up and was logged as such in [employer]. Workstation performed: [id number]      No Barium Swallow results available for this patient.

## 2023-12-19 ENCOUNTER — HOSPITAL ENCOUNTER (OUTPATIENT)
Dept: MAMMOGRAPHY | Facility: IMAGING CENTER | Age: 63
Discharge: HOME | End: 2023-12-19
Payer: COMMERCIAL

## 2023-12-19 VITALS — BODY MASS INDEX: 27.94 KG/M2 | HEIGHT: 67 IN | WEIGHT: 178 LBS

## 2023-12-19 DIAGNOSIS — Z12.31 ENCOUNTER FOR SCREENING MAMMOGRAM FOR MALIGNANT NEOPLASM OF BREAST: ICD-10-CM

## 2023-12-19 PROCEDURE — 77063 BREAST TOMOSYNTHESIS BI: CPT

## 2023-12-19 PROCEDURE — 77067 SCR MAMMO BI INCL CAD: CPT

## 2024-01-10 ENCOUNTER — TELEPHONE (OUTPATIENT)
Dept: FAMILY MEDICINE CLINIC | Facility: CLINIC | Age: 64
End: 2024-01-10

## 2024-01-10 DIAGNOSIS — C34.32 PRIMARY ADENOCARCINOMA OF LOWER LOBE OF LEFT LUNG (HCC): ICD-10-CM

## 2024-01-10 DIAGNOSIS — Z79.899 ENCOUNTER FOR LONG-TERM (CURRENT) USE OF MEDICATIONS: ICD-10-CM

## 2024-01-10 DIAGNOSIS — F41.9 ANXIETY: Primary | ICD-10-CM

## 2024-01-10 DIAGNOSIS — M81.0 AGE-RELATED OSTEOPOROSIS WITHOUT CURRENT PATHOLOGICAL FRACTURE: ICD-10-CM

## 2024-01-10 DIAGNOSIS — E78.00 HYPERCHOLESTEROLEMIA: ICD-10-CM

## 2024-01-10 NOTE — TELEPHONE ENCOUNTER
Patient has an appointment at the end of the months and would like an order for blood work to be done prior to her appt.  She said she will go to [facility].

## 2024-01-22 LAB
25(OH)D3 SERPL-MCNC: 69 NG/ML (ref 30–100)
ALBUMIN SERPL-MCNC: 4.4 G/DL (ref 3.6–5.1)
ALBUMIN/GLOB SERPL: 1.4 (CALC) (ref 1–2.5)
ALP SERPL-CCNC: 47 U/L (ref 37–153)
ALT SERPL-CCNC: 19 U/L (ref 6–29)
APPEARANCE UR: CLEAR
AST SERPL-CCNC: 25 U/L (ref 10–35)
BASOPHILS # BLD AUTO: 83 CELLS/UL (ref 0–200)
BASOPHILS NFR BLD AUTO: 1.4 %
BILIRUB SERPL-MCNC: 0.6 MG/DL (ref 0.2–1.2)
BILIRUB UR QL STRIP: NEGATIVE
BUN SERPL-MCNC: 18 MG/DL (ref 7–25)
BUN/CREAT SERPL: NORMAL (CALC) (ref 6–22)
CALCIUM SERPL-MCNC: 9.9 MG/DL (ref 8.6–10.4)
CHLORIDE SERPL-SCNC: 100 MMOL/L (ref 98–110)
CHOLEST SERPL-MCNC: 300 MG/DL
CHOLEST/HDLC SERPL: 3.5 (CALC)
CO2 SERPL-SCNC: 31 MMOL/L (ref 20–32)
COLOR UR: YELLOW
CREAT SERPL-MCNC: 0.99 MG/DL (ref 0.5–1.05)
EOSINOPHIL # BLD AUTO: 212 CELLS/UL (ref 15–500)
EOSINOPHIL NFR BLD AUTO: 3.6 %
ERYTHROCYTE [DISTWIDTH] IN BLOOD BY AUTOMATED COUNT: 13.5 % (ref 11–15)
GFR/BSA.PRED SERPLBLD CYS-BASED-ARV: 64 ML/MIN/1.73M2
GLOBULIN SER CALC-MCNC: 3.1 G/DL (CALC) (ref 1.9–3.7)
GLUCOSE SERPL-MCNC: 86 MG/DL (ref 65–99)
GLUCOSE UR QL STRIP: NEGATIVE
HCT VFR BLD AUTO: 43.4 % (ref 35–45)
HDLC SERPL-MCNC: 85 MG/DL
HGB BLD-MCNC: 14.5 G/DL (ref 11.7–15.5)
HGB UR QL STRIP: NEGATIVE
KETONES UR QL STRIP: NEGATIVE
LDLC SERPL CALC-MCNC: 193 MG/DL (CALC)
LEUKOCYTE ESTERASE UR QL STRIP: NEGATIVE
LYMPHOCYTES # BLD AUTO: 2661 CELLS/UL (ref 850–3900)
LYMPHOCYTES NFR BLD AUTO: 45.1 %
MCH RBC QN AUTO: 30.1 PG (ref 27–33)
MCHC RBC AUTO-ENTMCNC: 33.4 G/DL (ref 32–36)
MCV RBC AUTO: 90.2 FL (ref 80–100)
MONOCYTES # BLD AUTO: 431 CELLS/UL (ref 200–950)
MONOCYTES NFR BLD AUTO: 7.3 %
NEUTROPHILS # BLD AUTO: 2513 CELLS/UL (ref 1500–7800)
NEUTROPHILS NFR BLD AUTO: 42.6 %
NITRITE UR QL STRIP: NEGATIVE
NONHDLC SERPL-MCNC: 215 MG/DL (CALC)
PH UR STRIP: 7.5 [PH] (ref 5–8)
PLATELET # BLD AUTO: 318 THOUSAND/UL (ref 140–400)
PMV BLD REES-ECKER: 10.6 FL (ref 7.5–12.5)
POTASSIUM SERPL-SCNC: 4.7 MMOL/L (ref 3.5–5.3)
PROT SERPL-MCNC: 7.5 G/DL (ref 6.1–8.1)
PROT UR QL STRIP: NEGATIVE
RBC # BLD AUTO: 4.81 MILLION/UL (ref 3.8–5.1)
SODIUM SERPL-SCNC: 138 MMOL/L (ref 135–146)
SP GR UR STRIP: 1.01 (ref 1–1.03)
TRIGL SERPL-MCNC: 101 MG/DL
TSH SERPL-ACNC: 2.63 MIU/L (ref 0.4–4.5)
WBC # BLD AUTO: 5.9 THOUSAND/UL (ref 3.8–10.8)

## 2024-01-26 ENCOUNTER — RA CDI HCC (OUTPATIENT)
Dept: OTHER | Facility: HOSPITAL | Age: 64
End: 2024-01-26

## 2024-01-29 ENCOUNTER — OFFICE VISIT (OUTPATIENT)
Dept: FAMILY MEDICINE CLINIC | Facility: CLINIC | Age: 64
End: 2024-01-29
Payer: COMMERCIAL

## 2024-01-29 VITALS
BODY MASS INDEX: 27.97 KG/M2 | HEIGHT: 67 IN | WEIGHT: 178.2 LBS | OXYGEN SATURATION: 98 % | SYSTOLIC BLOOD PRESSURE: 120 MMHG | DIASTOLIC BLOOD PRESSURE: 76 MMHG | RESPIRATION RATE: 15 BRPM | HEART RATE: 58 BPM

## 2024-01-29 DIAGNOSIS — Z23 NEED FOR VACCINATION: ICD-10-CM

## 2024-01-29 DIAGNOSIS — E78.00 HYPERCHOLESTEROLEMIA: ICD-10-CM

## 2024-01-29 DIAGNOSIS — F41.9 ANXIETY: ICD-10-CM

## 2024-01-29 DIAGNOSIS — M81.0 AGE-RELATED OSTEOPOROSIS WITHOUT CURRENT PATHOLOGICAL FRACTURE: Primary | ICD-10-CM

## 2024-01-29 DIAGNOSIS — C34.32 PRIMARY ADENOCARCINOMA OF LOWER LOBE OF LEFT LUNG (HCC): ICD-10-CM

## 2024-01-29 DIAGNOSIS — Z12.31 ENCOUNTER FOR SCREENING MAMMOGRAM FOR BREAST CANCER: ICD-10-CM

## 2024-01-29 DIAGNOSIS — Z12.11 ENCOUNTER FOR SCREENING COLONOSCOPY: ICD-10-CM

## 2024-01-29 DIAGNOSIS — Z12.11 SCREENING FOR MALIGNANT NEOPLASM OF COLON: ICD-10-CM

## 2024-01-29 DIAGNOSIS — N95.2 POSTMENOPAUSE ATROPHIC VAGINITIS: ICD-10-CM

## 2024-01-29 DIAGNOSIS — Z00.00 ROUTINE GENERAL MEDICAL EXAMINATION AT A HEALTH CARE FACILITY: ICD-10-CM

## 2024-01-29 PROCEDURE — 90471 IMMUNIZATION ADMIN: CPT

## 2024-01-29 PROCEDURE — 99214 OFFICE O/P EST MOD 30 MIN: CPT | Performed by: FAMILY MEDICINE

## 2024-01-29 PROCEDURE — 99396 PREV VISIT EST AGE 40-64: CPT | Performed by: FAMILY MEDICINE

## 2024-01-29 PROCEDURE — 90715 TDAP VACCINE 7 YRS/> IM: CPT

## 2024-01-29 NOTE — PATIENT INSTRUCTIONS
Recheck in 1 year sooner if needed    Adacel today    Mammogram in December  DEXA in November  Colonoscopy in May  Please at least schedule colonoscopy because it could take a couple months to get in   You can access the FollowMyHealth Patient Portal offered by [facility] by registering at the following website: [email or web address]. By joining [employer]’s FollowMyHealth portal, you will also be able to view your health information using other applications (apps) compatible with our system.

## 2024-01-29 NOTE — PROGRESS NOTES
SUBJECTIVE:-------------------------------------------------------------------------------------------  Patient is here for a well exam.         Deyanira Cox is a 63 y.o.  female and is here for routine health maintenance.     Health Maintenance   Topic Date Due    IPV Vaccine (2 of 3 - 4-dose series) 01/29/1965    DTaP,Tdap,and Td Vaccines (5 - Td or Tdap) 10/01/2022    COVID-19 Vaccine (7 - 2023-24 season) 11/23/2023    Annual Physical  01/27/2024    Hepatitis C Screening  02/25/2024 (Originally 1960)    HIV Screening  01/29/2026 (Originally 5/5/1975)    Colorectal Cancer Screening  05/19/2024    Breast Cancer Screening: Mammogram  12/19/2024    Depression Screening  01/29/2025    Cervical Cancer Screening  04/11/2025    DXA SCAN  11/10/2025    Zoster Vaccine  Completed    Influenza Vaccine  Completed    Pneumococcal Vaccine: Pediatrics (0 to 5 Years) and At-Risk Patients (6 to 64 Years)  Aged Out    HIB Vaccine  Aged Out    Hepatitis A Vaccine  Aged Out    Meningococcal ACWY Vaccine  Aged Out    HPV Vaccine  Aged Out     Immunization History   Administered Date(s) Administered    COVID-19 PFIZER VACCINE 0.3 ML IM 04/22/2021, 05/15/2021, 11/16/2021    COVID-19 Pfizer Vac BIVALENT Hi-sucrose 12 Yr+ IM 09/19/2022    COVID-19 Pfizer mRNA vacc PF hi-sucrose 12 yr and older (Comirnaty) 09/28/2023    COVID-19 Pfizer vac (Hi-sucrose, gray cap) 12 yr+ IM 04/15/2022    DTP 1960, 01/01/1964, 01/01/1965    Hep B, adult 01/01/1996    INFLUENZA 11/06/2006, 11/14/2007, 11/07/2008, 10/23/2009, 10/19/2010, 10/21/2011, 09/26/2012, 09/28/2012, 09/27/2013, 10/01/2013, 10/12/2015, 10/13/2016, 09/11/2018, 10/08/2021, 09/19/2022, 09/28/2023    IPV 01/01/1965    Influenza Quadrivalent 3 years and older 09/01/2018    Influenza Quadrivalent, 6-35 Months IM 10/12/2015, 10/13/2016, 09/14/2017    Influenza, injectable, quadrivalent, preservative free 0.5 mL 10/22/2019, 10/20/2020    Influenza, seasonal, injectable  "09/26/2012, 10/01/2013    MMR 01/01/1965    Td (adult), adsorbed 10/01/2012    Tdap 01/01/1965, 10/01/2012    Tuberculin Skin Test-PPD Intradermal 04/29/2014, 04/29/2014, 05/01/2014    Zoster 05/11/2017    Zoster Vaccine Recombinant 01/27/2023, 04/04/2023         Diet and Physical Activity  Diet: well balanced diet  Body mass index is 27.91 kg/m².  Exercise: daily      General Health  Hearing:  Is normal  Vision: sees ophthalmologist/optometrist yearly  Dental:  Sees dentist every 6 months      Smoker no        ASSESSMENT/PLAN:-------------------------------------------------------------------------------------------    Patient's physical is up-to-date   Immunizations; Adacel today  Cancer screenings;  Colonoscopy May this year  Mammogram December of this year      Patient instructed on exercise  Patient instructed on healthy choices for diet       NEXT PHYSICAL 1 YEAR          The following portions of the patient's history were reviewed and updated as appropriate: allergies, current medications, past family history, past medical history, past social history, past surgical history and problem list.      OBJECTIVE:---------------------------------------------------------------------------------------------------    /76   Pulse 58   Resp 15   Ht 5' 7\" (1.702 m)   Wt 80.8 kg (178 lb 3.2 oz)   LMP  (LMP Unknown)   SpO2 98%   BMI 27.91 kg/m²   Wt Readings from Last 3 Encounters:   01/29/24 80.8 kg (178 lb 3.2 oz)   12/19/23 80.7 kg (178 lb)   11/02/23 80.8 kg (178 lb 2.1 oz)     BP Readings from Last 3 Encounters:   01/29/24 120/76   11/02/23 126/81   06/29/23 138/76     Pulse Readings from Last 3 Encounters:   01/29/24 58   11/02/23 67   06/29/23 (!) 52     Body mass index is 27.91 kg/m².  Health Maintenance   Topic Date Due    IPV Vaccine (2 of 3 - 4-dose series) 01/29/1965    DTaP,Tdap,and Td Vaccines (5 - Td or Tdap) 10/01/2022    COVID-19 Vaccine (7 - 2023-24 season) 11/23/2023    Annual Physical  " 01/27/2024    Hepatitis C Screening  02/25/2024 (Originally 1960)    HIV Screening  01/29/2026 (Originally 5/5/1975)    Colorectal Cancer Screening  05/19/2024    Breast Cancer Screening: Mammogram  12/19/2024    Depression Screening  01/29/2025    Cervical Cancer Screening  04/11/2025    DXA SCAN  11/10/2025    Zoster Vaccine  Completed    Influenza Vaccine  Completed    Pneumococcal Vaccine: Pediatrics (0 to 5 Years) and At-Risk Patients (6 to 64 Years)  Aged Out    HIB Vaccine  Aged Out    Hepatitis A Vaccine  Aged Out    Meningococcal ACWY Vaccine  Aged Out    HPV Vaccine  Aged Out       ROS:   12 point review of systems negative            PHYSICAL EXAM:    Gen.  No acute distress well-appearing well-nourished appears stated age    Mental status  Good judgment and insight oriented to time person and place, recent and remote memory intact mood and affect normal cooperative and patient is reasonable    HEENT  PERRLA 3 mm, EOMI without nystagmus, TMs clear, turbinates open pink no exudate, pharynx benign, tongue midline    Neck   supple no masses trachea midline positive click normal carotid upstrokes with no bruits    Cor  Regular rhythm without ectopy or murmur, no S3-S4, normal palpation that is no heave lift or thrill    Vascular  No edema, good pedal pulses    Lungs  CTA bilaterally in no respiratory distress no wheezes rhonchi or rales, normal to palpation no tactile fremitus    Abdomen  Soft, no palpable masses, no hepatosplenomegaly, normal bowel sounds, nontender    Lymphatics  No palpable nodes in the neck, supraclavicular area, axilla, or groin     Musculoskeletal  No clubbing cyanosis or edema muscle tone normal 12 point review of systems is negative    Skin  no rashes or abnormal appearing lesions    Neuro  Normal ambulation, cranial nerves 2-12 grossly intact, higher functioning with reasoning intact.

## 2024-01-29 NOTE — PROGRESS NOTES
ASSESSMENT/PLAN:    Primary adenocarcinoma of the left lower lobe of the lung  Treated with surgery only     Hypercholesterolemia  Crazy high at 300 from 272 and LDL of 193 from 178  However her HDLs are always wonderful greater than 80  Coronary calcium was only 13  Coronary risk calculator puts you at low risk for heart disease of 4.6  Therefore we will not treat a number but patient will try to decrease on her cheese content  She has had elevated cholesterols under our watch since at least 2017  We may consider treatment as risk factors increase     Osteoporosis  Patient is on Actonel calcium vitamin D  She also exercises  Her next DEXA will be November 2024  Order given     Anxiety  Doing well with CBD     Recheck in 1 year sooner if needed  Adacel today  Mammogram in December  DEXA in November  Colonoscopy in May  Please at least schedule colonoscopy because it could take a couple months to get in              Depression Screening and Follow-up Plan: Patient was screened for depression during today's encounter. They screened negative with a PHQ-2 score of 0.           Health Maintenance   Topic Date Due    IPV Vaccine (2 of 3 - 4-dose series) [birth year]/29/1965    DTaP,Tdap,and Td Vaccines (5 - Td or Tdap) 10/01/2022    COVID-19 Vaccine (7 - 2023-24 season) 11/23/2023    Annual Physical  01/27/2024    Hepatitis C Screening  02/25/2024 (Originally 1960)    HIV Screening  01/29/2026 (Originally 5/5/1975)    Colorectal Cancer Screening  05/19/2024    Breast Cancer Screening: Mammogram  12/19/2024    Depression Screening  01/29/2025    Cervical Cancer Screening  04/11/2025    DXA SCAN  11/10/2025    Zoster Vaccine  Completed    Influenza Vaccine  Completed    Pneumococcal Vaccine: Pediatrics (0 to 5 Years) and At-Risk Patients (6 to 64 Years)  Aged Out    HIB Vaccine  Aged Out    Hepatitis A Vaccine  Aged Out    Meningococcal ACWY Vaccine  Aged Out    HPV Vaccine  Aged Out         Problem List as of 1/29/2024 Reviewed:  11/2/2023  9:34 AM by Brynn Naik MD      Age-related osteoporosis without current pathological fracture    Anxiety    BMI 27.0-27.9,adult    DVT (deep vein thrombosis) in pregnancy    Family history of pancreatic cancer    Hypercholesterolemia    Postmenopause atrophic vaginitis    Primary adenocarcinoma of lower lobe of left lung (HCC)    Last Assessment & Plan 11/2/2023 Office Visit Written 11/2/2023  9:33 AM by Brynn Naik MD     Deyanira is a 63-year-old female who is known to me.  She underwent a VATS left lower lobectomy on 4/26/2023.  She presents today for routine surveillance.    Overall very happy with how she is doing.  Her CT scan demonstrates stability no signs of any recurrence of her cancer.  At this time, she will be maintained on a every 6-month surveillance schedule.  I have ordered a CT scan of the chest for 6 months from now she will follow-up with me at that time.    Brynn Naik MD  Thoracic Surgery  (Available on [employer])  Office: [phone]           Vitamin D deficiency         Subjective:   Chief Complaint   Patient presents with    Physical Exam     Patient is here for yearly check    Since last visit she found a serendipitous lung cancer while looking for pancreatic cancer  This was a surgical cure and she has been fine since that time  She is following with thoracic surgery every 6 months for the time being    She also has new blood work for us to review at this time        patient ID: Deyanira Cox is a 63 y.o. female.    Patient's past medical history, surgical history, family history, social history, and Tobacco history reviewed with patient.     MED LIST WAS REVIEWED AND UPDATED    ROS  As per HPI  Rest of 12 point review of systems negative     Objective:      VITALS:  Wt Readings from Last 3 Encounters:   01/29/24 80.8 kg (178 lb 3.2 oz)   12/19/23 80.7 kg (178 lb)   11/02/23 80.8 kg (178 lb 2.1 oz)     BP Readings from Last 3 Encounters:    01/29/24 120/76   11/02/23 126/81   06/29/23 138/76     Pulse Readings from Last 3 Encounters:   01/29/24 58   11/02/23 67   06/29/23 (!) 52     Body mass index is 27.91 kg/m².    Laboratory Results:   All pertinent labs and studies were reviewed with patient during this office visit with highlights of the results contained in this note in the ASSESSMENT AND PLAN section       Physical Exam      Gen.  No acute distress well-appearing well-nourished appears stated age    Mental status  Good judgment and insight oriented to time person and place, recent and remote memory intact mood and affect normal cooperative and patient is reasonable    HEENT  PERRLA 3 mm, EOMI without nystagmus, normocephalic atraumatic without facial weakness    Neck   supple no masses trachea midline positive click normal carotid upstrokes with no bruits    Cor  Regular rhythm without ectopy or murmur, no S3-S4, normal palpation that is no heave lift or thrill    Vascular  No edema, good pedal pulses    Lungs  CTA bilaterally in no respiratory distress no wheezes rhonchi or rales, normal to palpation no tactile fremitus    Abdomen  Soft, no palpable masses, no hepatosplenomegaly, normal bowel sounds, nontender    Lymphatics  No palpable nodes in the neck, supraclavicular area, axilla, or groin    Musculoskeletal  No clubbing cyanosis or edema muscle tone normal    Skin  no rashes or abnormal appearing lesions    Neuro  Normal ambulation, cranial nerves 2-12 grossly intact, higher functioning with reasoning intact.

## 2024-02-09 ENCOUNTER — TELEPHONE (OUTPATIENT)
Age: 64
End: 2024-02-09

## 2024-02-09 ENCOUNTER — PREP FOR PROCEDURE (OUTPATIENT)
Age: 64
End: 2024-02-09

## 2024-02-09 DIAGNOSIS — Z86.010 HX OF COLONIC POLYPS: Primary | ICD-10-CM

## 2024-02-09 NOTE — TELEPHONE ENCOUNTER
02/09/24  Screened by: Joanne Najera MA    Referring Provider 3 yr repeat    Pre- Screening:     There is no height or weight on file to calculate BMI.  Has patient been referred for a routine screening Colonoscopy? no  Is the patient between 45-75 years old? no      Previous Colonoscopy no   If yes:    Date: 5/19/2024     Facility: [facility]    Reason: SCREENING        Does the patient want to see a Gastroenterologist prior to their procedure OR are they having any GI symptoms? no    Has the patient been hospitalized or had abdominal surgery in the past 6 months? no    Does the patient use supplemental oxygen? no    Does the patient take Coumadin, Lovenox, Plavix, Elliquis, Xarelto, or other blood thinning medication? no    Has the patient had a stroke, cardiac event, or stent placed in the past year? no      If patient is between 45yrs - 49yrs, please advise patient that we will have to confirm benefits & coverage with their insurance company for a routine screening colonoscopy.

## 2024-02-09 NOTE — TELEPHONE ENCOUNTER
Scheduled date of colonoscopy (as of today): 6/6/2024  Physician performing colonoscopy: DR [facility]  Location of colonoscopy: [facility]  Bowel prep reviewed with patient: MIRALAX/DULCOLAX  Instructions reviewed with patient by: Joanne via telephone. Procedure directions sent via [employer].  Clearances: n/a

## 2024-04-16 ENCOUNTER — ANNUAL EXAM (OUTPATIENT)
Dept: OBGYN CLINIC | Facility: CLINIC | Age: 64
End: 2024-04-16
Payer: COMMERCIAL

## 2024-04-16 VITALS
DIASTOLIC BLOOD PRESSURE: 80 MMHG | BODY MASS INDEX: 27.62 KG/M2 | HEIGHT: 67 IN | WEIGHT: 176 LBS | SYSTOLIC BLOOD PRESSURE: 120 MMHG

## 2024-04-16 DIAGNOSIS — Z01.419 WELL WOMAN EXAM WITH ROUTINE GYNECOLOGICAL EXAM: Primary | ICD-10-CM

## 2024-04-16 PROCEDURE — S0612 ANNUAL GYNECOLOGICAL EXAMINA: HCPCS | Performed by: PHYSICIAN ASSISTANT

## 2024-04-16 NOTE — PATIENT INSTRUCTIONS
For vaginal dryness:    Coconut oil (organic, pure, unscented) as needed for moisture or lubrication. ( Do not use if allergic)    KeyE suppository  Revaree hyaluronic acid suppository   Replens moisture restore external comfort gel daily ( use as directed on the box)     Replens long lasting vaginal moisturizer  ( use as directed on the box)       For Vaginal Lubrication:     Coconut oil (Do not use if allergic)           Silicone based lubricant:  Uber Lube  AstroGlide  Replens silky smooth lubricant, premium silicone based lubricant for intercourse. ( use as directed, a small amount will provide an enhanced natural feeling)

## 2024-04-16 NOTE — PROGRESS NOTES
Assessment   63 y.o.  presenting for annual exam.     Plan   Diagnoses and all orders for this visit:    Well woman exam with routine gynecological exam        Pap up to date  Mammo ordered by PCP   Colonoscopy - scheduled   DXA- ordered by PCP   Vaginal dryness- We reviewed various tx options to include use of lubricant (water and silicone based), coconut oil, hyaluronic acid suppository, vitamin e suppository, and topical estrogen cream. Pt is interested in trying these OTC measures and will schedule f/u if not relieved with this.     Perineal hygiene reviewed. Weight bearing exercises minium of 150 mins/weekly advised. Kegel exercises recommended.   SBE encouraged, A yearly mammogram is recommended for breast cancer screening starting at age 40. ASCCP guidelines reviewed. Calcium/ Vit D dietary requirements discussed.   Advised to call with any issues, all concerns & questions addressed.   See provided information in your after visit summary     F/U Annually and PRN    Results will be released to [employer] [facility] [employer], if abnormal will call or message to review and discuss treatment plan.     __________________________________________________________________    Subjective     Deyanirarabia Cox is a 63 y.o.  presenting for annual exam.     She does struggle with some vaginal dryness and discomfort with intercourse. She does use a lubricant which does seem to help.    She was diagnosed with primary adenocarcinoma of the lower lobe of left lung last year. She underwent a VATS left lower lobectomy on 2023. Followed by cardiothoracic surgery - she is on a 6 month surveillance schedule.     She has a history of a DVT and is not an estrogen candidate.      SCREENING  Last Pap: 2023 neg/neg  Last Mammo: 2023 birads 1  Last Colonoscopy: 2021 3 year recall due to polyps ; scheduled  Last DEXA: 2022 osteoporosis; managed on Actonel by her PCP      GYN  , no VB    Sexually active: Yes - single  partner - male    Hx Abnormal pap: denies  We reviewed ASCCP guidelines for Pap testing today.    Denies vaginal discharge, itching, odor, dyspareunia, pelvic pain and vulvar/vaginal symptoms      OB           Complaints: denies   Denies urgency, frequency, hematuria, leakage / change in stream, difficulty urinating.       BREAST  Complaints: denies   Denies: breast lump, breast tenderness, nipple discharge, skin color change, and skin lesion(s)  Personal hx: right breast biopsy in       Pertinent Family Hx:   Family hx of breast cancer: mother, paternal grandmother, maternal aunt (age and gene status unknown)     Pt did have gene testing and all testing was negative    Family hx of ovarian cancer: no  Family hx of colon cancer: no      GENERAL  PMH reviewed/updated and is as below.   Patient does follow with a PCP.    SOCIAL  Smoking: no; never  Alcohol: occasional  Drug: no  Occupation: not currently       Past Medical History:   Diagnosis Date    Actinic keratosis     last assessed - 2017    Anemia     BRCA1 negative     neg results    Breast lump     last assessed - 2017    Chest tightness or pressure     Resolved - 2017    DVT (deep vein thrombosis) in pregnancy     Ground glass opacity present on imaging of lung     Shoulder pain, left     last assessed - [id number]       Past Surgical History:   Procedure Laterality Date    BREAST BIOPSY Right     neg results     SECTION      x 2    COLONOSCOPY      Complete Colonoscopy    LUNG LOBECTOMY Left 2023    Procedure: completion lower lobectomy;  Surgeon: Brynn Naik MD;  Location: [facility];  Service: Thoracic    LUNG SEGMENTECTOMY Left 2023    Procedure: left thoracoscopic lower lobe wedge resection;  Surgeon: Brynn Naik MD;  [facility];  Service: Thoracic    SC NCC INCL FLUOR GDNCE DX W/CELL WASHG SPX N/A 2023    Procedure: BRONCHOSCOPY FLEXIBLE;  Surgeon: Brynn Naik  MD;  [facility]: [facility];  Service: Thoracic    [place] THORACOSCOPY W/THERA WEDGE RESEXN INITIAL UNILAT Left 4/26/2023    Procedure: THORACOSCOPY VIDEO ASSISTED SURGERY (VATS);  Surgeon: Brynn Naik MD;  [facility];  Service: Thoracic    TONSILLECTOMY           Current Outpatient Medications:     Ascorbic Acid (Vitamin C) 500 MG CAPS, Take by mouth daily, Disp: , Rfl:     aspirin 81 mg chewable tablet, Chew 1 tablet daily, Disp: , Rfl:     Calcium-Magnesium (RODRICK/MAG CITRATE) 250-125 MG TABS, 3 daily, Disp: , Rfl:     Glucosamine-Chondroitin 250-200 MG TABS, Take 2 tablets by mouth daily, Disp: , Rfl:     Iron-Vitamin C 100-250 MG TABS, Take 1 tablet by mouth once a week, Disp: , Rfl:     Multiple Vitamins-Minerals (WOMENS ONE DAILY) TABS, Take 1 tablet by mouth daily, Disp: , Rfl:     Omega-3 Fatty Acids (OMEGA-3 FISH OIL) 1000 MG CAPS, Take 1 capsule by mouth daily, Disp: , Rfl:     polyethylene glycol (GLYCOLAX) 17 GM/SCOOP powder, Take 17 g by mouth daily as needed, Disp: , Rfl:     Probiotic Product (PROBIOTIC-10 PO), Take by mouth 2 (two) times a day , Disp: , Rfl:     risedronate (ACTONEL) 35 mg tablet, TAKE 1 TAB EVERY 7 DAYS WITH WATER ON EMPTY STOMACH, NOTHING BY MOUTH OR LIE DOWN FOR 30 MINUTES., Disp: 12 tablet, Rfl: 3    zinc gluconate 50 mg tablet, Take 50 mg by mouth daily, Disp: , Rfl:     Allergies   Allergen Reactions    Other Allergic Rhinitis     Seasonal        Social History     Socioeconomic History    Marital status: /Civil Union     Spouse name: Not on file    Number of children: Not on file    Years of education: Not on file    Highest education level: Not on file   Occupational History    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   Tobacco Use    Smoking status: Never    Smokeless tobacco: Never   Vaping Use    Vaping status: Never Used   Substance and Sexual Activity    Alcohol use: Yes     Alcohol/week: 1.0 standard drink of alcohol    Drug use: No    Sexual activity: Yes      "Partners: Male     Birth control/protection: None   Other Topics Concern    Not on file   Social History Narrative    Activities:  Treadmill    Always uses seat belt    Caffeine use - 2 cups coffee per day    Exercise: Running    Exercise: Walking    Exercises moderately less than 3 times a week    Has smoke detectors    Weight training     Social Determinants of Health     Financial Resource Strain: Not on file   Food Insecurity: No Food Insecurity (4/27/2023)    Hunger Vital Sign     Worried About Running Out of Food in the Last Year: Never true     Ran Out of Food in the Last Year: Never true   Transportation Needs: No Transportation Needs (4/27/2023)    PRAPARE - Transportation     Lack of Transportation (Medical): No     Lack of Transportation (Non-Medical): No   Physical Activity: Not on file   Stress: Not on file   Social Connections: Not on file   Intimate Partner Violence: Not on file   Housing Stability: Unknown (4/27/2023)    Housing Stability Vital Sign     Unable to Pay for Housing in the Last Year: No     Number of Places Lived in the Last Year: Not on file     Unstable Housing in the Last Year: No       Review of Systems     ROS:  Constitutional: Negative for fatigue and unexpected weight change.   Respiratory: Negative for cough and shortness of breath.    Cardiovascular: Negative for chest pain and palpitations.   Gastrointestinal: Negative for abdominal pain and change in bowel habits  Breasts:  Negative, other than as noted above.   Genitourinary: Negative, other than as noted above.   Psychiatric: Negative for mood difficulties.      Objective   Vitals:    04/16/24 0900   BP: 120/80      Ht 5' 6.5\" (1.689 m)   Wt 79.8 kg (176 lb)   LMP  (LMP Unknown)   BMI 27.98 kg/m²     Physical Examination:    Patient appears well and is not in distress  Neck is supple without masses, no cervical or supraclavicular lymphadenopathy  Cardiovascular: regular rate and rhythm; no murmurs  Lungs: clear to " auscultation bilaterally; no wheezes  Breasts are symmetrical without mass, tenderness, nipple discharge, skin changes or adenopathy.   Abdomen is soft and nontender without masses.   External genitals are normal without lesions or rashes.  Urethral meatus and urethra are normal  Bladder is normal to palpation  Vagina is normal without discharge or bleeding.   Cervix is normal without discharge or lesion.   Uterus is normal, mobile, nontender without palpable mass.  Adnexa are normal, nontender, without palpable mass.

## 2024-04-16 NOTE — PROGRESS NOTES
Patient presents for a routine annual visit  PMB - none   Last Pap Smear- 2023 -/-  Mammogram- 2023 birads 1  Has referral   Colonoscopy- 2021 3 yr recall   Scheduled 2024    nonsmoker  Currently sexually active - one partner   No family history of uterine, ovarian, cervical   Mom, MA, and PGM breast cancer  No concerns/questions for today's visit

## 2024-05-02 ENCOUNTER — HOSPITAL ENCOUNTER (OUTPATIENT)
Dept: RADIOLOGY | Facility: HOSPITAL | Age: 64
Discharge: HOME | End: 2024-05-02
Attending: THORACIC SURGERY (CARDIOTHORACIC VASCULAR SURGERY)
Payer: MEDICARE

## 2024-05-02 DIAGNOSIS — C34.32 PRIMARY ADENOCARCINOMA OF LOWER LOBE OF LEFT LUNG (HCC): ICD-10-CM

## 2024-05-02 DIAGNOSIS — M81.0 AGE-RELATED OSTEOPOROSIS WITHOUT CURRENT PATHOLOGICAL FRACTURE: ICD-10-CM

## 2024-05-02 PROCEDURE — G1004 CDSM NDSC: HCPCS

## 2024-05-02 PROCEDURE — 71250 CT THORAX DX C-: CPT

## 2024-05-03 RX ORDER — RISEDRONATE SODIUM 35 MG/1
TABLET, FILM COATED ORAL
Qty: 12 TABLET | Refills: 3 | Status: SHIPPED | OUTPATIENT
Start: 2024-05-03

## 2024-05-09 ENCOUNTER — OFFICE VISIT (OUTPATIENT)
Dept: CARDIAC SURGERY | Facility: CLINIC | Age: 64
End: 2024-05-09
Payer: MEDICARE

## 2024-05-09 ENCOUNTER — TELEPHONE (OUTPATIENT)
Dept: HEMATOLOGY ONCOLOGY | Facility: CLINIC | Age: 64
End: 2024-05-09

## 2024-05-09 VITALS
DIASTOLIC BLOOD PRESSURE: 78 MMHG | HEART RATE: 52 BPM | BODY MASS INDEX: 27.2 KG/M2 | RESPIRATION RATE: 14 BRPM | WEIGHT: 173.28 LBS | OXYGEN SATURATION: 99 % | TEMPERATURE: 98.3 F | HEIGHT: 67 IN | SYSTOLIC BLOOD PRESSURE: 138 MMHG

## 2024-05-09 DIAGNOSIS — C34.32 PRIMARY ADENOCARCINOMA OF LOWER LOBE OF LEFT LUNG (HCC): Primary | ICD-10-CM

## 2024-05-09 PROCEDURE — 99214 OFFICE O/P EST MOD 30 MIN: CPT | Performed by: THORACIC SURGERY (CARDIOTHORACIC VASCULAR SURGERY)

## 2024-05-09 PROCEDURE — G2211 COMPLEX E/M VISIT ADD ON: HCPCS | Performed by: THORACIC SURGERY (CARDIOTHORACIC VASCULAR SURGERY)

## 2024-05-09 NOTE — PROGRESS NOTES
Assessment/Plan:    Primary adenocarcinoma of lower lobe of left lung (HCC)  Deyanira is a 64-year-old female who is well-known to me.  She underwent a VATS left lower lobectomy in April 2023.  She presents today for routine surveillance.  I personally reviewed her CT scan in PACS.  This demonstrates stability and no signs of any recurrence of her cancer.  She has a 3 mm right upper lobe groundglass opacity.  This has remained stable.    Today in the office, I am overall very happy with how she is doing.  She is now 1 year out from her lung cancer surgery.  She has done very well in the past year.  Her CT scan remains no evidence of disease.  At this time, she will be maintained on an every 6-month surveillance schedule.  This is in alignment with the NCCN guidelines.  I have ordered a CT scan of the chest today and she will come back and see me in 6 months after this has been completed.    Brynn Naik MD  Thoracic Surgery  (Available on [employer])  Office: [phone]       Diagnoses and all orders for this visit:    Primary adenocarcinoma of lower lobe of left lung (HCC)  -     Cancel: CT chest wo contrast; Future  -     CT chest wo contrast; Future          Thoracic History   Date: 4/26/23  Procedure: VATS LLobectomy     Cancer Staging   Primary adenocarcinoma of lower lobe of left lung (HCC)  Staging form: Lung, AJCC 8th Edition  - Pathologic stage from 4/26/2023: Stage IA3 (pT1c, pN0, cM0) - Signed by Tomasa Abreu PA-C on 6/29/2023  Histopathologic type: Adenocarcinoma, NOS  Histologic grade (G): G2  Histologic grading system: 4 grade system  Laterality: Left  Tumor size (mm): 30    Oncology History   Primary adenocarcinoma of lower lobe of left lung (HCC)   3/2/2023 Initial Diagnosis    Primary adenocarcinoma of lower lobe of left lung (HCC)     4/26/2023 -  Cancer Staged    Staging form: Lung, AJCC 8th Edition  - Pathologic stage from 4/26/2023: Stage IA3 (pT1c, pN0, cM0) - Signed by Tomasa  VIR[place]IA Abreu on 6/29/2023  Histopathologic type: Adenocarcinoma, NOS  Histologic grade (G): G2  Histologic grading system: 4 grade system  Laterality: Left  Tumor size (mm): 30                Subjective:    Patient ID: Deyanira Cox is a 64 y.o. female.    PATRICK Mcmillan is a 64-year-old female who is well-known to me.  She underwent a VATS left lower lobectomy in April 2023.  She presents today for routine surveillance.  I personally reviewed her CT scan in PACS.  This demonstrates stability and no signs of any recurrence of her cancer.  She has a 3 mm right upper lobe groundglass opacity.  This has remained stable.    Today in the office, the patient reports that she is doing well.  She denies any shortness of breath or chest pain.  She is able to do all of her activities of daily living without difficulty.  She reports that she had a common cold over the winter but recovered without incident.  She does report that she has a small lingering cough at night when she lies supine but that this only happens at night.    I have read all the most recent notes in the chart including from her PCP, Dr. Burgess    The following portions of the patient's history were reviewed and updated as appropriate: allergies, current medications, past family history, past medical history, past social history, past surgical history and problem list.    Review of Systems   Constitutional:  Negative for chills, fatigue, fever and unexpected weight change.   HENT: Negative.     Eyes: Negative.  Negative for visual disturbance.   Respiratory:  Positive for cough. Negative for shortness of breath and stridor.    Cardiovascular:  Negative for chest pain.   Gastrointestinal: Negative.    Endocrine: Negative.    Genitourinary: Negative.    Musculoskeletal: Negative.    Skin: Negative.    Neurological:  Negative for dizziness, light-headedness and headaches.   Hematological:  Negative for adenopathy.   Psychiatric/Behavioral: Negative.        "    Objective:   Physical Exam  Vitals and nursing note reviewed.   Constitutional:       General: She is not in acute distress.     Appearance: Normal appearance. She is well-developed and normal weight. She is not diaphoretic.   HENT:      Head: Normocephalic and atraumatic.      Nose: Nose normal. No congestion or rhinorrhea.      Mouth/Throat:      Mouth: Mucous membranes are moist.      Pharynx: Oropharynx is clear. No oropharyngeal exudate.   Eyes:      General: No scleral icterus.     Pupils: Pupils are equal, round, and reactive to light.   Neck:      Trachea: No tracheal deviation.   Cardiovascular:      Rate and Rhythm: Normal rate and regular rhythm.      Pulses: Normal pulses.      Heart sounds: Normal heart sounds. No murmur heard.  Pulmonary:      Effort: Pulmonary effort is normal. No respiratory distress.      Breath sounds: Normal breath sounds. No stridor. No wheezing or rales.   Chest:      Chest wall: No tenderness.   Abdominal:      General: Bowel sounds are normal. There is no distension.      Palpations: Abdomen is soft.      Tenderness: There is no abdominal tenderness. There is no rebound.   Musculoskeletal:         General: Normal range of motion.      Cervical back: Normal range of motion and neck supple. No muscular tenderness.   Lymphadenopathy:      Cervical: No cervical adenopathy.   Skin:     General: Skin is warm and dry.      Coloration: Skin is not jaundiced or pale.      Findings: No erythema or rash.   Neurological:      General: No focal deficit present.      Mental Status: She is alert and oriented to person, place, and time.   Psychiatric:         Mood and Affect: Mood normal.         Behavior: Behavior normal.         Thought Content: Thought content normal.         Judgment: Judgment normal.     /78 (BP Location: Left arm, Patient Position: Sitting, Cuff Size: Standard)   Pulse (!) 52   Temp 98.3 °F (36.8 °C) (Temporal)   Resp 14   Ht 5' 6.5\" (1.689 m)   Wt 78.6 " kg (173 lb 4.5 oz)   LMP  (LMP Unknown)   SpO2 99%   BMI 27.55 kg/m²     No Chest XR results available for this patient.   CT chest wo contrast    Result Date: 5/8/2024  Narrative CT CHEST WITHOUT IV CONTRAST INDICATION: C34.32: Malignant neoplasm of lower lobe, left bronchus or lung. COMPARISON: 10/26/2023 and 2/27/2023 TECHNIQUE: CT examination of the chest was performed without intravenous contrast. Multiplanar 2D reformatted images were created from the source data. This examination, like all CT scans performed in the [facility], was performed utilizing techniques to minimize radiation dose exposure, including the use of iterative reconstruction and automated exposure control. Radiation dose length product (DLP) for this visit: 218.89 mGy-cm FINDINGS: LUNGS: Left lower lobectomy. Similar lung volumes. No residual or recurrent disease. 3 mm groundglass opacity nodule in the right upper lobe inferiorly (302-58) has not changed from the prior 2 exams. Adjacent nodular scarring on the major fissure has not changed. No new nodules.. No pulmonary consolidation. No suspicious endobronchial filling defects. PLEURA: Unremarkable. HEART/GREAT VESSELS: Heart is unremarkable for patient's age. No thoracic aortic aneurysm. MEDIASTINUM AND KANDI: No mediastinal or hilar adenopathy. CHEST WALL AND LOWER NECK: Unremarkable. VISUALIZED STRUCTURES IN THE UPPER ABDOMEN: Unremarkable. OSSEOUS STRUCTURES: No acute fracture or destructive osseous lesion.     Impression Left lower lobectomy without residual or recurrent disease. 3 mm right upper lobe groundglass nodule with no solid component. This is not changed from February 2023; given history of prior pulmonary malignancy, recommend follow-up noncontrast chest CT in 1 year.. The study was marked in EPIC for significant notification. Workstation performed: [id number]     CT chest wo contrast    Result Date: 11/2/2023  Narrative CT CHEST WITHOUT IV CONTRAST  INDICATION:   C34.32: Malignant neoplasm of lower lobe, left bronchus or lung. COMPARISON: CT 2/27/2023, PET/CT 3/16/2023 TECHNIQUE: CT examination of the chest was performed without intravenous contrast. Multiplanar 2D reformatted images were created from the source data. This examination, like all CT scans performed in the [facility], was performed utilizing techniques to minimize radiation dose exposure, including the use of iterative reconstruction and automated exposure control. Radiation dose length product (DLP) for this visit:  235.85 mGy-cm FINDINGS: LUNGS: Patient is status post interval left lower lobectomy..  There is no tracheal or endobronchial lesion. PLEURA:  Unremarkable. HEART/GREAT VESSELS: Heart is unremarkable for patient's age.  No thoracic aortic aneurysm. MEDIASTINUM AND KANDI:  Unremarkable. CHEST WALL AND LOWER NECK:  Unremarkable. VISUALIZED STRUCTURES IN THE UPPER ABDOMEN:  Unremarkable. OSSEOUS STRUCTURES:  No acute fracture or destructive osseous lesion.     Impression Status post interval left lower lobectomy otherwise unremarkable study. Workstation performed: [id number]     No CT Chest,Abdomen,Pelvis results available for this patient.   No NM PET CT results available for this patient.   No Barium Swallow results available for this patient.

## 2024-05-09 NOTE — TELEPHONE ENCOUNTER
Patient Call    Who are you speaking with? Soni Thomas      If it is not the patient, are they listed on an active communication consent form? N/A   What is the reason for this call? Reporting significant findings on CT completed on 05/02.   Does this require a call back? N/A   If a call back is required, please list best call back number [phone]   If a call back is required, advise that a message will be forwarded to their care team and someone will return their call as soon as possible.   Did you relay this information to the patient? N/A

## 2024-05-09 NOTE — ASSESSMENT & PLAN NOTE
Deyanira is a 64-year-old female who is well-known to me.  She underwent a VATS left lower lobectomy in April 2023.  She presents today for routine surveillance.  I personally reviewed her CT scan in PACS.  This demonstrates stability and no signs of any recurrence of her cancer.  She has a 3 mm right upper lobe groundglass opacity.  This has remained stable.    Today in the office, I am overall very happy with how she is doing.  She is now 1 year out from her lung cancer surgery.  She has done very well in the past year.  Her CT scan remains no evidence of disease.  At this time, she will be maintained on an every 6-month surveillance schedule.  This is in alignment with the NCCN guidelines.  I have ordered a CT scan of the chest today and she will come back and see me in 6 months after this has been completed.    Brynn Naik MD  Thoracic Surgery  (Available on [employer])  Office: [phone]

## 2024-05-20 ENCOUNTER — PATIENT MESSAGE (OUTPATIENT)
Dept: GASTROENTEROLOGY | Facility: CLINIC | Age: 64
End: 2024-05-20

## 2024-05-23 ENCOUNTER — ANESTHESIA (OUTPATIENT)
Dept: ANESTHESIOLOGY | Facility: HOSPITAL | Age: 64
End: 2024-05-23

## 2024-05-23 ENCOUNTER — ANESTHESIA EVENT (OUTPATIENT)
Dept: ANESTHESIOLOGY | Facility: HOSPITAL | Age: 64
End: 2024-05-23

## 2024-06-05 ENCOUNTER — TELEPHONE (OUTPATIENT)
Dept: HEMATOLOGY ONCOLOGY | Facility: CLINIC | Age: 64
End: 2024-06-05

## 2024-06-05 NOTE — TELEPHONE ENCOUNTER
Appointment Change  Cancel, Reschedule, Change to Virtual      Who are you speaking with? Patient   If it is not the patient, is the caller listed on the communication consent form? N/A   Which provider is the appointment scheduled with? Dr. Naik   When was the original appointment scheduled?    Please list date and time 11/14/24 @ 9:30am   At which location is the appointment scheduled to take place? [place]   Was the appointment rescheduled?     Was the appointment changed from an in person visit to a virtual visit?    If so, please list the details of the change. Yes 11/21/24 @ 9:45am   What is the reason for the appointment change? Patients ct scan was rescheduled 11/14/24         Was STAR transport scheduled? no   Does STAR transport need to be scheduled for the new visit (if applicable) no   Does the patient need an infusion appointment rescheduled? no   Does the patient have an upcoming infusion appointment scheduled? If so, when? no   Is the patient undergoing chemotherapy? no   For appointments cancelled with less than 24 hours:  Was the no-show policy reviewed? yes

## 2024-06-06 ENCOUNTER — HOSPITAL ENCOUNTER (OUTPATIENT)
Dept: GASTROENTEROLOGY | Facility: AMBULARY SURGERY CENTER | Age: 64
Setting detail: OUTPATIENT SURGERY
End: 2024-06-06
Attending: INTERNAL MEDICINE
Payer: MEDICARE

## 2024-06-06 ENCOUNTER — ANESTHESIA (OUTPATIENT)
Dept: GASTROENTEROLOGY | Facility: AMBULARY SURGERY CENTER | Age: 64
End: 2024-06-06

## 2024-06-06 ENCOUNTER — ANESTHESIA EVENT (OUTPATIENT)
Dept: GASTROENTEROLOGY | Facility: AMBULARY SURGERY CENTER | Age: 64
End: 2024-06-06

## 2024-06-06 VITALS
DIASTOLIC BLOOD PRESSURE: 69 MMHG | OXYGEN SATURATION: 100 % | BODY MASS INDEX: 26.37 KG/M2 | SYSTOLIC BLOOD PRESSURE: 116 MMHG | HEART RATE: 50 BPM | RESPIRATION RATE: 18 BRPM | HEIGHT: 67 IN | TEMPERATURE: 97.3 F | WEIGHT: 168 LBS

## 2024-06-06 DIAGNOSIS — Z86.010 HX OF COLONIC POLYPS: ICD-10-CM

## 2024-06-06 PROCEDURE — 45385 COLONOSCOPY W/LESION REMOVAL: CPT | Performed by: INTERNAL MEDICINE

## 2024-06-06 PROCEDURE — 88305 TISSUE EXAM BY PATHOLOGIST: CPT | Performed by: PATHOLOGY

## 2024-06-06 PROCEDURE — 45390 COLONOSCOPY W/RESECTION: CPT | Performed by: INTERNAL MEDICINE

## 2024-06-06 RX ORDER — PROPOFOL 10 MG/ML
INJECTION, EMULSION INTRAVENOUS AS NEEDED
Status: DISCONTINUED | OUTPATIENT
Start: 2024-06-06 | End: 2024-06-06

## 2024-06-06 RX ORDER — SODIUM CHLORIDE, SODIUM LACTATE, POTASSIUM CHLORIDE, CALCIUM CHLORIDE 600; 310; 30; 20 MG/100ML; MG/100ML; MG/100ML; MG/100ML
INJECTION, SOLUTION INTRAVENOUS CONTINUOUS PRN
Status: DISCONTINUED | OUTPATIENT
Start: 2024-06-06 | End: 2024-06-06

## 2024-06-06 RX ORDER — LIDOCAINE HYDROCHLORIDE 10 MG/ML
INJECTION, SOLUTION EPIDURAL; INFILTRATION; INTRACAUDAL; PERINEURAL AS NEEDED
Status: DISCONTINUED | OUTPATIENT
Start: 2024-06-06 | End: 2024-06-06

## 2024-06-06 RX ADMIN — PROPOFOL 50 MG: 10 INJECTION, EMULSION INTRAVENOUS at 12:00

## 2024-06-06 RX ADMIN — PROPOFOL 50 MG: 10 INJECTION, EMULSION INTRAVENOUS at 11:57

## 2024-06-06 RX ADMIN — Medication 40 MG: at 11:49

## 2024-06-06 RX ADMIN — PROPOFOL 50 MG: 10 INJECTION, EMULSION INTRAVENOUS at 11:46

## 2024-06-06 RX ADMIN — PROPOFOL 30 MG: 10 INJECTION, EMULSION INTRAVENOUS at 11:50

## 2024-06-06 RX ADMIN — PROPOFOL 30 MG: 10 INJECTION, EMULSION INTRAVENOUS at 11:48

## 2024-06-06 RX ADMIN — PROPOFOL 20 MG: 10 INJECTION, EMULSION INTRAVENOUS at 11:53

## 2024-06-06 RX ADMIN — PROPOFOL 30 MG: 10 INJECTION, EMULSION INTRAVENOUS at 11:43

## 2024-06-06 RX ADMIN — PROPOFOL 20 MG: 10 INJECTION, EMULSION INTRAVENOUS at 11:41

## 2024-06-06 RX ADMIN — LIDOCAINE HYDROCHLORIDE 50 MG: 10 INJECTION, SOLUTION EPIDURAL; INFILTRATION; INTRACAUDAL; PERINEURAL at 11:39

## 2024-06-06 RX ADMIN — PROPOFOL 120 MG: 10 INJECTION, EMULSION INTRAVENOUS at 11:39

## 2024-06-06 RX ADMIN — SODIUM CHLORIDE, SODIUM LACTATE, POTASSIUM CHLORIDE, AND CALCIUM CHLORIDE: .6; .31; .03; .02 INJECTION, SOLUTION INTRAVENOUS at 10:42

## 2024-06-06 NOTE — ANESTHESIA POSTPROCEDURE EVALUATION
Post-Op Assessment Note    CV Status:  Stable  Pain Score: 0    Pain management: adequate       Mental Status:  Alert   Hydration Status:  Stable   PONV Controlled:  None   Airway Patency:  Patent     Post Op Vitals Reviewed: Yes    No anethesia notable event occurred.    Staff: CRNA               BP 98/57 (06/06/24 1208)    Temp (!) 97.3 °F (36.3 °C) (06/06/24 1208)    Pulse 60 (06/06/24 1208)   Resp 15 (06/06/24 1208)    SpO2 100 % (06/06/24 1208)

## 2024-06-06 NOTE — H&P
History and Physical -  [facility]  Deyanira Cox 64 y.o. female MRN: [id number]    HPI: Deyanira Cox is a 64 y.o. year old female who presents for screening colonoscopy -hx colon polyps      Review of Systems    Historical Information   Past Medical History:   Diagnosis Date    Actinic keratosis     last assessed - 2017    Anemia     BRCA1 negative     neg results    Breast lump     last assessed - 2017    Chest tightness or pressure     Resolved - 2017    Colon polyp     DVT (deep vein thrombosis) in pregnancy     Ground glass opacity present on imaging of lung     Shoulder pain, left     last assessed - 2016     Past Surgical History:   Procedure Laterality Date    BREAST BIOPSY Right     neg results     SECTION      x 2    COLONOSCOPY      Complete Colonoscopy    LUNG LOBECTOMY Left 2023    Procedure: completion lower lobectomy;  Surgeon: Brynn Naik MD;  Location: [facility];  Service: Thoracic    LUNG SEGMENTECTOMY Left 2023    Procedure: left thoracoscopic lower lobe wedge resection;  Surgeon: Brynn Naik MD;  Location: [facility];  Service: Thoracic    FL BRNCHSC INCL FLUOR GDNCE DX W/CELL WASHG SPX N/A 2023    Procedure: BRONCHOSCOPY FLEXIBLE;  Surgeon: Brynn Naik MD;  Location: [facility];  Service: Thoracic    FL THORACOSCOPY W/THERA WEDGE RESEXN INITIAL UNILAT Left 2023    Procedure: THORACOSCOPY VIDEO ASSISTED SURGERY (VATS);  Surgeon: Brynn Naik MD;  Location: [facility];  Service: Thoracic    TONSILLECTOMY       Social History   Social History     Substance and Sexual Activity   Alcohol Use Yes    Alcohol/week: 1.0 standard drink of alcohol     Social History     Substance and Sexual Activity   Drug Use No     Social History     Tobacco Use   Smoking Status Never   Smokeless Tobacco Never     Family History   Problem Relation Age of Onset    Anxiety disorder Mother     Breast cancer  "Mother         in 2000    Pancreatic cancer Mother 81    Parkinsonism Father     Hypertension Father     Prostate cancer Father         age unknown    Stroke Maternal Grandmother         Stroke    Coronary artery disease Maternal Grandfather     Stroke Maternal Grandfather         Heart disease    Breast cancer Paternal Grandmother         age unknown    Stroke Paternal Grandmother     Depression Daughter     Breast cancer Maternal Aunt         age unknown    Pancreatic cancer Maternal Aunt 83    No Known Problems Paternal Grandfather     Alcohol abuse Neg Hx     Substance Abuse Neg Hx        Meds/Allergies     Not in a hospital admission.    Allergies   Allergen Reactions    Other Allergic Rhinitis     Seasonal        Objective     /74   Pulse 62   Temp (!) 97.2 °F (36.2 °C) (Temporal)   Resp 16   Ht 5' 7\" (1.702 m)   Wt 76.2 kg (168 lb)   LMP  (LMP Unknown)   SpO2 100%   BMI 26.31 kg/m²       PHYSICAL EXAM    Gen: NAD  CV: RRR  CHEST: Clear  ABD: soft, NT/ND  EXT: no edema  Neuro: AAO      ASSESSMENT/PLAN:  This is a 64 y.o. year old female here for colonoscopy     PLAN:   Procedure: colonoscopy for personal history of colon polyps      "

## 2024-06-06 NOTE — ANESTHESIA PREPROCEDURE EVALUATION
Procedure:  COLONOSCOPY    Relevant Problems   CARDIO   (+) DVT (deep vein thrombosis) in pregnancy   (+) Hypercholesterolemia      NEURO/PSYCH   (+) Anxiety     Age-related osteoporosis without current pathological fracture   Anxiety   BMI 27.0-27.9,adult   DVT (deep vein thrombosis) in pregnancy   Family history of pancreatic cancer   Hypercholesterolemia   Postmenopause atrophic vaginitis   Primary adenocarcinoma of lower lobe of left lung (HCC)   Vitamin D deficiency        Surgical History   Current as of 24 1043   SECTION COLONOSCOPY   TONSILLECTOMY BREAST BIOPSY   OR THORACOSCOPY W/THERA WEDGE RESEXN INITIAL UNILAT OR BRNCHSC INCL FLUOR GDNCE DX W/CELL WASHG SPX   LUNG SEGMENTECTOMY LUNG LOBECTOMY         Physical Exam    Airway    Mallampati score: III  TM Distance: >3 FB  Neck ROM: full     Dental       Cardiovascular  Cardiovascular exam normal    Pulmonary  Pulmonary exam normal     Other Findings  post-pubertal.      Anesthesia Plan  ASA Score- 3     Anesthesia Type- IV sedation with anesthesia with ASA Monitors.         Additional Monitors:     Airway Plan:            Plan Factors-    Chart reviewed. EKG reviewed. Imaging results reviewed. Existing labs reviewed. Patient summary reviewed.    Patient is not a current smoker.              Induction- intravenous.    Postoperative Plan-         Informed Consent- Anesthetic plan and risks discussed with patient.  I personally reviewed this patient with the CRNA. Discussed and agreed on the Anesthesia Plan with the CRNA..

## 2024-06-11 PROCEDURE — 88305 TISSUE EXAM BY PATHOLOGIST: CPT | Performed by: PATHOLOGY

## 2024-11-14 ENCOUNTER — HOSPITAL ENCOUNTER (OUTPATIENT)
Dept: RADIOLOGY | Facility: HOSPITAL | Age: 64
Discharge: HOME | End: 2024-11-14
Attending: THORACIC SURGERY (CARDIOTHORACIC VASCULAR SURGERY)
Payer: MEDICARE

## 2024-11-14 DIAGNOSIS — C34.32 PRIMARY ADENOCARCINOMA OF LOWER LOBE OF LEFT LUNG (HCC): ICD-10-CM

## 2024-11-14 PROCEDURE — 71250 CT THORAX DX C-: CPT

## 2024-11-19 ENCOUNTER — HOSPITAL ENCOUNTER (OUTPATIENT)
Dept: BONE DENSITY | Facility: IMAGING CENTER | Age: 64
Discharge: HOME | End: 2024-11-19
Payer: MEDICARE

## 2024-11-19 VITALS — WEIGHT: 172.8 LBS | HEIGHT: 67 IN | BODY MASS INDEX: 27.12 KG/M2

## 2024-11-19 DIAGNOSIS — M81.0 AGE-RELATED OSTEOPOROSIS WITHOUT CURRENT PATHOLOGICAL FRACTURE: ICD-10-CM

## 2024-11-19 PROCEDURE — 77080 DXA BONE DENSITY AXIAL: CPT

## 2024-11-21 ENCOUNTER — OFFICE VISIT (OUTPATIENT)
Dept: CARDIAC SURGERY | Facility: CLINIC | Age: 64
End: 2024-11-21
Payer: MEDICARE

## 2024-11-21 ENCOUNTER — RESULTS FOLLOW-UP (OUTPATIENT)
Dept: FAMILY MEDICINE CLINIC | Facility: CLINIC | Age: 64
End: 2024-11-21

## 2024-11-21 VITALS
WEIGHT: 171.96 LBS | RESPIRATION RATE: 18 BRPM | HEART RATE: 56 BPM | BODY MASS INDEX: 26.99 KG/M2 | SYSTOLIC BLOOD PRESSURE: 134 MMHG | HEIGHT: 67 IN | DIASTOLIC BLOOD PRESSURE: 78 MMHG | TEMPERATURE: 97.9 F | OXYGEN SATURATION: 99 %

## 2024-11-21 DIAGNOSIS — C34.32 PRIMARY ADENOCARCINOMA OF LOWER LOBE OF LEFT LUNG (HCC): Primary | ICD-10-CM

## 2024-11-21 DIAGNOSIS — R91.8 LUNG NODULES: ICD-10-CM

## 2024-11-21 PROCEDURE — 99213 OFFICE O/P EST LOW 20 MIN: CPT | Performed by: THORACIC SURGERY (CARDIOTHORACIC VASCULAR SURGERY)

## 2024-11-21 PROCEDURE — G2211 COMPLEX E/M VISIT ADD ON: HCPCS | Performed by: THORACIC SURGERY (CARDIOTHORACIC VASCULAR SURGERY)

## 2024-11-21 NOTE — RESULT ENCOUNTER NOTE
No change in bone density  Patient should be on Actonel calcium vitamin D and exercise  Will discuss compliance next office visit

## 2024-11-21 NOTE — ASSESSMENT & PLAN NOTE
Deyanira is a 64-year-old female who is well-known to me.  She underwent a left VATS lower lobectomy in April 2023.  She presents today for routine surveillance.  I have personally reviewed her most recent CT scan in PACS.  This demonstrates no signs of any recurrence of her cancer.  She does have 2 new micronodules on the left side.  These are sub-3 mm and are very small.  She has other nodules on the right side that are all stable.    Today in the office, we discussed her most recent CT scan.  She is stable with no signs of any recurrence of her cancer.  At this time, we will keep her on a 6-month surveillance scan and she will follow-up with me at that time with a CT scan of the chest.

## 2024-11-21 NOTE — ASSESSMENT & PLAN NOTE
She has multiple lung nodules and of note, 2 new ones on her most recent CT scan.  These are micronodules and I am not particularly concerned about them.  We will continue to watch these at this time to ensure that they do not progress

## 2024-11-21 NOTE — PROGRESS NOTES
Assessment/Plan:    Primary adenocarcinoma of lower lobe of left lung (HCC)  Deyanira is a 64-year-old female who is well-known to me.  She underwent a left VATS lower lobectomy in April 2023.  She presents today for routine surveillance.  I have personally reviewed her most recent CT scan in PACS.  This demonstrates no signs of any recurrence of her cancer.  She does have 2 new micronodules on the left side.  These are sub-3 mm and are very small.  She has other nodules on the right side that are all stable.    Today in the office, we discussed her most recent CT scan.  She is stable with no signs of any recurrence of her cancer.  At this time, we will keep her on a 6-month surveillance scan and she will follow-up with me at that time with a CT scan of the chest.    Lung nodules  She has multiple lung nodules and of note, 2 new ones on her most recent CT scan.  These are micronodules and I am not particularly concerned about them.  We will continue to watch these at this time to ensure that they do not progress       Diagnoses and all orders for this visit:    Primary adenocarcinoma of lower lobe of left lung (HCC)  -     CT chest wo contrast; Future    Lung nodules          Thoracic History   Cancer Staging   Primary adenocarcinoma of lower lobe of left lung (HCC)  Staging form: Lung, AJCC 8th Edition  - Pathologic stage from 4/26/2023: Stage IA3 (pT1c, pN0, cM0) - Signed by Tomasa Abreu PA-C on 6/29/2023  Histopathologic type: Adenocarcinoma, NOS  Histologic grade (G): G2  Histologic grading system: 4 grade system  Laterality: Left  Tumor size (mm): 30    Oncology History   Primary adenocarcinoma of lower lobe of left lung (HCC)   3/2/2023 Initial Diagnosis    Primary adenocarcinoma of lower lobe of left lung (HCC)     4/26/2023 -  Cancer Staged    Staging form: Lung, AJCC 8th Edition  - Pathologic stage from 4/26/2023: Stage IA3 (pT1c, pN0, cM0) - Signed by Tomasa Abreu PA-C on 6/29/2023  Histopathologic  type: Adenocarcinoma, NOS  Histologic grade (G): G2  Histologic grading system: 4 grade system  Laterality: Left  Tumor size (mm): 30       4/26/2023 Surgery    L VATS lower lobectomy              Subjective:    Patient ID: Deyanira Cox is a 64 y.o. female.    PATRICK Mcmillan is a 64-year-old female who is well-known to me.  She underwent a left VATS lower lobectomy in April 2023.  She presents today for routine surveillance.  I have personally reviewed her most recent CT scan in PACS.  This demonstrates no signs of any recurrence of her cancer.  She does have 2 new micronodules on the left side.  These are sub-3 mm and are very small.  She has other nodules on the right side that are all stable.    Today in the office, Deyanira is doing well.  She denies any significant upper respiratory infection or pneumonia.  She denies any shortness of breath or chest pain.  She denies any chronic cough or hemoptysis.    I have reviewed all the most recent notes in the chart including the results of her colonoscopy.    The following portions of the patient's history were reviewed and updated as appropriate: allergies, current medications, past family history, past medical history, past social history, past surgical history and problem list.    Review of Systems   Constitutional:  Negative for chills, fatigue, fever and unexpected weight change.   HENT: Negative.     Eyes: Negative.  Negative for visual disturbance.   Respiratory:  Negative for cough, shortness of breath and stridor.    Cardiovascular:  Negative for chest pain.   Gastrointestinal: Negative.    Endocrine: Negative.    Genitourinary: Negative.    Musculoskeletal: Negative.    Skin: Negative.    Neurological:  Negative for dizziness, light-headedness and headaches.   Hematological:  Negative for adenopathy.   Psychiatric/Behavioral: Negative.           Objective:   Physical Exam  Vitals and nursing note reviewed.   Constitutional:       General: She is not in  "acute distress.     Appearance: Normal appearance. She is well-developed. She is not diaphoretic.   HENT:      Head: Normocephalic and atraumatic.      Nose: Nose normal. No congestion or rhinorrhea.      Mouth/Throat:      Mouth: Mucous membranes are moist.      Pharynx: Oropharynx is clear. No oropharyngeal exudate.   Eyes:      General: No scleral icterus.     Pupils: Pupils are equal, round, and reactive to light.   Neck:      Trachea: No tracheal deviation.   Cardiovascular:      Rate and Rhythm: Normal rate and regular rhythm.      Pulses: Normal pulses.      Heart sounds: Normal heart sounds. No murmur heard.  Pulmonary:      Effort: Pulmonary effort is normal. No respiratory distress.      Breath sounds: Normal breath sounds. No stridor. No wheezing or rales.   Chest:      Chest wall: No tenderness.   Abdominal:      General: Bowel sounds are normal. There is no distension.      Palpations: Abdomen is soft.      Tenderness: There is no abdominal tenderness. There is no rebound.   Musculoskeletal:         General: Normal range of motion.      Cervical back: Normal range of motion and neck supple. No muscular tenderness.   Lymphadenopathy:      Cervical: No cervical adenopathy.   Skin:     General: Skin is warm and dry.      Coloration: Skin is not jaundiced or pale.      Findings: No erythema or rash.   Neurological:      General: No focal deficit present.      Mental Status: She is alert and oriented to person, place, and time.   Psychiatric:         Mood and Affect: Mood normal.         Behavior: Behavior normal.         Thought Content: Thought content normal.         Judgment: Judgment normal.     /78 (Patient Position: Sitting, Cuff Size: Standard)   Pulse 56   Temp 97.9 °F (36.6 °C) (Temporal)   Resp 18   Ht 5' 7\" (1.702 m)   Wt 78 kg (171 lb 15.3 oz)   LMP  (LMP Unknown)   SpO2 99%   BMI 26.93 kg/m²     No Chest XR results available for this patient.   CT chest wo contrast  Result Date: " 11/19/2024  Narrative CT CHEST WITHOUT IV CONTRAST INDICATION: C34.32: Malignant neoplasm of lower lobe, left bronchus or lung. As per review of electronic medical record, history of adenocarcinoma of the left lower lobe status post left lower lobectomy in April 2023. COMPARISON: CT of the chest from 5/2/2024, 10/26/2023, and 2/27/2023,  PET/CT from 3/16/2023, and calcium score CT from 6/4/2022. TECHNIQUE: A CT examination of the chest was performed without intravenous contrast. Multiplanar 2D reformatted images were created from the source data. This examination, like all CT scans performed in the [facility], was performed utilizing techniques to minimize radiation dose exposure, including the use of iterative reconstruction and automated exposure control. Radiation dose length product (DLP) for this visit:  227.28 mGy-cm FINDINGS: BRONCHOPULMONARY:  Clear central airways. The patient is status post left lower lobectomy with unchanged appearance of the suture line and linear opacities at the left lung base likely representing areas of scarring. There is an accessory left fissure. No new airspace opacities. There is a 3 mm right upper lobe groundglass nodule (series 302 image 116), in retrospect unchanged dating back to the calcium score CT from June 2022. No associated solid component. There is a right lower lobe micronodule (series 302 image 146), unchanged dating back to February 2023. There is also a right lower lobe micronodule (series 302 image 154) unchanged from August 2023. Stability over this time period suggests benign etiology. There are 2 view left lower lung micronodules (series 302 image 155 and 172). There are several triangular shaped subcentimeter nodules along the right minor fissure, likely representing intrapulmonary lymph nodes. PLEURA:  No pleural effusions. No pneumothorax. HEART/GREAT VESSELS: The heart is normal in size. No pericardial effusion. Normal caliber thoracic  aorta. MEDIASTINUM/LYMPH NODES:  No axillary or mediastinal lymphadenopathy. Evaluation for hilar lymphadenopathy is limited without IV contrast. CHEST WALL AND LOWER NECK: Postsurgical changes are seen in the right breast. VISUALIZED STRUCTURES IN THE UPPER ABDOMEN:  Unremarkable. MUSCULOSKELETAL:  No focal aggressive osseous lesions.     Impression Status post left lower lobectomy with unchanged appearance of the suture line. No evidence of local recurrence. No new airspace opacities. 3 mm right upper lobe groundglass nodule without solid components unchanged dating back to June 2022. Attention on follow-up. Two new nonspecific left lower lung micronodules. Attention on follow-up. Workstation performed: [id number]     CT chest wo contrast  Result Date: 5/8/2024  Narrative CT CHEST WITHOUT IV CONTRAST INDICATION: C34.32: Malignant neoplasm of lower lobe, left bronchus or lung. COMPARISON: 10/26/2023 and 2/27/2023 TECHNIQUE: CT examination of the chest was performed without intravenous contrast. Multiplanar 2D reformatted images were created from the source data. This examination, like all CT scans performed in the [facility], was performed utilizing techniques to minimize radiation dose exposure, including the use of iterative reconstruction and automated exposure control. Radiation dose length product (DLP) for this visit: 218.89 mGy-cm FINDINGS: LUNGS: Left lower lobectomy. Similar lung volumes. No residual or recurrent disease. 3 mm groundglass opacity nodule in the right upper lobe inferiorly (302-58) has not changed from the prior 2 exams. Adjacent nodular scarring on the major fissure has not changed. No new nodules.. No pulmonary consolidation. No suspicious endobronchial filling defects. PLEURA: Unremarkable. HEART/GREAT VESSELS: Heart is unremarkable for patient's age. No thoracic aortic aneurysm. MEDIASTINUM AND KANDI: No mediastinal or hilar adenopathy. CHEST WALL AND LOWER NECK:  Unremarkable. VISUALIZED STRUCTURES IN THE UPPER ABDOMEN: Unremarkable. OSSEOUS STRUCTURES: No acute fracture or destructive osseous lesion.     Impression Left lower lobectomy without residual or recurrent disease. 3 mm right upper lobe groundglass nodule with no solid component. This is not changed from February 2023; given history of prior pulmonary malignancy, recommend follow-up noncontrast chest CT in 1 year.. The study was marked in EPIC for significant notification. Workstation performed: [id number]     No CT Chest,Abdomen,Pelvis results available for this patient.   No NM PET CT results available for this patient.   No Barium Swallow results available for this patient.

## 2024-12-05 ENCOUNTER — OFFICE VISIT (OUTPATIENT)
Dept: FAMILY MEDICINE CLINIC | Facility: CLINIC | Age: 64
End: 2024-12-05
Payer: MEDICARE

## 2024-12-05 VITALS
RESPIRATION RATE: 16 BRPM | HEART RATE: 70 BPM | SYSTOLIC BLOOD PRESSURE: 122 MMHG | WEIGHT: 173.3 LBS | BODY MASS INDEX: 27.2 KG/M2 | TEMPERATURE: 97.8 F | DIASTOLIC BLOOD PRESSURE: 80 MMHG | OXYGEN SATURATION: 98 % | HEIGHT: 67 IN

## 2024-12-05 DIAGNOSIS — L98.9 FACIAL SKIN LESION: Primary | ICD-10-CM

## 2024-12-05 PROCEDURE — 99213 OFFICE O/P EST LOW 20 MIN: CPT | Performed by: FAMILY MEDICINE

## 2024-12-05 PROCEDURE — 11311 SHAVE SKIN LESION 0.6-1.0 CM: CPT | Performed by: FAMILY MEDICINE

## 2024-12-05 NOTE — PROGRESS NOTES
Assessment/Plan:    Facial lesion/neoplasm  Biopsy for pathology  Possible basal cell versus squamous  Call patient with results and a plan           Subjective:   Deyanira Cox is a 64 y.o.female  Chief Complaint   Patient presents with    mole on face     Patient is here for an acute visit because of lesion she noticed on the right side of her nose that seem crusty and almost cauliflower-like.  She tried to move it and it did bleed so she is here for an opinion        Past medical history, social history, and family history reviewed as appropriate for the complaint of this patient.      MEDICATIONS REVIEWED AND UPDATED    10 point review of systems performed, the remainder of the ROS is negative except for what is noted in the history of chief complaint    Objective:    Vitals:    12/05/24 1350   BP: 122/80   Pulse: 70   Resp: 16   Temp: 97.8 °F (36.6 °C)   SpO2: 98%     Body mass index is 27.14 kg/m².    Physical Exam    General  Patient in no acute distress, well appearing, well nourished and appears stated age    Mental status  Good judgment and insight, oriented to time person and place, recent and remote memory is intact, mood and affect are normal, cooperative, and patient is reasonable.    Skin  Right lower eyelid about 1 cm from her inner canthus is a hyperkeratotic area about 2 mm with some fine raised skin  almost pearlescent    Shave lesion    Date/Time: 12/5/2024 1:45 PM    Performed by: Michelle Burgess DO  Authorized by: Michelle Burgess DO  Universal Protocol:  procedure performed by consultantConsent: Verbal consent obtained. Written consent obtained.  Risks and benefits: risks, benefits and alternatives were discussed  Consent given by: patient  Patient understanding: patient states understanding of the procedure being performed  Required items: required blood products, implants, devices, and special equipment available  Patient identity confirmed: verbally with patient    Number of Lesions:  1  Lesion 1:     Body area: head/neck    Head/neck location: R cheek    Initial size (mm): 3    Final defect size (mm): 6    Malignancy: malignancy unknown      Destruction method: shave removal      Comments:  Pathology sent  Patient tolerated procedure well  Oral and written directions given

## 2024-12-05 NOTE — PATIENT INSTRUCTIONS
Keep the area clean and dry for the rest of today  Tomorrow take off the dressing and take a shower or wash as she normally would.  Please wash this area with soap and water being careful to the area.  Please put antibiotic or Vaseline on the pad of a Band-Aid and cover this area.  If you cannot reach your self, please ask someone to assist you  Please do this for at least 10 days or until it is totally healed    Please call if you see any signs of infection such as:  Redness  Discharge  You have pain  If the area swollen

## 2024-12-16 ENCOUNTER — TELEPHONE (OUTPATIENT)
Age: 64
End: 2024-12-16

## 2024-12-16 NOTE — TELEPHONE ENCOUNTER
Spoke with Rachael to see if there is any update on pathology result. There is results in their system. Asked if they can faxed over for you to review. Once we receive them I will place on your desk.

## 2024-12-16 NOTE — TELEPHONE ENCOUNTER
Patient called asking if the results were in yet from the mole Dr. Burgess sent out for a biopsy?  Please advise.

## 2024-12-30 ENCOUNTER — TELEPHONE (OUTPATIENT)
Dept: FAMILY MEDICINE CLINIC | Facility: CLINIC | Age: 64
End: 2024-12-30

## 2024-12-30 NOTE — TELEPHONE ENCOUNTER
----- Message from Michelle Burgess DO sent at 12/30/2024 10:59 AM EST -----  Please call patient, I just received a paper copy of her lower cheek shave biopsy diagnosis and it is benign and is just a seborrheic keratosis which was inflamed.  No other follow-up is needed    Patient was informed.

## 2025-01-13 ENCOUNTER — HOSPITAL ENCOUNTER (OUTPATIENT)
Dept: MAMMOGRAPHY | Facility: IMAGING CENTER | Age: 65
Discharge: HOME | End: 2025-01-13
Payer: MEDICARE

## 2025-01-13 VITALS — WEIGHT: 175 LBS | BODY MASS INDEX: 27.47 KG/M2 | HEIGHT: 67 IN

## 2025-01-13 DIAGNOSIS — Z12.31 ENCOUNTER FOR SCREENING MAMMOGRAM FOR BREAST CANCER: ICD-10-CM

## 2025-01-13 PROCEDURE — 77063 BREAST TOMOSYNTHESIS BI: CPT

## 2025-01-13 PROCEDURE — 77067 SCR MAMMO BI INCL CAD: CPT

## 2025-01-21 ENCOUNTER — TELEPHONE (OUTPATIENT)
Age: 65
End: 2025-01-21

## 2025-01-21 DIAGNOSIS — E55.9 VITAMIN D DEFICIENCY: ICD-10-CM

## 2025-01-21 DIAGNOSIS — Z79.899 ENCOUNTER FOR LONG-TERM (CURRENT) USE OF MEDICATIONS: ICD-10-CM

## 2025-01-21 DIAGNOSIS — M81.0 AGE-RELATED OSTEOPOROSIS WITHOUT CURRENT PATHOLOGICAL FRACTURE: Primary | ICD-10-CM

## 2025-01-21 DIAGNOSIS — E78.00 HYPERCHOLESTEROLEMIA: ICD-10-CM

## 2025-01-21 NOTE — TELEPHONE ENCOUNTER
Pt requesting lab order for upcoming AWV with Dr Burgess on 2/3 sent to [employer] please. Thank you

## 2025-01-28 LAB
25(OH)D3 SERPL-MCNC: 77 NG/ML (ref 30–100)
ALBUMIN SERPL-MCNC: 4.4 G/DL (ref 3.6–5.1)
ALBUMIN/GLOB SERPL: 1.4 (CALC) (ref 1–2.5)
ALP SERPL-CCNC: 58 U/L (ref 37–153)
ALT SERPL-CCNC: 23 U/L (ref 6–29)
APPEARANCE UR: CLEAR
AST SERPL-CCNC: 26 U/L (ref 10–35)
BASOPHILS # BLD AUTO: 90 CELLS/UL (ref 0–200)
BASOPHILS NFR BLD AUTO: 1.4 %
BILIRUB SERPL-MCNC: 0.6 MG/DL (ref 0.2–1.2)
BILIRUB UR QL STRIP: NEGATIVE
BUN SERPL-MCNC: 20 MG/DL (ref 7–25)
BUN/CREAT SERPL: NORMAL (CALC) (ref 6–22)
CALCIUM SERPL-MCNC: 9.7 MG/DL (ref 8.6–10.4)
CHLORIDE SERPL-SCNC: 101 MMOL/L (ref 98–110)
CHOLEST SERPL-MCNC: 280 MG/DL
CHOLEST/HDLC SERPL: 3.3 (CALC)
CO2 SERPL-SCNC: 31 MMOL/L (ref 20–32)
COLOR UR: YELLOW
CREAT SERPL-MCNC: 0.96 MG/DL (ref 0.5–1.05)
EOSINOPHIL # BLD AUTO: 218 CELLS/UL (ref 15–500)
EOSINOPHIL NFR BLD AUTO: 3.4 %
ERYTHROCYTE [DISTWIDTH] IN BLOOD BY AUTOMATED COUNT: 13.3 % (ref 11–15)
GFR/BSA.PRED SERPLBLD CYS-BASED-ARV: 66 ML/MIN/1.73M2
GLOBULIN SER CALC-MCNC: 3.1 G/DL (CALC) (ref 1.9–3.7)
GLUCOSE SERPL-MCNC: 90 MG/DL (ref 65–99)
GLUCOSE UR QL STRIP: NEGATIVE
HCT VFR BLD AUTO: 44.6 % (ref 35–45)
HDLC SERPL-MCNC: 84 MG/DL
HGB BLD-MCNC: 14.9 G/DL (ref 11.7–15.5)
HGB UR QL STRIP: NEGATIVE
KETONES UR QL STRIP: NEGATIVE
LDLC SERPL CALC-MCNC: 180 MG/DL (CALC)
LEUKOCYTE ESTERASE UR QL STRIP: NEGATIVE
LYMPHOCYTES # BLD AUTO: 2995 CELLS/UL (ref 850–3900)
LYMPHOCYTES NFR BLD AUTO: 46.8 %
MCH RBC QN AUTO: 30.7 PG (ref 27–33)
MCHC RBC AUTO-ENTMCNC: 33.4 G/DL (ref 32–36)
MCV RBC AUTO: 92 FL (ref 80–100)
MONOCYTES # BLD AUTO: 627 CELLS/UL (ref 200–950)
MONOCYTES NFR BLD AUTO: 9.8 %
NEUTROPHILS # BLD AUTO: 2470 CELLS/UL (ref 1500–7800)
NEUTROPHILS NFR BLD AUTO: 38.6 %
NITRITE UR QL STRIP: NEGATIVE
NONHDLC SERPL-MCNC: 196 MG/DL (CALC)
PH UR STRIP: 8 [PH] (ref 5–8)
PLATELET # BLD AUTO: 314 THOUSAND/UL (ref 140–400)
PMV BLD REES-ECKER: 10.8 FL (ref 7.5–12.5)
POTASSIUM SERPL-SCNC: 5 MMOL/L (ref 3.5–5.3)
PROT SERPL-MCNC: 7.5 G/DL (ref 6.1–8.1)
PROT UR QL STRIP: NEGATIVE
RBC # BLD AUTO: 4.85 MILLION/UL (ref 3.8–5.1)
SODIUM SERPL-SCNC: 139 MMOL/L (ref 135–146)
SP GR UR STRIP: 1.01 (ref 1–1.03)
TRIGL SERPL-MCNC: 64 MG/DL
TSH SERPL-ACNC: 4.26 MIU/L (ref 0.4–4.5)
WBC # BLD AUTO: 6.4 THOUSAND/UL (ref 3.8–10.8)

## 2025-02-03 ENCOUNTER — OFFICE VISIT (OUTPATIENT)
Dept: FAMILY MEDICINE CLINIC | Facility: CLINIC | Age: 65
End: 2025-02-03
Payer: MEDICARE

## 2025-02-03 VITALS
OXYGEN SATURATION: 98 % | BODY MASS INDEX: 27.48 KG/M2 | TEMPERATURE: 98.2 F | WEIGHT: 175.1 LBS | HEART RATE: 58 BPM | DIASTOLIC BLOOD PRESSURE: 74 MMHG | RESPIRATION RATE: 16 BRPM | HEIGHT: 67 IN | SYSTOLIC BLOOD PRESSURE: 120 MMHG

## 2025-02-03 DIAGNOSIS — F41.9 ANXIETY: ICD-10-CM

## 2025-02-03 DIAGNOSIS — Z13.6 ENCOUNTER FOR ABDOMINAL AORTIC ANEURYSM SCREENING: ICD-10-CM

## 2025-02-03 DIAGNOSIS — Z00.00 WELCOME TO MEDICARE PREVENTIVE VISIT: ICD-10-CM

## 2025-02-03 DIAGNOSIS — M81.0 AGE-RELATED OSTEOPOROSIS WITHOUT CURRENT PATHOLOGICAL FRACTURE: Primary | ICD-10-CM

## 2025-02-03 DIAGNOSIS — Z12.31 ENCOUNTER FOR SCREENING MAMMOGRAM FOR BREAST CANCER: ICD-10-CM

## 2025-02-03 DIAGNOSIS — Z23 ENCOUNTER FOR IMMUNIZATION: ICD-10-CM

## 2025-02-03 DIAGNOSIS — R91.8 LUNG NODULES: ICD-10-CM

## 2025-02-03 DIAGNOSIS — C34.32 PRIMARY ADENOCARCINOMA OF LOWER LOBE OF LEFT LUNG (HCC): ICD-10-CM

## 2025-02-03 DIAGNOSIS — Z00.00 MEDICARE ANNUAL WELLNESS VISIT, INITIAL: ICD-10-CM

## 2025-02-03 DIAGNOSIS — E55.9 VITAMIN D DEFICIENCY: ICD-10-CM

## 2025-02-03 PROCEDURE — G2211 COMPLEX E/M VISIT ADD ON: HCPCS | Performed by: FAMILY MEDICINE

## 2025-02-03 PROCEDURE — 90677 PCV20 VACCINE IM: CPT

## 2025-02-03 PROCEDURE — G0438 PPPS, INITIAL VISIT: HCPCS | Performed by: FAMILY MEDICINE

## 2025-02-03 PROCEDURE — 99214 OFFICE O/P EST MOD 30 MIN: CPT | Performed by: FAMILY MEDICINE

## 2025-02-03 PROCEDURE — G0009 ADMIN PNEUMOCOCCAL VACCINE: HCPCS

## 2025-02-03 RX ORDER — RISEDRONATE SODIUM 35 MG/1
35 TABLET, FILM COATED ORAL
Qty: 12 TABLET | Refills: 3 | Status: SHIPPED | OUTPATIENT
Start: 2025-02-03

## 2025-02-03 NOTE — PATIENT INSTRUCTIONS
Medicare Preventive Visit Patient Instructions  Thank you for completing your Welcome to Medicare Visit or Medicare Annual Wellness Visit today. Your next wellness visit will be due in one year (2/4/2026).  The screening/preventive services that you may require over the next 5-10 years are detailed below. Some tests may not apply to you based off risk factors and/or age. Screening tests ordered at today's visit but not completed yet may show as past due. Also, please note that scanned in results may not display below.  Preventive Screenings:  Service Recommendations Previous Testing/Comments   Colorectal Cancer Screening  * Colonoscopy    * Fecal Occult Blood Test (FOBT)/Fecal Immunochemical Test (FIT)  * Fecal DNA/Cologuard Test  * Flexible Sigmoidoscopy Age: 45-75 years old   Colonoscopy: every 10 years (may be performed more frequently if at higher risk)  OR  FOBT/FIT: every 1 year  OR  Cologuard: every 3 years  OR  Sigmoidoscopy: every 5 years  Screening may be recommended earlier than age 45 if at higher risk for colorectal cancer. Also, an individualized decision between you and your healthcare provider will decide whether screening between the ages of 76-85 would be appropriate. Colonoscopy: 06/06/2024  FOBT/FIT: Not on file  Cologuard: Not on file  Sigmoidoscopy: Not on file    Screening Current     Breast Cancer Screening Age: 40+ years old  Frequency: every 1-2 years  Not required if history of left and right mastectomy Mammogram: 01/13/2025    Screening Current   Cervical Cancer Screening Between the ages of 21-29, pap smear recommended once every 3 years.   Between the ages of 30-65, can perform pap smear with HPV co-testing every 5 years.   Recommendations may differ for women with a history of total hysterectomy, cervical cancer, or abnormal pap smears in past. Pap Smear: 04/11/2023    Screening Not Indicated  Screening Current   Hepatitis C Screening Once for adults born between 1945 and 1965  More  frequently in patients at high risk for Hepatitis C Hep C Antibody: Not on file    Screening Current   Diabetes Screening 1-2 times per year if you're at risk for diabetes or have pre-diabetes Fasting glucose: 89 mg/dL (4/12/2023)  A1C: No results in last 5 years (No results in last 5 years)  Screening Current   Cholesterol Screening Once every 5 years if you don't have a lipid disorder. May order more often based on risk factors. Lipid panel: 01/27/2025    Screening Not Indicated  History Lipid Disorder     Other Preventive Screenings Covered by Medicare:  Abdominal Aortic Aneurysm (AAA) Screening: covered once if your at risk. You're considered to be at risk if you have a family history of AAA.  Lung Cancer Screening: covers low dose CT scan once per year if you meet all of the following conditions: (1) Age 55-77; (2) No signs or symptoms of lung cancer; (3) Current smoker or have quit smoking within the last 15 years; (4) You have a tobacco smoking history of at least 20 pack years (packs per day multiplied by number of years you smoked); (5) You get a written order from a healthcare provider.  Glaucoma Screening: covered annually if you're considered high risk: (1) You have diabetes OR (2) Family history of glaucoma OR (3)  aged 50 and older OR (4)  American aged 65 and older  Osteoporosis Screening: covered every 2 years if you meet one of the following conditions: (1) You're estrogen deficient and at risk for osteoporosis based off medical history and other findings; (2) Have a vertebral abnormality; (3) On glucocorticoid therapy for more than 3 months; (4) Have primary hyperparathyroidism; (5) On osteoporosis medications and need to assess response to drug therapy.   Last bone density test (DXA Scan): 11/19/2024.  HIV Screening: covered annually if you're between the age of 15-65. Also covered annually if you are younger than 15 and older than 65 with risk factors for HIV infection.  For pregnant patients, it is covered up to 3 times per pregnancy.    Immunizations:  Immunization Recommendations   Influenza Vaccine Annual influenza vaccination during flu season is recommended for all persons aged >= 6 months who do not have contraindications   Pneumococcal Vaccine   * Pneumococcal conjugate vaccine = PCV13 (Prevnar 13), PCV15 (Vaxneuvance), PCV20 (Prevnar 20)  * Pneumococcal polysaccharide vaccine = PPSV23 (Pneumovax) Adults 19-63 yo with certain risk factors or if 65+ yo  If never received any pneumonia vaccine: recommend Prevnar 20 (PCV20)  Give PCV20 if previously received 1 dose of PCV13 or PPSV23   Hepatitis B Vaccine 3 dose series if at intermediate or high risk (ex: diabetes, end stage renal disease, liver disease)   Respiratory syncytial virus (RSV) Vaccine - COVERED BY MEDICARE PART D  * RSVPreF3 (Arexvy) CDC recommends that adults 60 years of age and older may receive a single dose of RSV vaccine using shared clinical decision-making (SCDM)   Tetanus (Td) Vaccine - COST NOT COVERED BY MEDICARE PART B Following completion of primary series, a booster dose should be given every 10 years to maintain immunity against tetanus. Td may also be given as tetanus wound prophylaxis.   Tdap Vaccine - COST NOT COVERED BY MEDICARE PART B Recommended at least once for all adults. For pregnant patients, recommended with each pregnancy.   Shingles Vaccine (Shingrix) - COST NOT COVERED BY MEDICARE PART B  2 shot series recommended in those 19 years and older who have or will have weakened immune systems or those 50 years and older     Health Maintenance Due:      Topic Date Due   • HIV Screening  01/29/2026 (Originally 5/5/1975)   • Hepatitis C Screening  01/05/2027 (Originally 1960)   • Cervical Cancer Screening  04/11/2025   • Breast Cancer Screening: Mammogram  01/13/2026   • Colorectal Cancer Screening  06/06/2027   • DXA SCAN  11/19/2027     Immunizations Due:      Topic Date Due   • IPV  Vaccine (2 of 3 - 4-dose series) 01/29/1966     Advance Directives   What are advance directives?  Advance directives are legal documents that state your wishes and plans for medical care. These plans are made ahead of time in case you lose your ability to make decisions for yourself. Advance directives can apply to any medical decision, such as the treatments you want, and if you want to donate organs.   What are the types of advance directives?  There are many types of advance directives, and each state has rules about how to use them. You may choose a combination of any of the following:  Living will:  This is a written record of the treatment you want. You can also choose which treatments you do not want, which to limit, and which to stop at a certain time. This includes surgery, medicine, IV fluid, and tube feedings.   Durable power of  for healthcare (DPAHC):  This is a written record that states who you want to make healthcare choices for you when you are unable to make them for yourself. This person, called a proxy, is usually a family member or a friend. You may choose more than 1 proxy.  Do not resuscitate (DNR) order:  A DNR order is used in case your heart stops beating or you stop breathing. It is a request not to have certain forms of treatment, such as CPR. A DNR order may be included in other types of advance directives.  Medical directive:  This covers the care that you want if you are in a coma, near death, or unable to make decisions for yourself. You can list the treatments you want for each condition. Treatment may include pain medicine, surgery, blood transfusions, dialysis, IV or tube feedings, and a ventilator (breathing machine).  Values history:  This document has questions about your views, beliefs, and how you feel and think about life. This information can help others choose the care that you would choose.  Why are advance directives important?  An advance directive helps you  control your care. Although spoken wishes may be used, it is better to have your wishes written down. Spoken wishes can be misunderstood, or not followed. Treatments may be given even if you do not want them. An advance directive may make it easier for your family to make difficult choices about your care.   Urinary Incontinence   Urinary incontinence (UI)  is when you lose control of your bladder. UI develops because your bladder cannot store or empty urine properly. The 3 most common types of UI are stress incontinence, urge incontinence, or both.  Medicines:   May be given to help strengthen your bladder control. Report any side effects of medication to your healthcare provider.  Do pelvic muscle exercises often:  Your pelvic muscles help you stop urinating. Squeeze these muscles tight for 5 seconds, then relax for 5 seconds. Gradually work up to squeezing for 10 seconds. Do 3 sets of 15 repetitions a day, or as directed. This will help strengthen your pelvic muscles and improve bladder control.  Train your bladder:  Go to the bathroom at set times, such as every 2 hours, even if you do not feel the urge to go. You can also try to hold your urine when you feel the urge to go. For example, hold your urine for 5 minutes when you feel the urge to go. As that becomes easier, hold your urine for 10 minutes.   Self-care:   Keep a UI record.  Write down how often you leak urine and how much you leak. Make a note of what you were doing when you leaked urine.  Drink liquids as directed. You may need to limit the amount of liquid you drink to help control your urine leakage. Do not drink any liquid right before you go to bed. Limit or do not have drinks that contain caffeine or alcohol.   Prevent constipation.  Eat a variety of high-fiber foods. Good examples are high-fiber cereals, beans, vegetables, and whole-grain breads. Walking is the best way to trigger your intestines to have a bowel movement.  Exercise regularly  and maintain a healthy weight.  Weight loss and exercise will decrease pressure on your bladder and help you control your leakage.   Use a catheter as directed  to help empty your bladder. A catheter is a tiny, plastic tube that is put into your bladder to drain your urine.   Go to behavior therapy as directed.  Behavior therapy may be used to help you learn to control your urge to urinate.    Weight Management   Why it is important to manage your weight:  Being overweight increases your risk of health conditions such as heart disease, high blood pressure, type 2 diabetes, and certain types of cancer. It can also increase your risk for osteoarthritis, sleep apnea, and other respiratory problems. Aim for a slow, steady weight loss. Even a small amount of weight loss can lower your risk of health problems.  How to lose weight safely:  A safe and healthy way to lose weight is to eat fewer calories and get regular exercise. You can lose up about 1 pound a week by decreasing the number of calories you eat by 500 calories each day.   Healthy meal plan for weight management:  A healthy meal plan includes a variety of foods, contains fewer calories, and helps you stay healthy. A healthy meal plan includes the following:  Eat whole-grain foods more often.  A healthy meal plan should contain fiber. Fiber is the part of grains, fruits, and vegetables that is not broken down by your body. Whole-grain foods are healthy and provide extra fiber in your diet. Some examples of whole-grain foods are whole-wheat breads and pastas, oatmeal, brown rice, and bulgur.  Eat a variety of vegetables every day.  Include dark, leafy greens such as spinach, kale, yesenia greens, and mustard greens. Eat yellow and orange vegetables such as carrots, sweet potatoes, and winter squash.   Eat a variety of fruits every day.  Choose fresh or canned fruit (canned in its own juice or light syrup) instead of juice. Fruit juice has very little or no  "fiber.  Eat low-fat dairy foods.  Drink fat-free (skim) milk or 1% milk. Eat fat-free yogurt and low-fat cottage cheese. Try low-fat cheeses such as mozzarella and other reduced-fat cheeses.  Choose meat and other protein foods that are low in fat.  Choose beans or other legumes such as split peas or lentils. Choose fish, skinless poultry (chicken or turkey), or lean cuts of red meat (beef or pork). Before you cook meat or poultry, cut off any visible fat.   Use less fat and oil.  Try baking foods instead of frying them. Add less fat, such as margarine, sour cream, regular salad dressing and mayonnaise to foods. Eat fewer high-fat foods. Some examples of high-fat foods include french fries, doughnuts, ice cream, and cakes.  Eat fewer sweets.  Limit foods and drinks that are high in sugar. This includes candy, cookies, regular soda, and sweetened drinks.  Exercise:  Exercise at least 30 minutes per day on most days of the week. Some examples of exercise include walking, biking, dancing, and swimming. You can also fit in more physical activity by taking the stairs instead of the elevator or parking farther away from stores. Ask your healthcare provider about the best exercise plan for you.   Alcohol Use and Your Health    Drinking too much can harm your health.  Excessive alcohol use leads to about 88,000 death in the [place] each year, and shortens the life of those who diet by almost 30 years.  Further, excessive drinking cost the economy $249 billion in 2010.  Most excessive drinkers are not alcohol dependent.    Excessive alcohol use has immediate effects that increase the risk of many harmful health conditions.  These are most often the result of binge drinking.  Over time, excessive alcohol use can lead to the development of chronic diseases and other series health problems.    What is considered a \"drink\"?        Excessive alcohol use includes:  Binge Drinking: For women, 4 or more drinks consumed on one " occasion. For men, 5 or more drinks consumed on one occasion.  Heavy Drinking: For women, 8 or more drinks per week. For men, 15 or more drinks per week  Any alcohol used by pregnant women  Any alcohol used by those under the age of 21 years    If you choose to drink, do so in moderation:  Do not drink at all if you are under the age of 21, or if you are or may be pregnant, or have health problems that could be made worse by drinking.  For women, up to 1 drink per day  For men, up to 2 drinks a day    No one should begin drinking or drink more frequently based on potential health benefits    Short-Term Health Risks:  Injuries: motor vehicle crashes, falls, drownings, burns  Violence: homicide, suicide, sexual assault, intimate partner violence  Alcohol poisoning  Reproductive health: risky sexual behaviors, unintended prengnacy, sexually transmitted diseases, miscarriage, stillbirth, fetal alcohol syndrome    Long-Term Health Risks:  Chronic diseases: high blood pressure, heart disease, stroke, liver disease, digestive problems  Cancers: breast, mouth and throat, liver, colon  Learning and memory problems: dementia, poor school performance  Mental health: depression, anxiety, insomnia  Social problems: lost productivity, family problems, unemployment  Alcohol dependence    For support and more information:  Substance Abuse and Mental Health Services Administration  [address]  [place] [address]  Web Address: http://www.samhsa.gov    Alcoholics Anonymous        Web Address: http://www.aa.org    https://www.cdc.gov/alcohol/fact-sheets/alcohol-use.htm     © Copyright [employer] 2018 Information is for End User's use only and may not be sold, redistributed or otherwise used for commercial purposes. All illustrations and images included in CareNotes® are the copyrighted property of [employer]., Inc. or [employer]

## 2025-02-03 NOTE — PROGRESS NOTES
ASSESSMENT/PLAN:    Primary adenocarcinoma of the left lung  Surgery April 2023  No evidence of disease  Follows with thoracic surgery    Hypercholesterolemia  Always very high, this year to 280 from 300 and   Coronary calcium score in 2023 was 13  We will do this every 5 years until formal recommendation is met  Patient will try to eat lower cholesterol foods    Osteoporosis  DEXA November 2024 was stable  Continue calcium vitamin D Actonel and resistive training with bands  Recheck November 2026     Anxiety  Doing well with CBD     Recheck in 1 year sooner if needed  Mammogram in December  DEXA in November 2026  Colonoscopy in May 2027            Depression Screening and Follow-up Plan: Patient was screened for depression during today's encounter. They screened negative with a PHQ-2 score of 0.    Urinary Incontinence Plan of Care: counseling topics discussed: practice Kegel (pelvic floor strengthening) exercises.            Health Maintenance   Topic Date Due    Medicare Annual Wellness Visit (AWV)  Never done    IPV Vaccine (2 of 3 - 4-dose series) 01/29/1966    Pneumococcal Vaccine: 65+ Years (1 of 1 - PCV) Never done    HIV Screening  01/29/2026 (Originally 5/5/1975)    Hepatitis C Screening  01/05/2027 (Originally 1960)    Cervical Cancer Screening  04/11/2025    Breast Cancer Screening: Mammogram  01/13/2026    Fall Risk  02/03/2026    Depression Screening  02/03/2026    Urinary Incontinence Screening  02/03/2026    Colorectal Cancer Screening  06/06/2027    DXA SCAN  11/19/2027    RSV Vaccine for Pregnant Patients and Patients Age 60+ Years (1 - 1-dose 75+ series) 05/05/2034    Osteoporosis Screening  Completed    Zoster Vaccine  Completed    Influenza Vaccine  Completed    COVID-19 Vaccine  Completed    Meningococcal B Vaccine  Aged Out    RSV Vaccine age 0-20 Months  Aged Out    HIB Vaccine  Aged Out    Hepatitis A Vaccine  Aged Out    Meningococcal ACWY Vaccine  Aged Out    HPV Vaccine  Aged  Out         Problem List as of 2/3/2025 Reviewed: 11/21/2024 10:17 AM by Brynn Naik MD      Age-related osteoporosis without current pathological fracture    Anxiety    BMI 27.0-27.9,adult    DVT (deep vein thrombosis) in pregnancy    Family history of pancreatic cancer    Hypercholesterolemia    Lung nodules    Last Assessment & Plan 11/21/2024 Office Visit Written 11/21/2024 10:14 AM by Brynn Naik MD   She has multiple lung nodules and of note, 2 new ones on her most recent CT scan.  These are micronodules and I am not particularly concerned about them.  We will continue to watch these at this time to ensure that they do not progress         Postmenopause atrophic vaginitis    Primary adenocarcinoma of lower lobe of left lung (HCC)    Last Assessment & Plan 11/21/2024 Office Visit Edited 11/21/2024 10:17 AM by Brynn Naik MD   Deyanira is a 64-year-old female who is well-known to me.  She underwent a left VATS lower lobectomy in April 2023.  She presents today for routine surveillance.  I have personally reviewed her most recent CT scan in PACS.  This demonstrates no signs of any recurrence of her cancer.  She does have 2 new micronodules on the left side.  These are sub-3 mm and are very small.  She has other nodules on the right side that are all stable.    Today in the office, we discussed her most recent CT scan.  She is stable with no signs of any recurrence of her cancer.  At this time, we will keep her on a 6-month surveillance scan and she will follow-up with me at that time with a CT scan of the chest.         Vitamin D deficiency         Subjective:   Chief Complaint   Patient presents with    Medicare Wellness Visit     Patient is here for yearly recheck also her initial welcome to Medicare questionnaire and for her full physical.    Since last visit she has had a colonoscopy and will be due in another 3 years because of a greater than 10 mm polyp, she followed up with  thoracic surgery for the lung adenoma and she also followed up with GYN  She also had blood work for us to review as well        patient ID: Deyanira Cox is a 64 y.o. female.    Patient's past medical history, surgical history, family history, social history, and Tobacco history reviewed with patient.     MED LIST WAS REVIEWED AND UPDATED    ROS  As per HPI  Rest of 12 point review of systems negative     Objective:      VITALS:  Wt Readings from Last 3 Encounters:   02/03/25 79.4 kg (175 lb 1.6 oz)   01/13/25 79.4 kg (175 lb)   12/05/24 78.6 kg (173 lb 4.8 oz)     BP Readings from Last 3 Encounters:   02/03/25 120/74   12/05/24 122/80   11/21/24 134/78     Pulse Readings from Last 3 Encounters:   02/03/25 58   12/05/24 70   11/21/24 56     Body mass index is 27.42 kg/m².    Laboratory Results:   All pertinent labs and studies were reviewed with patient during this office visit with highlights of the results contained in this note in the ASSESSMENT AND PLAN section       Physical Exam    Gen.  No acute distress well-appearing well-nourished appears stated age    Mental status  Good judgment and insight oriented to time person and place, recent and remote memory intact mood and affect normal cooperative and patient is reasonable    HEENT  PERRLA 3 mm, EOMI without nystagmus, normocephalic atraumatic without facial weakness    Neck   supple no masses trachea midline positive click normal carotid upstrokes with no bruits    Cor  Regular rhythm without ectopy or murmur, no S3-S4, normal palpation that is no heave lift or thrill    Vascular  No edema, good pedal pulses    Lungs  CTA bilaterally in no respiratory distress no wheezes rhonchi or rales, normal to palpation no tactile fremitus    Abdomen  Soft, no palpable masses, no hepatosplenomegaly, normal bowel sounds, nontender    Lymphatics  No palpable nodes in the neck, supraclavicular area, axilla, or groin    Musculoskeletal  No clubbing cyanosis or edema  muscle tone normal    Skin  no rashes or abnormal appearing lesions    Neuro  Normal ambulation, cranial nerves 2-12 grossly intact, higher functioning with reasoning intact.

## 2025-02-03 NOTE — ASSESSMENT & PLAN NOTE
Orders:    risedronate (ACTONEL) 35 mg tablet; Take 1 tablet (35 mg total) by mouth every 7 days with water on empty stomach, nothing by mouth or lie down for next 30 minutes.

## 2025-02-03 NOTE — PROGRESS NOTES
Name: Deyanira Cox      : 1960      MRN: [id number]  Encounter Provider: Michelle Burgess DO  Encounter Date: 2/3/2025   Encounter department: [facility]    Assessment & Plan  Encounter for immunization    Orders:    Pneumococcal Conjugate Vaccine 20-valent (Pcv20)    Primary adenocarcinoma of lower lobe of left lung (HCC)         Age-related osteoporosis without current pathological fracture    Orders:    risedronate (ACTONEL) 35 mg tablet; Take 1 tablet (35 mg total) by mouth every 7 days with water on empty stomach, nothing by mouth or lie down for next 30 minutes.    Anxiety         Lung nodules         Vitamin D deficiency         Welcome to Medicare preventive visit         Encounter for abdominal aortic aneurysm screening    Orders:     Abdominal Aorta Medicare Screening AAA; Future    Encounter for screening mammogram for breast cancer    Orders:    Mammo screening bilateral w 3d and cad; Future    Medicare annual wellness visit, initial            Preventive health issues were discussed with patient, and age appropriate screening tests were ordered as noted in patient's After Visit Summary. Personalized health advice and appropriate referrals for health education or preventive services given if needed, as noted in patient's After Visit Summary.    History of Present Illness     HPI   Patient Care Team:  Michelle Burgess DO as PCP - General  Michelle Burgess DO as PCP - PCP-[facility]  Brynn Naik MD (Thoracic Surgery)    Review of Systems  Medical History Reviewed by provider this encounter:  Tobacco  Allergies  Meds  Problems  Med Hx  Surg Hx  Fam Hx       Annual Wellness Visit Questionnaire   Deyanira is here for her Welcome to Medicare visit.     Health Risk Assessment:   Patient rates overall health as very good. Patient feels that their physical health rating is much better. Patient is very satisfied with their  life. Eyesight was rated as same. Hearing was rated as same. Patient feels that their emotional and mental health rating is same. Patients states they are never, rarely angry. Patient states they are never, rarely unusually tired/fatigued. Pain experienced in the last 7 days has been none. Patient states that she has experienced no weight loss or gain in last 6 months.     Depression Screening:   PHQ-2 Score: 0      Fall Risk Screening:   In the past year, patient has experienced: no history of falling in past year      Urinary Incontinence Screening:   Patient has leaked urine accidently in the last six months. Few times after sneezing    Home Safety:  Patient does not have trouble with stairs inside or outside of their home. Patient has working smoke alarms and has working carbon monoxide detector. Home safety hazards include: none.     Nutrition:   Current diet is Regular.     Medications:   Patient is currently taking over-the-counter supplements. OTC medications include: see medication list. Patient is able to manage medications.     Activities of Daily Living (ADLs)/Instrumental Activities of Daily Living (IADLs):   Walk and transfer into and out of bed and chair?: Yes  Dress and groom yourself?: Yes    Bathe or shower yourself?: Yes    Feed yourself? Yes  Do your laundry/housekeeping?: Yes  Manage your money, pay your bills and track your expenses?: Yes  Make your own meals?: Yes    Do your own shopping?: Yes    Durable Medical Equipment Suppliers  N/A    Previous Hospitalizations:   Any hospitalizations or ED visits within the last 12 months?: No      Advance Care Planning:   Living will: Yes    Durable POA for healthcare: No    Advanced directive: Yes    Advanced directive counseling given: Yes    End of Life Decisions reviewed with patient: Yes      Cognitive Screening:   Provider or family/friend/caregiver concerned regarding cognition?: No    PREVENTIVE SCREENINGS      Cardiovascular Screening:     General: Screening Not Indicated and History Lipid Disorder      Diabetes Screening:     General: Screening Current      Colorectal Cancer Screening:     General: Screening Current      Breast Cancer Screening:     General: Screening Current      Cervical Cancer Screening:    General: Screening Not Indicated and Screening Current      Osteoporosis Screening:    General: Screening Not Indicated and History Osteoporosis      Abdominal Aortic Aneurysm (AAA) Screening:      Due for: Screening AAA Ultrasound      Lung Cancer Screening:     General: Screening Not Indicated and History Lung Cancer      Hepatitis C Screening:    General: Screening Current    Screening, Brief Intervention, and Referral to Treatment (SBIRT)    Screening  Typical number of drinks in a day: 0  Typical number of drinks in a week: 3  Interpretation: Low risk drinking behavior.    AUDIT-C Screenin) How often did you have a drink containing alcohol in the past year? 2 to 3 times a week  2) How many drinks did you have on a typical day when you were drinking in the past year? 1 to 2  3) How often did you have 6 or more drinks on one occasion in the past year? never    AUDIT-C Score: 3  Interpretation: Score 3-12 (female): POSITIVE screen for alcohol misuse    AUDIT Screenin) How often during the last year have you found that you were not able to stop drinking once you had started? 0 - never  5) How often during the last year have you failed to do what was normally expected from you because of drinking? 0 - never  6) How often during the last year have you needed a first drink in the morning to get yourself going after a heavy drinking session? 0 - never  7) How often during the last year have you had a feeling of guilt or remorse after drinking? 0 - never  8) How often during the last year have you been unable to remember what happened the night before because you had been drinking? 0 - never  9) Have you or someone else been injured as a  "result of your drinking? 0 - no  10) Has a relative or friend or a doctor or another health worker been concerned about your drinking or suggested you cut down? 0 - no    AUDIT Score: 3  Interpretation: Low risk alcohol consumption    Single Item Drug Screening:  How often have you used an illegal drug (including marijuana) or a prescription medication for non-medical reasons in the past year? never    Single Item Drug Screen Score: 0  Interpretation: Negative screen for possible drug use disorder    Brief Intervention  Alcohol & drug use screenings were reviewed. No concerns regarding substance use disorder identified.     Other Counseling Topics:   Regular weightbearing exercise and calcium and vitamin D intake.     Social Drivers of Health     Food Insecurity: No Food Insecurity (1/29/2025)    Hunger Vital Sign     Worried About Running Out of Food in the Last Year: Never true     Ran Out of Food in the Last Year: Never true   Transportation Needs: No Transportation Needs (1/29/2025)    PRAPARE - Transportation     Lack of Transportation (Medical): No     Lack of Transportation (Non-Medical): No   Housing Stability: Low Risk  (1/29/2025)    Housing Stability Vital Sign     Unable to Pay for Housing in the Last Year: No     Number of Times Moved in the Last Year: 0     Homeless in the Last Year: No   Utilities: Not At Risk (1/29/2025)    [facility] Utilities     Threatened with loss of utilities: No     No results found.    Objective   /74   Pulse 58   Temp 98.2 °F (36.8 °C)   Resp 16   Ht 5' 7\" (1.702 m)   Wt 79.4 kg (175 lb 1.6 oz)   LMP  (LMP Unknown)   SpO2 98%   BMI 27.42 kg/m²     Physical Exam    "

## 2025-02-11 ENCOUNTER — HOSPITAL ENCOUNTER (OUTPATIENT)
Dept: RADIOLOGY | Facility: HOSPITAL | Age: 65
Discharge: HOME | End: 2025-02-11
Payer: MEDICARE

## 2025-02-11 DIAGNOSIS — Z13.6 ENCOUNTER FOR ABDOMINAL AORTIC ANEURYSM SCREENING: ICD-10-CM

## 2025-02-11 PROCEDURE — 76706 US ABDL AORTA SCREEN AAA: CPT

## 2025-02-17 ENCOUNTER — RESULTS FOLLOW-UP (OUTPATIENT)
Dept: FAMILY MEDICINE CLINIC | Facility: CLINIC | Age: 65
End: 2025-02-17

## 2025-02-24 DIAGNOSIS — Z00.6 ENCOUNTER FOR EXAMINATION FOR NORMAL COMPARISON OR CONTROL IN CLINICAL RESEARCH PROGRAM: ICD-10-CM

## 2025-02-27 ENCOUNTER — APPOINTMENT (OUTPATIENT)
Dept: LAB | Facility: CLINIC | Age: 65
End: 2025-02-27

## 2025-02-27 DIAGNOSIS — Z00.6 ENCOUNTER FOR EXAMINATION FOR NORMAL COMPARISON OR CONTROL IN CLINICAL RESEARCH PROGRAM: ICD-10-CM

## 2025-02-27 PROCEDURE — 36415 COLL VENOUS BLD VENIPUNCTURE: CPT

## 2025-03-11 LAB
APOB+LDLR+PCSK9 GENE MUT ANL BLD/T: NOT DETECTED
BRCA1+BRCA2 DEL+DUP + FULL MUT ANL BLD/T: NOT DETECTED
MLH1+MSH2+MSH6+PMS2 GN DEL+DUP+FUL M: NOT DETECTED

## 2025-04-22 ENCOUNTER — ANNUAL EXAM (OUTPATIENT)
Dept: OBGYN CLINIC | Facility: CLINIC | Age: 65
End: 2025-04-22
Payer: MEDICARE

## 2025-04-22 VITALS
SYSTOLIC BLOOD PRESSURE: 102 MMHG | DIASTOLIC BLOOD PRESSURE: 60 MMHG | WEIGHT: 175 LBS | HEIGHT: 67 IN | BODY MASS INDEX: 27.47 KG/M2

## 2025-04-22 DIAGNOSIS — Z01.419 WELL WOMAN EXAM WITH ROUTINE GYNECOLOGICAL EXAM: Primary | ICD-10-CM

## 2025-04-22 DIAGNOSIS — K64.4 EXTERNAL HEMORRHOID: ICD-10-CM

## 2025-04-22 PROCEDURE — G0101 CA SCREEN;PELVIC/BREAST EXAM: HCPCS | Performed by: PHYSICIAN ASSISTANT

## 2025-04-22 RX ORDER — HYDROCORTISONE 25 MG/G
CREAM TOPICAL 2 TIMES DAILY
Qty: 28 G | Refills: 0 | Status: SHIPPED | OUTPATIENT
Start: 2025-04-22

## 2025-04-22 NOTE — PROGRESS NOTES
Name: Deyanira Cox      : 1960      MRN: [id number]  Encounter Provider: Tomeka Lopez PA-C  Encounter Date: 2025   Encounter department: [facility]  :  Assessment & Plan  Well woman exam with routine gynecological exam  Pap no longer indicated   Mammo slip given   Colonoscopy up to date    Perineal hygiene reviewed. Weight bearing exercises minium of 150 mins/weekly advised. Kegel exercises recommended.   SBE encouraged, A yearly mammogram is recommended for breast cancer screening starting at age 40. ASCCP guidelines reviewed. Condoms encouraged with all sexual activity to prevent STI's. Gardisil vaccines recommended up to age 45. Calcium/ Vit D dietary requirements discussed.   Advised to call with any issues, all concerns & questions addressed.   See provided information in your after visit summary     F/U Annually and PRN    Results will be released to [employer], if abnormal will call or message to review and discuss treatment plan.        External hemorrhoid  - External hemorrhoid noted on exam today with inflammation.  Advised trial of Anusol cream.  Rx sent to pharmacy.  -Consider colorectal referral if symptoms fail to improve  Orders:    hydrocortisone (ANUSOL-HC) 2.5 % rectal cream; Apply topically 2 (two) times a day      History of Present Illness   HPI    Subjective     Deyanira Cox is a 64 y.o.  presenting for annual exam.     Her only concern today is external hemorrhoids which have been bothering her more recently over the past several weeks.  Had symptoms of rectal bleeding a few weeks ago with inflamed hemorrhoid.  Last week had itching and irritation at night.  Patient googled her symptoms and became concerned about pinworms.  She has not had any recurrence of itching and irritation since last week and hemorrhoid seems to be resolving.  Her  does not have any symptoms of rectal itching or irritation.  Reviewed  pinworm diagnosis is unlikely given symptoms are resolving and known recent hemorrhoid.    She has a hx of primary adenocarcinoma of the lower lobe of left lung. She underwent a VATS left lower lobectomy on 2023. Followed by cardiothoracic surgery - she is on a 6 month surveillance schedule.      She has a history of a DVT and is not an estrogen candidate.    SCREENING  Last Pap: 2023 neg/neg  Last Mammo: 2025 birads 1  Last Colonoscopy: 2024 3 year recall  Last DEXA: 2024 osteoporosis; taking actonel      GYN  , no VB    Sexually active: Yes - single partner - male    Hx Abnormal pap: denies  We reviewed ASCCP guidelines for Pap testing today.    Denies vaginal discharge, itching, odor, dyspareunia, pelvic pain and vulvar/vaginal symptoms      OB           Complaints: denies   Denies urgency, frequency, hematuria, leakage / change in stream, difficulty urinating.       BREAST  Complaints: denies   Denies: breast lump, breast tenderness, nipple discharge, skin color change, and skin lesion(s)  Personal hx: right breast biopsy in        Pertinent Family Hx:   Family hx of breast cancer: mother, paternal grandmother, maternal aunt (age and gene status unknown)     Pt did have gene testing and all testing was negative    Family hx of ovarian cancer: no  Family hx of colon cancer: no      GENERAL  PMH reviewed/updated and is as below.   Patient does follow with a PCP.    SOCIAL  Smoking: no  Alcohol:occasional   Drug: no      Past Medical History:   Diagnosis Date    Actinic keratosis     last assessed - 2017    Allergic     Mixed trees, dust, mold. Not bad    Anemia     BRCA1 negative     neg results    BRCA2 negative     Breast lump     last assessed - 2017    Cancer (HCC) 23    In lower left lobe of Lung    Chest tightness or pressure     Resolved - 2017    Colon polyp     Difficulty walking     DVT (deep vein thrombosis) in pregnancy     Ground  glass opacity present on imaging of lung     Shoulder pain, left     last assessed - [id number]       Past Surgical History:   Procedure Laterality Date    BREAST BIOPSY Right     neg results     SECTION      x 2    COLONOSCOPY      Complete Colonoscopy    LUNG LOBECTOMY Left 2023    Procedure: completion lower lobectomy;  Surgeon: Brynn Naik MD;  Location: [facility];  Service: Thoracic    LUNG SEGMENTECTOMY Left 2023    Procedure: left thoracoscopic lower lobe wedge resection;  Surgeon: Brynn Naik MD;  Location: [facility];  Service: Thoracic    AR BRNCHSC INCL FLUOR GDNCE DX W/CELL WASHG SPX N/A 2023    Procedure: BRONCHOSCOPY FLEXIBLE;  Surgeon: Brynn Naik MD;  Location: [facility];  Service: Thoracic    AR THORACOSCOPY W/THERA WEDGE RESEXN INITIAL UNILAT Left 2023    Procedure: THORACOSCOPY VIDEO ASSISTED SURGERY (VATS);  Surgeon: Brynn Naik MD;  Location: [facility];  Service: Thoracic    TONSILLECTOMY           Current Outpatient Medications:     Ascorbic Acid (Vitamin C) 500 MG CAPS, Take by mouth daily, Disp: , Rfl:     aspirin 81 mg chewable tablet, Chew 1 tablet daily, Disp: , Rfl:     Calcium-Magnesium (RODRICK/MAG CITRATE) 250-125 MG TABS, 3 daily, Disp: , Rfl:     Glucosamine-Chondroitin 250-200 MG TABS, Take 2 tablets by mouth daily, Disp: , Rfl:     hydrocortisone (ANUSOL-HC) 2.5 % rectal cream, Apply topically 2 (two) times a day, Disp: 28 g, Rfl: 0    Iron-Vitamin C 100-250 MG TABS, Take 1 tablet by mouth once a week, Disp: , Rfl:     Multiple Vitamins-Minerals (WOMENS ONE DAILY) TABS, Take 1 tablet by mouth daily, Disp: , Rfl:     Omega-3 Fatty Acids (OMEGA-3 FISH OIL) 1000 MG CAPS, Take 1 capsule by mouth daily, Disp: , Rfl:     polyethylene glycol (GLYCOLAX) 17 GM/SCOOP powder, Take 17 g by mouth daily as needed, Disp: , Rfl:     Probiotic Product (PROBIOTIC-10 PO), Take by mouth 2 (two) times a day , Disp: , Rfl:     risedronate  (ACTONEL) 35 mg tablet, Take 1 tablet (35 mg total) by mouth every 7 days with water on empty stomach, nothing by mouth or lie down for next 30 minutes., Disp: 12 tablet, Rfl: 3    zinc gluconate 50 mg tablet, Take 50 mg by mouth daily, Disp: , Rfl:     Allergies   Allergen Reactions    Other Allergic Rhinitis     Seasonal        Social History     Socioeconomic History    Marital status: /Civil Union     Spouse name: Not on file    Number of children: Not on file    Years of education: Not on file    Highest education level: Not on file   Occupational History    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   Tobacco Use    Smoking status: Never    Smokeless tobacco: Never   Vaping Use    Vaping status: Never Used   Substance and Sexual Activity    Alcohol use: Yes     Alcohol/week: 1.0 standard drink of alcohol    Drug use: No    Sexual activity: Yes     Partners: Male     Birth control/protection: None   Other Topics Concern    Not on file   Social History Narrative    Activities:  Treadmill    Always uses seat belt    Caffeine use - 2 cups coffee per day    Exercise: Running    Exercise: Walking    Exercises moderately less than 3 times a week    Has smoke detectors    Weight training     Social Drivers of Health     Financial Resource Strain: Not on file   Food Insecurity: No Food Insecurity (1/29/2025)    Hunger Vital Sign     Worried About Running Out of Food in the Last Year: Never true     Ran Out of Food in the Last Year: Never true   Transportation Needs: No Transportation Needs (1/29/2025)    PRAPARE - Transportation     Lack of Transportation (Medical): No     Lack of Transportation (Non-Medical): No   Physical Activity: Not on file   Stress: Not on file   Social Connections: Not on file   Intimate Partner Violence: Not on file   Housing Stability: Low Risk  (1/29/2025)    Housing Stability Vital Sign     Unable to Pay for Housing in the Last Year: No     Number of Times Moved in the Last Year: 0     Homeless  "in the Last Year: No         Review of Systems   Constitutional: Negative.    Respiratory: Negative.     Cardiovascular: Negative.    Gastrointestinal: Negative.         Rectal itching     Genitourinary: Negative.    Musculoskeletal: Negative.    Skin: Negative.    Psychiatric/Behavioral: Negative.            Objective   /60 (BP Location: Left arm, Patient Position: Sitting, Cuff Size: Standard)   Ht 5' 7\" (1.702 m)   Wt 79.4 kg (175 lb)   LMP  (LMP Unknown)   BMI 27.41 kg/m²      Physical Exam  Constitutional:       Appearance: Normal appearance.   Genitourinary:      Urethral meatus normal.      No lesions in the vagina.      Right Labia: No rash, tenderness, lesions or skin changes.     Left Labia: No tenderness, lesions, skin changes or rash.           No inguinal adenopathy present in the right or left side.     No vaginal discharge, tenderness or bleeding.      No vaginal prolapse present.       Right Adnexa: not tender, not full and no mass present.     Left Adnexa: not tender and not full.     No cervical motion tenderness, friability, lesion, polyp or eversion.      Uterus is not enlarged or tender.      No uterine mass detected.  Rectum:      External hemorrhoid present.   Breasts:     Breasts are symmetrical.      Right: No mass, nipple discharge, skin change or tenderness.      Left: No mass, nipple discharge, skin change or tenderness.   HENT:      Head: Normocephalic and atraumatic.   Neck:      Thyroid: No thyroid mass or thyromegaly.   Pulmonary:      Effort: Pulmonary effort is normal.   Abdominal:      General: Abdomen is flat.      Hernia: There is no hernia in the left inguinal area or right inguinal area.   Musculoskeletal:      Cervical back: Neck supple.      Right lower leg: No edema.      Left lower leg: No edema.   Lymphadenopathy:      Cervical: No cervical adenopathy.      Upper Body:      Right upper body: No supraclavicular or axillary adenopathy.      Left upper body: No " supraclavicular or axillary adenopathy.      Lower Body: No right inguinal adenopathy. No left inguinal adenopathy.   Neurological:      General: No focal deficit present.      Mental Status: She is alert. Mental status is at baseline.   Skin:     General: Skin is warm and dry.   Psychiatric:         Mood and Affect: Mood normal.         Behavior: Behavior normal.         Thought Content: Thought content normal.         Judgment: Judgment normal.   Vitals reviewed.

## 2025-05-15 ENCOUNTER — TELEPHONE (OUTPATIENT)
Age: 65
End: 2025-05-15

## 2025-05-15 ENCOUNTER — TELEPHONE (OUTPATIENT)
Dept: CARDIAC SURGERY | Facility: CLINIC | Age: 65
End: 2025-05-15

## 2025-05-15 NOTE — TELEPHONE ENCOUNTER
Called pt, left voicemail.  Offered the patient to move her appt to same day 5/29/25 at 3:40pm.  Patient was instructed to call the office only if this time works better with her schedule.

## 2025-05-15 NOTE — TELEPHONE ENCOUNTER
Patient returning call.    Would like to take 3:40pm appmt on 5/29 Dr Naik    Please call patient [phone]

## 2025-05-22 ENCOUNTER — HOSPITAL ENCOUNTER (OUTPATIENT)
Dept: RADIOLOGY | Facility: HOSPITAL | Age: 65
Discharge: HOME | End: 2025-05-22
Attending: THORACIC SURGERY (CARDIOTHORACIC VASCULAR SURGERY)
Payer: MEDICARE

## 2025-05-22 DIAGNOSIS — C34.32 PRIMARY ADENOCARCINOMA OF LOWER LOBE OF LEFT LUNG (HCC): ICD-10-CM

## 2025-05-22 PROCEDURE — 71250 CT THORAX DX C-: CPT

## 2025-05-29 ENCOUNTER — OFFICE VISIT (OUTPATIENT)
Dept: CARDIAC SURGERY | Facility: CLINIC | Age: 65
End: 2025-05-29
Payer: MEDICARE

## 2025-05-29 VITALS
RESPIRATION RATE: 16 BRPM | HEART RATE: 80 BPM | DIASTOLIC BLOOD PRESSURE: 72 MMHG | SYSTOLIC BLOOD PRESSURE: 118 MMHG | TEMPERATURE: 98 F | HEIGHT: 67 IN | OXYGEN SATURATION: 97 % | WEIGHT: 174.38 LBS | BODY MASS INDEX: 27.37 KG/M2

## 2025-05-29 DIAGNOSIS — C34.32 PRIMARY ADENOCARCINOMA OF LOWER LOBE OF LEFT LUNG (HCC): Primary | ICD-10-CM

## 2025-05-29 PROCEDURE — 99213 OFFICE O/P EST LOW 20 MIN: CPT | Performed by: THORACIC SURGERY (CARDIOTHORACIC VASCULAR SURGERY)

## 2025-05-29 PROCEDURE — G2211 COMPLEX E/M VISIT ADD ON: HCPCS | Performed by: THORACIC SURGERY (CARDIOTHORACIC VASCULAR SURGERY)

## 2025-05-29 NOTE — ASSESSMENT & PLAN NOTE
She is now 2 years out from her surgery.  At this time, she will be maintained on a every 6 month CT scan of the chest to schedule.  This is within the NCCN guidelines.  I have ordered a CT scan of the chest for 6 months or now she will follow-up with me at that time  Orders:    CT chest wo contrast; Future

## (undated) DEVICE — VESSEL LOOPS X-RAY DETECTABLE: Brand: DEROYAL

## (undated) DEVICE — SUT MONOCRYL PLUS 4-0 PS-2 18 IN MCP496G

## (undated) DEVICE — GLOVE SRG BIOGEL ECLIPSE 6

## (undated) DEVICE — INTENDED FOR TISSUE SEPARATION, AND OTHER PROCEDURES THAT REQUIRE A SHARP SURGICAL BLADE TO PUNCTURE OR CUT.: Brand: BARD-PARKER SAFETY BLADES SIZE 15, STERILE

## (undated) DEVICE — ELECTRODE LAP L WIRE E-Z CLEAN 33CM -0100

## (undated) DEVICE — STERILE THORACIC PACK: Brand: CARDINAL HEALTH

## (undated) DEVICE — TUBING SUCTION 5MM X 12 FT

## (undated) DEVICE — ANTIBACTERIAL UNDYED BRAIDED (POLYGLACTIN 910), SYNTHETIC ABSORBABLE SUTURE: Brand: COATED VICRYL

## (undated) DEVICE — ELECTRODE BLADE MOD E-Z CLEAN 2.5IN 6.4CM -0012M

## (undated) DEVICE — WOUND RETRACTOR AND PROTECTOR: Brand: ALEXIS WOUND PROTECTOR-RETRACTOR

## (undated) DEVICE — ENDOPATH ECHELON VASCULAR  RELOADS, WHITE, 35MM: Brand: ECHELON ENDOPATH

## (undated) DEVICE — NEEDLE SPINAL 20G X 3.5 LF

## (undated) DEVICE — ADHESIVE SKIN HIGH VISCOSITY EXOFIN 1ML

## (undated) DEVICE — ECHELON FLEX  POWERED VASCULAR STAPLER WITH ADVANCED PLACEMENT TIP, 35MM: Brand: ECHELON FLEX

## (undated) DEVICE — SINGLE USE SUCTION VALVE MAJ-209: Brand: SINGLE USE SUCTION VALVE (STERILE)

## (undated) DEVICE — GLOVE INDICATOR PI UNDERGLOVE SZ 6.5 BLUE

## (undated) DEVICE — SINGLE USE BIOPSY VALVE MAJ-210: Brand: SINGLE USE BIOPSY VALVE (STERILE)

## (undated) DEVICE — SUT VICRYL 3-0 SH 27 IN J416H

## (undated) DEVICE — HEMOSTATIC MATRIX SURGIFLO 8ML W/THROMBIN

## (undated) DEVICE — THE ECHELON, ECHELON ENDOPATH™ AND ECHELON FLEX™ FAMILIES OF ENDOSCOPIC LINEAR CUTTERS AND RELOADS ARE STERILE, SINGLE PATIENT USE INSTRUMENTS THAT SIMULTANEOUSLY CUT AND STAPLE TISSUE. THERE ARE SIX STAGGERED ROWS OF STAPLES, THREE ON EITHER SIDE OF THE CUT LINE. THE 45 MM INSTRUMENTS HAVE A STAPLE LINE THATIS APPROXIMATELY 45 MM LONG AND A CUT LINE THAT IS APPROXIMATELY 42 MM LONG. THE SHAFT CAN ROTATE FREELY IN BOTH DIRECTIONS AND AN ARTICULATION MECHANISM ON ARTICULATING INSTRUMENTS ENABLES BENDING THE DISTAL PORTIONOF THE SHAFT TO FACILITATE LATERAL ACCESS OF THE OPERATIVE SITE.THE INSTRUMENTS ARE SHIPPED WITHOUT A RELOAD AND MUST BE LOADED PRIOR TO USE. A STAPLE RETAINING CAP ON THE RELOAD PROTECTS THE STAPLE LEG POINTS DURING SHIPPING AND TRANSPORTATION. THE INSTRUMENTS’ LOCK-OUT FEATURE IS DESIGNED TO PREVENT A USED RELOAD FROM BEING REFIRED.: Brand: ECHELON ENDOPATH

## (undated) DEVICE — SUT VICRYL PLUS 0 UR-6 27IN VCP603H

## (undated) DEVICE — LIGACLIP 10-M/L, 10MM ENDOSCOPIC ROTATING MULTIPLE CLIP APPLIERS: Brand: LIGACLIP

## (undated) DEVICE — SUT PROLENE 0 CT-1 30 IN 8424H

## (undated) DEVICE — THE ECHELON FLEX POWERED PLUS ARTICULATING ENDOSCOPIC LINEAR CUTTERS ARE STERILE, SINGLE PATIENT USE INSTRUMENTS THAT SIMULTANEOUSLYCUT AND STAPLE TISSUE. THERE ARE SIX STAGGERED ROWS OF STAPLES, THREE ON EITHER SIDE OF THE CUT LINE. THE ECHELON FLEX 45 POWERED PLUSINSTRUMENTS HAVE A STAPLE LINE THAT IS APPROXIMATELY 45 MM LONG AND A CUT LINE THAT IS APPROXIMATELY 42 MM LONG. THE SHAFT CAN ROTATE FREELYIN BOTH DIRECTIONS AND AN ARTICULATION MECHANISM ENABLES THE DISTAL PORTION OF THE SHAFT TO PIVOT TO FACILITATE LATERAL ACCESS TO THE OPERATIVESITE.THE INSTRUMENTS ARE PACKAGED WITH A PRIMARY LITHIUM BATTERY PACK THAT MUST BE INSTALLED PRIOR TO USE. THERE ARE SPECIFIC REQUIREMENTS FORDISPOSING OF THE BATTERY PACK. REFER TO THE BATTERY PACK DISPOSAL SECTION.THE INSTRUMENTS ARE PACKAGED WITHOUT A RELOAD AND MUST BE LOADED PRIOR TO USE. A STAPLE RETAINING CAP ON THE RELOAD PROTECTS THE STAPLE LEGPOINTS DURING SHIPPING AND TRANSPORTATION. THE INSTRUMENTS’ LOCK-OUT FEATURE IS DESIGNED TO PREVENT A USED OR IMPROPERLY INSTALLED RELOADFROM BEING REFIRED OR AN INSTRUMENT FROM BEING FIRED WITHOUT A RELOAD.: Brand: ECHELON FLEX

## (undated) DEVICE — GAUZE SPONGES,16 PLY: Brand: CURITY

## (undated) DEVICE — SURGICEL 4 X 8

## (undated) DEVICE — FIRST STEP BEDSIDE KIT - STAND-UP POUCH, ENDOSCOPIC CLEANING PAD - 1 POUCH: Brand: FIRST STEP BEDSIDE KIT - STAND-UP POUCH, ENDOSCOPIC CLEANING PAD

## (undated) DEVICE — INTENDED FOR TISSUE SEPARATION, AND OTHER PROCEDURES THAT REQUIRE A SHARP SURGICAL BLADE TO PUNCTURE OR CUT.: Brand: BARD-PARKER SAFETY BLADES SIZE 10, STERILE

## (undated) DEVICE — UTILITY MARKER,BLACK WITH LABELS: Brand: DEVON